# Patient Record
Sex: FEMALE | Race: WHITE | Employment: OTHER | URBAN - METROPOLITAN AREA
[De-identification: names, ages, dates, MRNs, and addresses within clinical notes are randomized per-mention and may not be internally consistent; named-entity substitution may affect disease eponyms.]

---

## 2017-01-13 PROBLEM — E66.01 MORBID OBESITY WITH BMI OF 45.0-49.9, ADULT (HCC): Status: ACTIVE | Noted: 2017-01-13

## 2017-01-13 PROCEDURE — 88305 TISSUE EXAM BY PATHOLOGIST: CPT | Performed by: OBSTETRICS & GYNECOLOGY

## 2017-02-24 ENCOUNTER — HOSPITAL ENCOUNTER (OUTPATIENT)
Dept: RADIATION ONCOLOGY | Age: 62
Discharge: HOME | End: 2017-02-24
Attending: RADIOLOGY
Payer: COMMERCIAL

## 2017-02-24 VITALS
OXYGEN SATURATION: 97 % | SYSTOLIC BLOOD PRESSURE: 135 MMHG | DIASTOLIC BLOOD PRESSURE: 80 MMHG | RESPIRATION RATE: 20 BRPM | TEMPERATURE: 100 F | HEIGHT: 64 IN | BODY MASS INDEX: 45.73 KG/M2 | WEIGHT: 267.88 LBS | HEART RATE: 94 BPM

## 2017-02-24 DIAGNOSIS — C54.8 MALIGNANT NEOPLASM OF OVERLAPPING SITES OF BODY OF UTERUS (HCC): Primary | ICD-10-CM

## 2017-02-24 PROCEDURE — 99214 OFFICE O/P EST MOD 30 MIN: CPT

## 2017-02-24 RX ORDER — PREDNISOLONE ACETATE 10 MG/ML
SUSPENSION/ DROPS OPHTHALMIC
Refills: 1 | COMMUNITY
Start: 2017-01-17 | End: 2018-10-25 | Stop reason: ALTCHOICE

## 2017-02-24 RX ORDER — CALCIUM CARBONATE 200(500)MG
3 TABLET,CHEWABLE ORAL DAILY
COMMUNITY
End: 2021-02-08

## 2017-02-24 RX ORDER — FLUTICASONE PROPIONATE 50 MCG
2 SPRAY, SUSPENSION (ML) NASAL AS NEEDED
Refills: 2 | COMMUNITY
Start: 2017-01-05

## 2017-02-24 RX ORDER — ACETAMINOPHEN 500 MG
500 TABLET ORAL EVERY 6 HOURS PRN
COMMUNITY
End: 2018-08-27

## 2017-02-24 RX ORDER — CHOLECALCIFEROL (VITAMIN D3) 1250 MCG
5000 CAPSULE ORAL DAILY
COMMUNITY

## 2017-02-24 RX ORDER — HOMATROPINE HYDROBROMIDE OPHTHALMIC 50 MG/ML
SOLUTION OPHTHALMIC
Refills: 1 | COMMUNITY
Start: 2017-01-17 | End: 2017-08-28

## 2017-02-24 NOTE — PROGRESS NOTES
Nursing Consultation Note  Patient: Meka Lorenzo  YOB: 1955  Age: 59year old  Radiation Oncologist: Dr. Irene Wagner  Referring Physician: Dr. Erinn Mcgrath Si (PCP)  Diagnosis: UTERINE CANCER  Consult Date: obstruction     Allergies:  Seasonal                     Chemotherapy: N/A    Previous Radiation: N/A     Social History   Marital Status:   Spouse Name: N/A    Years of Education: N/A  Number of Children: 4     Occupational History  RETIRED [employer] Respiratory: Negative. Cardiovascular: Negative. Gastrointestinal: Negative. Genitourinary: Positive for urgency. Musculoskeletal: Negative. Skin: Negative. Neurological: Negative. Endo/Heme/Allergies: Negative.     Psychiatric/Behavio

## 2017-02-24 NOTE — CONSULTS
[facility]    PATIENT'S NAME: Izzy Lore   RADIATION ONCOLOGIST: Johnny Alves M.D.    PATIENT ACCOUNT #: [de-identified] [facility]   MEDICAL RECORD #: [id number] YOB: 1955   CONSULTATION DATE: 02/24/2017       RICHIE by Dr. Regino Espinal postoperatively on 02/20/2017. Her staples have been removed, and he states that the incision sites are healing well. She is now referred for consideration of postoperative radiation therapy options.   She states that no chemotherapy was rec pressure 135/80, pulse 94, temperature 99.6, respiratory rate 20, weight 267, and O2 level is 97% on room air. HEENT:  Normocephalic, atraumatic. EOMI. ROSALEE. Oral cavity and oropharynx without exudates or lesions.   NECK:  Without supraclavicular or cer irradiation. After a lengthy discussion, it was elected that the patient would benefit from vaginal brachytherapy alone to minimize her side effects and maximize her cure rate.   I have recommended that she receive 30 Gy to the vaginal mucosa in 5 frac

## 2017-03-01 ENCOUNTER — HOSPITAL ENCOUNTER (OUTPATIENT)
Dept: RADIATION ONCOLOGY | Facility: HOSPITAL | Age: 62
Discharge: HOME | End: 2017-03-01
Attending: RADIOLOGY
Payer: COMMERCIAL

## 2017-03-02 ENCOUNTER — APPOINTMENT (OUTPATIENT)
Dept: RADIATION ONCOLOGY | Age: 62
End: 2017-03-02
Attending: RADIOLOGY
Payer: COMMERCIAL

## 2017-03-03 ENCOUNTER — HOSPITAL ENCOUNTER (OUTPATIENT)
Dept: RADIATION ONCOLOGY | Age: 62
Discharge: HOME | End: 2017-03-03
Attending: RADIOLOGY
Payer: COMMERCIAL

## 2017-03-03 ENCOUNTER — TELEPHONE (OUTPATIENT)
Dept: RADIATION ONCOLOGY | Age: 62
End: 2017-03-03

## 2017-03-03 ENCOUNTER — NURSE ONLY (OUTPATIENT)
Dept: HEMATOLOGY/ONCOLOGY | Age: 62
End: 2017-03-03
Attending: RADIOLOGY
Payer: COMMERCIAL

## 2017-03-03 DIAGNOSIS — Z90.710 HISTORY OF HYSTERECTOMY FOR MALIGNANCY: ICD-10-CM

## 2017-03-03 DIAGNOSIS — Z90.710 HISTORY OF HYSTERECTOMY FOR MALIGNANCY: Primary | ICD-10-CM

## 2017-03-03 DIAGNOSIS — N99.89 POSTOPERATIVE UTI (URINARY TRACT INFECTION): Primary | ICD-10-CM

## 2017-03-03 DIAGNOSIS — R30.0 DYSURIA: ICD-10-CM

## 2017-03-03 DIAGNOSIS — N39.0 POSTOPERATIVE UTI (URINARY TRACT INFECTION): Primary | ICD-10-CM

## 2017-03-03 LAB
BILIRUB UR QL STRIP.AUTO: NEGATIVE
GLUCOSE UR STRIP.AUTO-MCNC: NEGATIVE MG/DL
KETONES UR STRIP.AUTO-MCNC: NEGATIVE MG/DL
NITRITE UR QL STRIP.AUTO: NEGATIVE
PH UR STRIP.AUTO: 5 [PH] (ref 4.5–8)
PROT UR STRIP.AUTO-MCNC: NEGATIVE MG/DL
SP GR UR STRIP.AUTO: <=1.005 (ref 1–1.03)
UROBILINOGEN UR STRIP.AUTO-MCNC: 0.2 MG/DL
WBC #/AREA URNS AUTO: >50 /HPF

## 2017-03-03 PROCEDURE — 87086 URINE CULTURE/COLONY COUNT: CPT

## 2017-03-03 PROCEDURE — 81001 URINALYSIS AUTO W/SCOPE: CPT

## 2017-03-03 PROCEDURE — 77470 SPECIAL RADIATION TREATMENT: CPT | Performed by: RADIOLOGY

## 2017-03-03 RX ORDER — SULFAMETHOXAZOLE AND TRIMETHOPRIM 800; 160 MG/1; MG/1
1 TABLET ORAL 2 TIMES DAILY
Qty: 20 TABLET | Refills: 0 | Status: SHIPPED | OUTPATIENT
Start: 2017-03-03 | End: 2017-03-13

## 2017-03-03 NOTE — TELEPHONE ENCOUNTER
Pt had CT simulation for vaginal cuff HDR cylinder. C/O dysuria, occ hematuria for last few days. UTI ordered by Dr. Dada Gerard; showed +1 bacteria. Order placed for Bactrim at pt's pharmacy. Called pt with UA results, and of Bactrim order.  Also instructed

## 2017-03-15 PROCEDURE — 77300 RADIATION THERAPY DOSE PLAN: CPT | Performed by: RADIOLOGY

## 2017-03-15 PROCEDURE — 77295 3-D RADIOTHERAPY PLAN: CPT | Performed by: RADIOLOGY

## 2017-03-15 PROCEDURE — 77332 RADIATION TREATMENT AID(S): CPT | Performed by: RADIOLOGY

## 2017-03-15 PROCEDURE — 77370 RADIATION PHYSICS CONSULT: CPT | Performed by: RADIOLOGY

## 2017-03-17 ENCOUNTER — TELEPHONE (OUTPATIENT)
Dept: RADIATION ONCOLOGY | Facility: HOSPITAL | Age: 62
End: 2017-03-17

## 2017-03-17 NOTE — TELEPHONE ENCOUNTER
TC to patient to review instructions for first radiation treatment. Instructed on time 2:45 pm for next Tuesday 3/21, where to park & check in. Also instructed to bring undergarment we provided for use during treatment.  Patient voiced good understanding &

## 2017-03-21 ENCOUNTER — HOSPITAL ENCOUNTER (OUTPATIENT)
Dept: RADIATION ONCOLOGY | Facility: HOSPITAL | Age: 62
Discharge: HOME | End: 2017-03-21
Attending: RADIOLOGY
Payer: COMMERCIAL

## 2017-03-21 PROCEDURE — 77770 HDR RDNCL NTRSTL/ICAV BRCHTX: CPT | Performed by: RADIOLOGY

## 2017-03-21 PROCEDURE — 57156 INS VAG BRACHYTX DEVICE: CPT | Performed by: RADIOLOGY

## 2017-03-28 ENCOUNTER — HOSPITAL ENCOUNTER (OUTPATIENT)
Dept: RADIATION ONCOLOGY | Facility: HOSPITAL | Age: 62
Discharge: HOME | End: 2017-03-28
Attending: RADIOLOGY
Payer: COMMERCIAL

## 2017-03-28 PROCEDURE — 77770 HDR RDNCL NTRSTL/ICAV BRCHTX: CPT | Performed by: RADIOLOGY

## 2017-03-28 PROCEDURE — 57156 INS VAG BRACHYTX DEVICE: CPT | Performed by: RADIOLOGY

## 2017-03-29 ENCOUNTER — TELEPHONE (OUTPATIENT)
Dept: RADIATION ONCOLOGY | Facility: HOSPITAL | Age: 62
End: 2017-03-29

## 2017-04-01 ENCOUNTER — HOSPITAL ENCOUNTER (OUTPATIENT)
Dept: RADIATION ONCOLOGY | Facility: HOSPITAL | Age: 62
Discharge: HOME | End: 2017-04-01
Attending: RADIOLOGY
Payer: COMMERCIAL

## 2017-04-04 ENCOUNTER — APPOINTMENT (OUTPATIENT)
Dept: RADIATION ONCOLOGY | Facility: HOSPITAL | Age: 62
End: 2017-04-04
Attending: RADIOLOGY
Payer: COMMERCIAL

## 2017-04-05 ENCOUNTER — HOSPITAL ENCOUNTER (OUTPATIENT)
Dept: RADIATION ONCOLOGY | Facility: HOSPITAL | Age: 62
Discharge: HOME | End: 2017-04-05
Attending: RADIOLOGY
Payer: COMMERCIAL

## 2017-04-05 PROCEDURE — 77770 HDR RDNCL NTRSTL/ICAV BRCHTX: CPT | Performed by: RADIOLOGY

## 2017-04-05 PROCEDURE — 57156 INS VAG BRACHYTX DEVICE: CPT | Performed by: RADIOLOGY

## 2017-04-07 NOTE — PATIENT INSTRUCTIONS
POST-RADIATION INSTRUCTIONS:   - FOLLOW-UP WITH DR. Edgar Moore AND DR. CUEVAS ON MAY 10 AT 9:30 AM AT [address] [address]  - SIDE EFFECTS OF RADIATION WILL GRADUALLY SUBSIDE, IT MAY TAKE UP TO 2-3 WEEKS POST-RADIATION FOR YOU TO NOTICE C

## 2017-04-11 ENCOUNTER — APPOINTMENT (OUTPATIENT)
Dept: RADIATION ONCOLOGY | Facility: HOSPITAL | Age: 62
End: 2017-04-11
Attending: RADIOLOGY
Payer: COMMERCIAL

## 2017-04-12 ENCOUNTER — HOSPITAL ENCOUNTER (OUTPATIENT)
Dept: RADIATION ONCOLOGY | Facility: HOSPITAL | Age: 62
Discharge: HOME | End: 2017-04-12
Attending: RADIOLOGY
Payer: COMMERCIAL

## 2017-04-12 VITALS
SYSTOLIC BLOOD PRESSURE: 108 MMHG | TEMPERATURE: 99 F | RESPIRATION RATE: 20 BRPM | DIASTOLIC BLOOD PRESSURE: 84 MMHG | HEART RATE: 71 BPM | OXYGEN SATURATION: 95 %

## 2017-04-12 DIAGNOSIS — C54.8 MALIGNANT NEOPLASM OF OVERLAPPING SITES OF BODY OF UTERUS (HCC): Primary | ICD-10-CM

## 2017-04-12 PROCEDURE — 57156 INS VAG BRACHYTX DEVICE: CPT | Performed by: RADIOLOGY

## 2017-04-12 PROCEDURE — 77770 HDR RDNCL NTRSTL/ICAV BRCHTX: CPT | Performed by: RADIOLOGY

## 2017-04-18 ENCOUNTER — HOSPITAL ENCOUNTER (OUTPATIENT)
Dept: RADIATION ONCOLOGY | Facility: HOSPITAL | Age: 62
Discharge: HOME | End: 2017-04-18
Attending: RADIOLOGY
Payer: COMMERCIAL

## 2017-04-18 PROCEDURE — 57156 INS VAG BRACHYTX DEVICE: CPT | Performed by: RADIOLOGY

## 2017-04-18 PROCEDURE — 77770 HDR RDNCL NTRSTL/ICAV BRCHTX: CPT | Performed by: RADIOLOGY

## 2017-04-20 ENCOUNTER — MEDICAL CORRESPONDENCE (OUTPATIENT)
Dept: RADIATION ONCOLOGY | Age: 62
End: 2017-04-20

## 2017-04-20 ENCOUNTER — TELEPHONE (OUTPATIENT)
Dept: RADIATION ONCOLOGY | Facility: HOSPITAL | Age: 62
End: 2017-04-20

## 2017-04-20 NOTE — PROGRESS NOTES
RADIATION ONCOLOGY TREATMENT SUMMARY         [id number] [address] Location: [facility]   Date of Birth   2/26/1955 Radiation Oncologist     Jaleesa Rendon MD, [address] [facility] TREATMENT SUMMARY     REFERRING The patient was referred for postoperative radiation therapy options. The following summarizes the radiation therapy (brachytherapy alone) that was delivered.     TECHNICAL SUMMARY:   [facility]    Patient Name: Teresa Robins  Patient ID: [id number]

## 2017-05-10 ENCOUNTER — HOSPITAL ENCOUNTER (OUTPATIENT)
Dept: RADIATION ONCOLOGY | Facility: HOSPITAL | Age: 62
Discharge: HOME | End: 2017-05-10
Attending: RADIOLOGY
Payer: COMMERCIAL

## 2017-05-10 VITALS
RESPIRATION RATE: 20 BRPM | SYSTOLIC BLOOD PRESSURE: 138 MMHG | WEIGHT: 269.38 LBS | DIASTOLIC BLOOD PRESSURE: 43 MMHG | HEART RATE: 76 BPM | TEMPERATURE: 98 F | BODY MASS INDEX: 46 KG/M2 | OXYGEN SATURATION: 98 %

## 2017-05-10 DIAGNOSIS — C54.8 MALIGNANT NEOPLASM OF OVERLAPPING SITES OF BODY OF UTERUS (HCC): Primary | ICD-10-CM

## 2017-05-10 PROCEDURE — 99214 OFFICE O/P EST MOD 30 MIN: CPT

## 2017-05-10 RX ORDER — MELATONIN
1000 DAILY
COMMUNITY

## 2017-05-10 NOTE — PATIENT INSTRUCTIONS
- CALL [phone] FOR A FOLLOW-UP WITH DR. Johnnie Levine IN 6 MONTHS OR AS NEEDED    - FOLLOW-UP WITH DR. CUEVAS AS RECOMMENDED  - CALL IF YOU HAVE ANY QUESTIONS/CONCERNS REGARDING RADIATION THERAPY

## 2017-05-10 NOTE — PROGRESS NOTES
Nursing Follow-Up Note    Patient: Isela Morales  YOB: 1955  Age: 57year old  Radiation Oncologist: Dr. Jacqueline Franklin  Referring Physician: Hiral Myers  Chief Complaint: Patient presents with:  Uterine Cancer    Date: 5/10/201

## 2017-05-11 NOTE — PROGRESS NOTES
[facility]    PATIENT'S NAME: Escobarlan Naif   RADIATION ONCOLOGIST: Talita Cutler M.D.    PATIENT ACCOUNT #: [de-identified] Nurys Corbett   [facility]   MEDICAL RECORD #: [id number] YOB: 1955   FOLLOW-UP DATE: 05/10/2017       RADIATI instructed. Thank you for allowing us to participate in this very nice lady's care.     Dictated By Abby Norton M.D.  d: 05/10/2017 14:14:53  t: 05/11/2017 00:58:19  [facility] [id number]  VL/

## 2017-08-28 ENCOUNTER — LAB ENCOUNTER (OUTPATIENT)
Dept: LAB | Age: 62
End: 2017-08-28
Attending: FAMILY MEDICINE
Payer: COMMERCIAL

## 2017-08-28 ENCOUNTER — OFFICE VISIT (OUTPATIENT)
Dept: FAMILY MEDICINE CLINIC | Facility: CLINIC | Age: 62
End: 2017-08-28

## 2017-08-28 VITALS
BODY MASS INDEX: 46.78 KG/M2 | OXYGEN SATURATION: 99 % | WEIGHT: 274 LBS | HEIGHT: 64 IN | SYSTOLIC BLOOD PRESSURE: 124 MMHG | RESPIRATION RATE: 12 BRPM | TEMPERATURE: 99 F | DIASTOLIC BLOOD PRESSURE: 68 MMHG | HEART RATE: 68 BPM

## 2017-08-28 DIAGNOSIS — E78.5 HYPERLIPIDEMIA, UNSPECIFIED HYPERLIPIDEMIA TYPE: ICD-10-CM

## 2017-08-28 DIAGNOSIS — I10 ESSENTIAL HYPERTENSION: ICD-10-CM

## 2017-08-28 DIAGNOSIS — Z23 NEED FOR VACCINATION FOR PNEUMOCOCCUS: ICD-10-CM

## 2017-08-28 DIAGNOSIS — Z12.39 BREAST CANCER SCREENING: ICD-10-CM

## 2017-08-28 DIAGNOSIS — Z01.419 WELL WOMAN EXAM: Primary | ICD-10-CM

## 2017-08-28 DIAGNOSIS — F32.A DEPRESSION, UNSPECIFIED DEPRESSION TYPE: ICD-10-CM

## 2017-08-28 DIAGNOSIS — E03.9 HYPOTHYROIDISM, UNSPECIFIED TYPE: ICD-10-CM

## 2017-08-28 DIAGNOSIS — C55 MALIGNANT NEOPLASM OF UTERUS, UNSPECIFIED SITE (HCC): ICD-10-CM

## 2017-08-28 DIAGNOSIS — Z01.419 WELL WOMAN EXAM: ICD-10-CM

## 2017-08-28 LAB
ALBUMIN SERPL-MCNC: 3.2 G/DL (ref 3.5–4.8)
ALP LIVER SERPL-CCNC: 74 U/L (ref 50–130)
ALT SERPL-CCNC: 27 U/L (ref 14–54)
AST SERPL-CCNC: 21 U/L (ref 15–41)
BASOPHILS # BLD AUTO: 0.07 X10(3) UL (ref 0–0.1)
BASOPHILS NFR BLD AUTO: 1.3 %
BILIRUB SERPL-MCNC: 0.2 MG/DL (ref 0.1–2)
BUN BLD-MCNC: 13 MG/DL (ref 8–20)
CALCIUM BLD-MCNC: 8.7 MG/DL (ref 8.3–10.3)
CHLORIDE: 106 MMOL/L (ref 101–111)
CHOLEST SMN-MCNC: 139 MG/DL (ref ?–200)
CO2: 26 MMOL/L (ref 22–32)
CREAT BLD-MCNC: 0.75 MG/DL (ref 0.55–1.02)
EOSINOPHIL # BLD AUTO: 0.26 X10(3) UL (ref 0–0.3)
EOSINOPHIL NFR BLD AUTO: 4.9 %
ERYTHROCYTE [DISTWIDTH] IN BLOOD BY AUTOMATED COUNT: 15.6 % (ref 11.5–16)
FREE T4: 1.2 NG/DL (ref 0.9–1.8)
GLUCOSE BLD-MCNC: 87 MG/DL (ref 70–99)
HCT VFR BLD AUTO: 41.2 % (ref 34–50)
HDLC SERPL-MCNC: 52 MG/DL (ref 45–?)
HDLC SERPL: 2.67 {RATIO} (ref ?–4.44)
HGB BLD-MCNC: 12.6 G/DL (ref 12–16)
IMMATURE GRANULOCYTE COUNT: 0.02 X10(3) UL (ref 0–1)
IMMATURE GRANULOCYTE RATIO %: 0.4 %
LDLC SERPL CALC-MCNC: 56 MG/DL (ref ?–130)
LDLC SERPL-MCNC: 31 MG/DL (ref 5–40)
LYMPHOCYTES # BLD AUTO: 1.09 X10(3) UL (ref 0.9–4)
LYMPHOCYTES NFR BLD AUTO: 20.6 %
M PROTEIN MFR SERPL ELPH: 7.7 G/DL (ref 6.1–8.3)
MCH RBC QN AUTO: 26.6 PG (ref 27–33.2)
MCHC RBC AUTO-ENTMCNC: 30.6 G/DL (ref 31–37)
MCV RBC AUTO: 87.1 FL (ref 81–100)
MONOCYTES # BLD AUTO: 0.4 X10(3) UL (ref 0.1–0.6)
MONOCYTES NFR BLD AUTO: 7.5 %
NEUTROPHIL ABS PRELIM: 3.46 X10 (3) UL (ref 1.3–6.7)
NEUTROPHILS # BLD AUTO: 3.46 X10(3) UL (ref 1.3–6.7)
NEUTROPHILS NFR BLD AUTO: 65.3 %
NONHDLC SERPL-MCNC: 87 MG/DL (ref ?–130)
PLATELET # BLD AUTO: 101 10(3)UL (ref 150–450)
POTASSIUM SERPL-SCNC: 3.6 MMOL/L (ref 3.6–5.1)
RBC # BLD AUTO: 4.73 X10(6)UL (ref 3.8–5.1)
RED CELL DISTRIBUTION WIDTH-SD: 49.8 FL (ref 35.1–46.3)
SODIUM SERPL-SCNC: 141 MMOL/L (ref 136–144)
TRIGLYCERIDES: 157 MG/DL (ref ?–150)
TSI SER-ACNC: 1.55 MIU/ML (ref 0.35–5.5)
WBC # BLD AUTO: 5.3 X10(3) UL (ref 4–13)

## 2017-08-28 PROCEDURE — 36415 COLL VENOUS BLD VENIPUNCTURE: CPT | Performed by: FAMILY MEDICINE

## 2017-08-28 PROCEDURE — 99386 PREV VISIT NEW AGE 40-64: CPT | Performed by: FAMILY MEDICINE

## 2017-08-28 PROCEDURE — 90670 PCV13 VACCINE IM: CPT | Performed by: FAMILY MEDICINE

## 2017-08-28 PROCEDURE — 80050 GENERAL HEALTH PANEL: CPT | Performed by: FAMILY MEDICINE

## 2017-08-28 PROCEDURE — 84439 ASSAY OF FREE THYROXINE: CPT | Performed by: FAMILY MEDICINE

## 2017-08-28 PROCEDURE — 90471 IMMUNIZATION ADMIN: CPT | Performed by: FAMILY MEDICINE

## 2017-08-28 PROCEDURE — 80061 LIPID PANEL: CPT | Performed by: FAMILY MEDICINE

## 2017-08-28 RX ORDER — LEVOTHYROXINE SODIUM 0.15 MG/1
150 TABLET ORAL
Qty: 90 TABLET | Refills: 1 | Status: SHIPPED | OUTPATIENT
Start: 2017-08-28 | End: 2017-09-08

## 2017-08-28 RX ORDER — LISINOPRIL AND HYDROCHLOROTHIAZIDE 12.5; 1 MG/1; MG/1
1 TABLET ORAL DAILY
Qty: 90 TABLET | Refills: 1 | Status: SHIPPED | OUTPATIENT
Start: 2017-08-28 | End: 2017-09-08

## 2017-08-28 RX ORDER — VENLAFAXINE HYDROCHLORIDE 75 MG/1
75 CAPSULE, EXTENDED RELEASE ORAL DAILY
Qty: 90 CAPSULE | Refills: 1 | Status: SHIPPED | OUTPATIENT
Start: 2017-08-28 | End: 2017-09-08

## 2017-08-28 RX ORDER — SIMVASTATIN 20 MG
20 TABLET ORAL NIGHTLY
Qty: 90 TABLET | Refills: 1 | Status: SHIPPED | OUTPATIENT
Start: 2017-08-28 | End: 2017-09-08

## 2017-08-28 NOTE — PROGRESS NOTES
HPI:   Kell Soto is a 57year old female that presents for annual exam.    She has a history of stage II uterine cancer status post total hysterectomy in February 2017. She follows up with Dr. Lorenzo Garcia (gyn onc) regularly every 3 months.   She comple gallops. LUNGS: Clear to auscultation bilterally, no rales/rhonchi/wheezing. CHEST: No tenderness. ABDOMEN:  Soft, nondistended, nontender, bowel sounds normal in all 4 quadrants, no masses, no hepatosplenomegaly.   EXTREMITIES:  No edema, no cyanosis, n

## 2017-08-28 NOTE — PATIENT INSTRUCTIONS
Continue to work on weight loss  Diet less than 8144-9353 calories per day  Exercise 5 times per week for 30 min    Schedule Mammogram    Follow up in fall for flu shot  Follow up in 6 months for medication management

## 2017-09-08 DIAGNOSIS — I10 ESSENTIAL HYPERTENSION: ICD-10-CM

## 2017-09-08 DIAGNOSIS — F32.A DEPRESSION, UNSPECIFIED DEPRESSION TYPE: ICD-10-CM

## 2017-09-08 DIAGNOSIS — E03.9 HYPOTHYROIDISM, UNSPECIFIED TYPE: ICD-10-CM

## 2017-09-08 DIAGNOSIS — E78.5 HYPERLIPIDEMIA, UNSPECIFIED HYPERLIPIDEMIA TYPE: ICD-10-CM

## 2017-09-08 RX ORDER — VENLAFAXINE HYDROCHLORIDE 75 MG/1
75 CAPSULE, EXTENDED RELEASE ORAL DAILY
Qty: 90 CAPSULE | Refills: 1 | Status: SHIPPED | OUTPATIENT
Start: 2017-09-08 | End: 2018-02-27

## 2017-09-08 RX ORDER — LISINOPRIL AND HYDROCHLOROTHIAZIDE 12.5; 1 MG/1; MG/1
1 TABLET ORAL DAILY
Qty: 90 TABLET | Refills: 1 | Status: SHIPPED | OUTPATIENT
Start: 2017-09-08 | End: 2018-02-27

## 2017-09-08 RX ORDER — SIMVASTATIN 20 MG
20 TABLET ORAL NIGHTLY
Qty: 90 TABLET | Refills: 1 | Status: SHIPPED | OUTPATIENT
Start: 2017-09-08 | End: 2018-02-27

## 2017-09-08 RX ORDER — LEVOTHYROXINE SODIUM 0.15 MG/1
150 TABLET ORAL
Qty: 90 TABLET | Refills: 1 | Status: SHIPPED | OUTPATIENT
Start: 2017-09-08 | End: 2018-02-27

## 2017-09-08 NOTE — TELEPHONE ENCOUNTER
From: Tracy Ren  Sent: 9/8/2017 11:00 AM CDT  Subject: Medication Renewal Request    Keri Canales.  Kaden Vale would like a refill of the following medications:  Levothyroxine Sodium 150 MCG Oral Tab [Rubi Brown, ]  Lisinopril-Hydrochlorothiazide 1

## 2017-09-22 ENCOUNTER — CHARTING TRANS (OUTPATIENT)
Dept: OTHER | Age: 62
End: 2017-09-22

## 2017-11-01 ENCOUNTER — HOSPITAL ENCOUNTER (OUTPATIENT)
Dept: MAMMOGRAPHY | Age: 62
Discharge: HOME | End: 2017-11-01
Attending: FAMILY MEDICINE
Payer: COMMERCIAL

## 2017-11-01 DIAGNOSIS — Z12.39 BREAST CANCER SCREENING: ICD-10-CM

## 2017-11-01 PROCEDURE — 77067 SCR MAMMO BI INCL CAD: CPT | Performed by: FAMILY MEDICINE

## 2017-11-16 ENCOUNTER — HOSPITAL ENCOUNTER (OUTPATIENT)
Dept: RADIATION ONCOLOGY | Age: 62
Discharge: HOME | End: 2017-11-16
Attending: RADIOLOGY
Payer: COMMERCIAL

## 2017-11-16 VITALS
SYSTOLIC BLOOD PRESSURE: 123 MMHG | BODY MASS INDEX: 47 KG/M2 | HEART RATE: 77 BPM | DIASTOLIC BLOOD PRESSURE: 85 MMHG | WEIGHT: 276 LBS | TEMPERATURE: 98 F | RESPIRATION RATE: 18 BRPM | OXYGEN SATURATION: 96 %

## 2017-11-16 DIAGNOSIS — C54.8 MALIGNANT NEOPLASM OF OVERLAPPING SITES OF BODY OF UTERUS (HCC): Primary | ICD-10-CM

## 2017-11-16 PROCEDURE — 99214 OFFICE O/P EST MOD 30 MIN: CPT

## 2017-11-16 NOTE — PATIENT INSTRUCTIONS
- FOLLOW-UP WITH DR. Hanson Born ONLY AS NEEDED  - FOLLOW-UP WITH DR. CUEVAS AS SCHEDULED (FEBRUARY 2018)  - CALL IF YOU HAVE ANY QUESTIONS/CONCERNS REGARDING RADIATION THERAPY

## 2017-11-16 NOTE — PROGRESS NOTES
Nursing Follow-Up Note    Patient: John Louise  YOB: 1955  Age: 57year old  Radiation Oncologist: Dr. Derian Dotson  Referring Physician: Dr. Eleonora Chang  Chief Complaint: Patient presents with:  Uterine Cancer    Date: 11/16/2017

## 2018-02-27 ENCOUNTER — OFFICE VISIT (OUTPATIENT)
Dept: FAMILY MEDICINE CLINIC | Facility: CLINIC | Age: 63
End: 2018-02-27

## 2018-02-27 ENCOUNTER — LAB ENCOUNTER (OUTPATIENT)
Dept: LAB | Age: 63
End: 2018-02-27
Attending: FAMILY MEDICINE
Payer: COMMERCIAL

## 2018-02-27 VITALS
TEMPERATURE: 98 F | WEIGHT: 281.38 LBS | HEART RATE: 72 BPM | DIASTOLIC BLOOD PRESSURE: 78 MMHG | SYSTOLIC BLOOD PRESSURE: 118 MMHG | BODY MASS INDEX: 48.04 KG/M2 | RESPIRATION RATE: 16 BRPM | HEIGHT: 64.25 IN

## 2018-02-27 DIAGNOSIS — I10 ESSENTIAL HYPERTENSION: ICD-10-CM

## 2018-02-27 DIAGNOSIS — F32.A DEPRESSION, UNSPECIFIED DEPRESSION TYPE: ICD-10-CM

## 2018-02-27 DIAGNOSIS — E03.9 HYPOTHYROIDISM, UNSPECIFIED TYPE: ICD-10-CM

## 2018-02-27 DIAGNOSIS — D69.6 THROMBOCYTOPENIA (HCC): ICD-10-CM

## 2018-02-27 DIAGNOSIS — E78.5 HYPERLIPIDEMIA, UNSPECIFIED HYPERLIPIDEMIA TYPE: ICD-10-CM

## 2018-02-27 DIAGNOSIS — R30.0 DYSURIA: Primary | ICD-10-CM

## 2018-02-27 PROBLEM — M85.80 OSTEOPENIA: Status: ACTIVE | Noted: 2018-02-27

## 2018-02-27 PROBLEM — J30.2 SEASONAL ALLERGIES: Status: ACTIVE | Noted: 2018-02-27

## 2018-02-27 LAB
ALBUMIN SERPL-MCNC: 3.2 G/DL (ref 3.5–4.8)
ALP LIVER SERPL-CCNC: 71 U/L (ref 50–130)
ALT SERPL-CCNC: 24 U/L (ref 14–54)
AST SERPL-CCNC: 19 U/L (ref 15–41)
BASOPHILS # BLD AUTO: 0.06 X10(3) UL (ref 0–0.1)
BASOPHILS NFR BLD AUTO: 0.9 %
BILIRUB SERPL-MCNC: 0.4 MG/DL (ref 0.1–2)
BLOOD: NEGATIVE
BUN BLD-MCNC: 20 MG/DL (ref 8–20)
CALCIUM BLD-MCNC: 9.1 MG/DL (ref 8.3–10.3)
CHLORIDE: 104 MMOL/L (ref 101–111)
CHOLEST SMN-MCNC: 132 MG/DL (ref ?–200)
CITRATED PLT COUNT: 262.9 10(3)UL
CO2: 28 MMOL/L (ref 22–32)
CREAT BLD-MCNC: 0.83 MG/DL (ref 0.55–1.02)
EOSINOPHIL # BLD AUTO: 0.2 X10(3) UL (ref 0–0.3)
EOSINOPHIL NFR BLD AUTO: 2.9 %
ERYTHROCYTE [DISTWIDTH] IN BLOOD BY AUTOMATED COUNT: 14.9 % (ref 11.5–16)
GLUCOSE BLD-MCNC: 86 MG/DL (ref 70–99)
HCT VFR BLD AUTO: 40.6 % (ref 34–50)
HDLC SERPL-MCNC: 49 MG/DL (ref 45–?)
HDLC SERPL: 2.69 {RATIO} (ref ?–4.44)
HGB BLD-MCNC: 12.8 G/DL (ref 12–16)
IMMATURE GRANULOCYTE COUNT: 0.01 X10(3) UL (ref 0–1)
IMMATURE GRANULOCYTE RATIO %: 0.1 %
LDLC SERPL CALC-MCNC: 48 MG/DL (ref ?–130)
LYMPHOCYTES # BLD AUTO: 1.04 X10(3) UL (ref 0.9–4)
LYMPHOCYTES NFR BLD AUTO: 15.1 %
M PROTEIN MFR SERPL ELPH: 7.6 G/DL (ref 6.1–8.3)
MCH RBC QN AUTO: 26.7 PG (ref 27–33.2)
MCHC RBC AUTO-ENTMCNC: 31.5 G/DL (ref 31–37)
MCV RBC AUTO: 84.8 FL (ref 81–100)
MONOCYTES # BLD AUTO: 0.49 X10(3) UL (ref 0.1–1)
MONOCYTES NFR BLD AUTO: 7.1 %
NEUTROPHIL ABS PRELIM: 5.11 X10 (3) UL (ref 1.3–6.7)
NEUTROPHILS # BLD AUTO: 5.11 X10(3) UL (ref 1.3–6.7)
NEUTROPHILS NFR BLD AUTO: 73.9 %
NONHDLC SERPL-MCNC: 83 MG/DL (ref ?–130)
PLATELET # BLD AUTO: 100 10(3)UL (ref 150–450)
POTASSIUM SERPL-SCNC: 3.9 MMOL/L (ref 3.6–5.1)
RBC # BLD AUTO: 4.79 X10(6)UL (ref 3.8–5.1)
RED CELL DISTRIBUTION WIDTH-SD: 46.5 FL (ref 35.1–46.3)
SODIUM SERPL-SCNC: 139 MMOL/L (ref 136–144)
TRIGL SERPL-MCNC: 174 MG/DL (ref ?–150)
TSI SER-ACNC: 1.18 MIU/ML (ref 0.35–5.5)
VLDLC SERPL CALC-MCNC: 35 MG/DL (ref 5–40)
WBC # BLD AUTO: 6.9 X10(3) UL (ref 4–13)

## 2018-02-27 PROCEDURE — 87088 URINE BACTERIA CULTURE: CPT | Performed by: FAMILY MEDICINE

## 2018-02-27 PROCEDURE — 87086 URINE CULTURE/COLONY COUNT: CPT | Performed by: FAMILY MEDICINE

## 2018-02-27 PROCEDURE — 36415 COLL VENOUS BLD VENIPUNCTURE: CPT | Performed by: FAMILY MEDICINE

## 2018-02-27 PROCEDURE — 99214 OFFICE O/P EST MOD 30 MIN: CPT | Performed by: FAMILY MEDICINE

## 2018-02-27 PROCEDURE — 87186 SC STD MICRODIL/AGAR DIL: CPT | Performed by: FAMILY MEDICINE

## 2018-02-27 PROCEDURE — 81001 URINALYSIS AUTO W/SCOPE: CPT | Performed by: FAMILY MEDICINE

## 2018-02-27 PROCEDURE — 80050 GENERAL HEALTH PANEL: CPT | Performed by: FAMILY MEDICINE

## 2018-02-27 RX ORDER — SIMVASTATIN 20 MG
20 TABLET ORAL NIGHTLY
Qty: 90 TABLET | Refills: 1 | Status: SHIPPED
Start: 2018-02-27 | End: 2018-08-27

## 2018-02-27 RX ORDER — CEPHALEXIN 500 MG/1
500 CAPSULE ORAL 2 TIMES DAILY
Qty: 14 CAPSULE | Refills: 0 | Status: SHIPPED | OUTPATIENT
Start: 2018-02-27 | End: 2018-03-06

## 2018-02-27 RX ORDER — LISINOPRIL AND HYDROCHLOROTHIAZIDE 12.5; 1 MG/1; MG/1
1 TABLET ORAL DAILY
Qty: 90 TABLET | Refills: 1 | Status: SHIPPED
Start: 2018-02-27 | End: 2018-08-27

## 2018-02-27 RX ORDER — LISINOPRIL AND HYDROCHLOROTHIAZIDE 12.5; 1 MG/1; MG/1
1 TABLET ORAL DAILY
Qty: 90 TABLET | Refills: 1 | Status: SHIPPED | OUTPATIENT
Start: 2018-02-27 | End: 2018-02-27

## 2018-02-27 RX ORDER — SIMVASTATIN 20 MG
20 TABLET ORAL NIGHTLY
Qty: 90 TABLET | Refills: 1 | Status: SHIPPED | OUTPATIENT
Start: 2018-02-27 | End: 2018-02-27

## 2018-02-27 RX ORDER — LEVOTHYROXINE SODIUM 0.15 MG/1
150 TABLET ORAL
Qty: 90 TABLET | Refills: 1 | Status: SHIPPED
Start: 2018-02-27 | End: 2018-08-27

## 2018-02-27 RX ORDER — VENLAFAXINE HYDROCHLORIDE 75 MG/1
75 CAPSULE, EXTENDED RELEASE ORAL DAILY
Qty: 90 CAPSULE | Refills: 1 | Status: SHIPPED
Start: 2018-02-27 | End: 2018-08-27

## 2018-02-27 RX ORDER — LEVOTHYROXINE SODIUM 0.15 MG/1
150 TABLET ORAL
Qty: 90 TABLET | Refills: 1 | Status: SHIPPED | OUTPATIENT
Start: 2018-02-27 | End: 2018-02-27

## 2018-02-27 RX ORDER — VENLAFAXINE HYDROCHLORIDE 75 MG/1
75 CAPSULE, EXTENDED RELEASE ORAL DAILY
Qty: 90 CAPSULE | Refills: 1 | Status: SHIPPED | OUTPATIENT
Start: 2018-02-27 | End: 2018-02-27

## 2018-02-27 RX ORDER — HOMATROPINE HYDROBROMIDE OPHTHALMIC 50 MG/ML
SOLUTION OPHTHALMIC
Refills: 0 | COMMUNITY
Start: 2017-12-23 | End: 2018-08-27

## 2018-02-27 NOTE — PROGRESS NOTES
Received records from Dr. Ari Cortés, [facility], dated 4/19/17, 10/28/16, 4/23/17.    - Hep C results  - Zoster vaccine  - Dexa Scan  - Mammogram  - MRI    All abstracted. MD signed   Sent to scan.

## 2018-02-27 NOTE — PATIENT INSTRUCTIONS
Urinary Pain  Keflex 500 mg twice a day for one week  We will call you with urine culture results      Dysuria with Uncertain Cause (Adult)    The urethra is the tube that allows urine to pass out of the body.  In a woman, the urethra is the opening above t partner has no symptoms. · Contact your healthcare provider or go to an urgent care clinic or the public health department to be looked at and treated.   · Don't have sex until both you and your partner(s) have finished all antibiotics and your healthcare

## 2018-02-27 NOTE — TELEPHONE ENCOUNTER
From: Juliann Ramsay  Sent: 2/27/2018 12:57 PM CST  Subject: Medication Renewal Request    [facility]  [facility] would like a refill of the following medications:     Levothyroxine Sodium 150 MCG Oral Tab Umer Bland, DO]   Patient Comment: Should be

## 2018-02-27 NOTE — PROGRESS NOTES
HPI:   Sam Monteiro is a 70year old female that presents for dysuria and medication management for chronic conditions. She notes dysuria, frequency and urgency for past 3-4 days, and today hematuria.   She has been taking AZO, increasing water [place] INTO OS QID, Disp: , Rfl: 1  •  Fluticasone Propionate 50 MCG/ACT Nasal Suspension, 2 sprays by Nasal route daily.   , Disp: , Rfl: 2  •  Cholecalciferol (VITAMIN D3) 84473 units Oral Cap, Take by mouth., Disp: , Rfl:   •  aspirin 81 MG Oral Tab, Take 81 mg pelvic exam  - continue increased hydration   - URINALYSIS, ROUTINE  - URINE CULTURE, ROUTINE  - cephALEXin 500 MG Oral Cap; Take 1 capsule (500 mg total) by mouth 2 (two) times daily. Dispense: 14 capsule; Refill: 0    2.  Hypothyroidism, unspecified type

## 2018-02-27 NOTE — TELEPHONE ENCOUNTER
Requesting Levothyroxine, Lisinopril-HCTZ, Simvastatin and Venlafaxine   Last Relevant Labs: pp, except Venlafaxine - n/a       Future Appointments  Date Time Provider Ethan Slaughter   8/27/2018 9:30 AM Malcolm Brown, DO [facility] [address] [facility] [address]       All

## 2018-02-28 LAB
BILIRUB UR QL STRIP.AUTO: NEGATIVE
COLOR UR AUTO: YELLOW
GLUCOSE UR STRIP.AUTO-MCNC: NEGATIVE MG/DL
HYALINE CASTS #/AREA URNS AUTO: PRESENT /LPF
KETONES UR STRIP.AUTO-MCNC: NEGATIVE MG/DL
NITRITE UR QL STRIP.AUTO: NEGATIVE
PH UR STRIP.AUTO: 5 [PH] (ref 4.5–8)
PROT UR STRIP.AUTO-MCNC: NEGATIVE MG/DL
SP GR UR STRIP.AUTO: 1.01 (ref 1–1.03)
UROBILINOGEN UR STRIP.AUTO-MCNC: <2 MG/DL

## 2018-08-06 ENCOUNTER — PATIENT MESSAGE (OUTPATIENT)
Dept: FAMILY MEDICINE CLINIC | Facility: CLINIC | Age: 63
End: 2018-08-06

## 2018-08-06 DIAGNOSIS — I10 ESSENTIAL HYPERTENSION: ICD-10-CM

## 2018-08-06 DIAGNOSIS — E78.5 HYPERLIPIDEMIA, UNSPECIFIED HYPERLIPIDEMIA TYPE: ICD-10-CM

## 2018-08-06 DIAGNOSIS — E03.9 HYPOTHYROIDISM, UNSPECIFIED TYPE: Primary | ICD-10-CM

## 2018-08-06 NOTE — TELEPHONE ENCOUNTER
From: Sam Monteiro  To: Wayne Rincon DO  Sent: 8/6/2018 11:34 AM CDT  Subject: Other    I have an appointment scheduled for the end of the month. Do you want me to have lab work done before the appointment? If so I need an order to get it done.  Prince Saleh

## 2018-08-06 NOTE — TELEPHONE ENCOUNTER
Requesting lab orders   LOV: 2/27/18  RTC: 6 mos  Last Labs: 2/27/18- TSH, LIPID, CMP, CBC, UA  Lab orders placed, patient informed via [employer]UonGo   Future Appointments  Date Time Provider Ethan Slaughter   8/27/2018 9:00 AM Janay Brown, DO [facility]

## 2018-08-13 ENCOUNTER — APPOINTMENT (OUTPATIENT)
Dept: LAB | Age: 63
End: 2018-08-13
Attending: FAMILY MEDICINE
Payer: MEDICARE

## 2018-08-13 DIAGNOSIS — I10 ESSENTIAL HYPERTENSION: ICD-10-CM

## 2018-08-13 DIAGNOSIS — E03.9 HYPOTHYROIDISM, UNSPECIFIED TYPE: ICD-10-CM

## 2018-08-13 DIAGNOSIS — E78.5 HYPERLIPIDEMIA, UNSPECIFIED HYPERLIPIDEMIA TYPE: ICD-10-CM

## 2018-08-13 PROCEDURE — 84439 ASSAY OF FREE THYROXINE: CPT

## 2018-08-13 PROCEDURE — 84443 ASSAY THYROID STIM HORMONE: CPT

## 2018-08-13 PROCEDURE — 80061 LIPID PANEL: CPT

## 2018-08-13 PROCEDURE — 80053 COMPREHEN METABOLIC PANEL: CPT

## 2018-08-13 PROCEDURE — 36415 COLL VENOUS BLD VENIPUNCTURE: CPT

## 2018-08-27 ENCOUNTER — OFFICE VISIT (OUTPATIENT)
Dept: FAMILY MEDICINE CLINIC | Facility: CLINIC | Age: 63
End: 2018-08-27
Payer: MEDICARE

## 2018-08-27 VITALS
HEART RATE: 79 BPM | TEMPERATURE: 97 F | BODY MASS INDEX: 48.55 KG/M2 | DIASTOLIC BLOOD PRESSURE: 78 MMHG | RESPIRATION RATE: 16 BRPM | HEIGHT: 64.25 IN | SYSTOLIC BLOOD PRESSURE: 140 MMHG | WEIGHT: 284.38 LBS | OXYGEN SATURATION: 98 %

## 2018-08-27 DIAGNOSIS — E03.9 HYPOTHYROIDISM, UNSPECIFIED TYPE: ICD-10-CM

## 2018-08-27 DIAGNOSIS — Z13.6 SCREENING FOR CARDIOVASCULAR CONDITION: ICD-10-CM

## 2018-08-27 DIAGNOSIS — L98.9 SKIN LESION: ICD-10-CM

## 2018-08-27 DIAGNOSIS — L60.9 NAIL ABNORMALITY: ICD-10-CM

## 2018-08-27 DIAGNOSIS — R73.01 IMPAIRED FASTING GLUCOSE: ICD-10-CM

## 2018-08-27 DIAGNOSIS — F32.A DEPRESSION, UNSPECIFIED DEPRESSION TYPE: ICD-10-CM

## 2018-08-27 DIAGNOSIS — I10 ESSENTIAL HYPERTENSION: ICD-10-CM

## 2018-08-27 DIAGNOSIS — Z13.31 NEGATIVE DEPRESSION SCREENING: ICD-10-CM

## 2018-08-27 DIAGNOSIS — Z00.00 ENCOUNTER FOR ANNUAL HEALTH EXAMINATION: Primary | ICD-10-CM

## 2018-08-27 DIAGNOSIS — E78.5 HYPERLIPIDEMIA, UNSPECIFIED HYPERLIPIDEMIA TYPE: ICD-10-CM

## 2018-08-27 PROBLEM — H26.9 CATARACT: Status: ACTIVE | Noted: 2018-01-01

## 2018-08-27 PROCEDURE — G0438 PPPS, INITIAL VISIT: HCPCS | Performed by: FAMILY MEDICINE

## 2018-08-27 PROCEDURE — G0403 EKG FOR INITIAL PREVENT EXAM: HCPCS | Performed by: FAMILY MEDICINE

## 2018-08-27 RX ORDER — LEVOTHYROXINE SODIUM 0.15 MG/1
150 TABLET ORAL
Qty: 90 TABLET | Refills: 1 | Status: SHIPPED | OUTPATIENT
Start: 2018-08-27 | End: 2019-02-23

## 2018-08-27 RX ORDER — LEVOTHYROXINE SODIUM 0.15 MG/1
150 TABLET ORAL
Qty: 90 TABLET | Refills: 1 | Status: SHIPPED | OUTPATIENT
Start: 2018-08-27 | End: 2018-08-27

## 2018-08-27 RX ORDER — LISINOPRIL AND HYDROCHLOROTHIAZIDE 12.5; 1 MG/1; MG/1
1 TABLET ORAL DAILY
Qty: 90 TABLET | Refills: 1 | Status: SHIPPED | OUTPATIENT
Start: 2018-08-27 | End: 2019-02-23 | Stop reason: WASHOUT

## 2018-08-27 RX ORDER — VENLAFAXINE HYDROCHLORIDE 75 MG/1
75 CAPSULE, EXTENDED RELEASE ORAL DAILY
Qty: 90 CAPSULE | Refills: 1 | Status: SHIPPED | OUTPATIENT
Start: 2018-08-27 | End: 2019-02-23

## 2018-08-27 RX ORDER — LISINOPRIL AND HYDROCHLOROTHIAZIDE 12.5; 1 MG/1; MG/1
1 TABLET ORAL DAILY
Qty: 90 TABLET | Refills: 1 | Status: SHIPPED | OUTPATIENT
Start: 2018-08-27 | End: 2018-08-27

## 2018-08-27 RX ORDER — VENLAFAXINE HYDROCHLORIDE 75 MG/1
75 CAPSULE, EXTENDED RELEASE ORAL DAILY
Qty: 90 CAPSULE | Refills: 1 | Status: SHIPPED | OUTPATIENT
Start: 2018-08-27 | End: 2018-08-27

## 2018-08-27 RX ORDER — SIMVASTATIN 20 MG
20 TABLET ORAL NIGHTLY
Qty: 90 TABLET | Refills: 1 | Status: SHIPPED | OUTPATIENT
Start: 2018-08-27 | End: 2018-08-27

## 2018-08-27 RX ORDER — SENNOSIDES 8.6 MG
2 CAPSULE ORAL EVERY OTHER DAY
COMMUNITY
Start: 2016-01-01

## 2018-08-27 RX ORDER — SIMVASTATIN 20 MG
20 TABLET ORAL NIGHTLY
Qty: 90 TABLET | Refills: 1 | Status: SHIPPED | OUTPATIENT
Start: 2018-08-27 | End: 2019-02-23

## 2018-08-27 NOTE — PATIENT INSTRUCTIONS
Vaccines  Pneumonia 23 (Pneumovax)  Shingrix (shingles)   Flu shot in fall  Please have pharmacy send us a record if you receive a vaccine      Sirena Castle's SCREENING SCHEDULE   Tests on this list are recommended by your physician but may not be cov lifetime   • Anyone with a family history    Colorectal Cancer Screening  Covered up to Age 76     Colonoscopy Screen   Covered every 10 years- more often if abnormal Colonoscopy due on 02/01/2021 Update Health Maintenance if applicable    Flex Sigmoidosco 65   Orders placed or performed in visit on 08/28/17  -PNEUMOCOCCAL VACC, 13 ISIDRO IM    Please get once after your 65th birthday    Pneumococcal 23 (Pneumovax)  Covered Once after 65 No orders found for this or any previous visit.  Please get once after your insurance carrier before scheduling to verify coverage.    PREVENTATIVE SERVICES  INDICATIONS AND SCHEDULE Internal Lab or Procedure External Lab or Procedure   Diabetes Screening      HbgA1C    At Least  Annually for Diabetics No results found for: A1C No visit. No flowsheet data found. Fecal Occult Blood   Covered Annually No results found for: FOB, OCCULTSTOOL No flowsheet data found.      Barium Enema-   uncomfortable but covered  Covered but uncomfortable   Glaucoma Screening      Ophthalmology Visit for this or any previous visit.  Medium/high risk factors:   End-stage renal disease   Hemophiliacs who received Factor VIII or IX concentrates   Clients of institutions for the mentally retarded   Persons who live in the same house as a HepB virus carrier

## 2018-08-27 NOTE — PROGRESS NOTES
HPI:   May Velásquez is a 70year old female who presents for a Medicare Initial Annual Wellness visit (Once after 12 month Medicare anniversary) .     Annual Physical due on 08/28/2018        Fall/Risk Assessment abnormal    She has been screened f planning including the explanation and discussion of advance directives standard forms performed Face to Face with patient and Family/surrogate (if present), and forms available to patient in AVS         She smoked tobacco in the past but quit greater than oz  11/16/17 : 276 lb     Last Cholesterol Labs:     Lab Results  Component Value Date   CHOLEST 126 08/13/2018   HDL 46 08/13/2018   LDL 50 08/13/2018   TRIG 152 (H) 08/13/2018          Last Chemistry Labs:     Lab Results  Component Value Date   AST 22 0 history that includes  (); tonsillectomy (); hernia surgery (); Abdominoplasty (); gastric bypass,obese<100cm kenneth-en-y (); hysterectomy (2017); and bowel resection ().     Her family history includes Diabetes in her moth normal, no drainage or sinus tenderness   Throat: Lips, mucosa, and tongue normal; teeth and gums normal   Neck: Supple, symmetrical, trachea midline, no adenopathy;  thyroid: not enlarged, symmetric, no tenderness/mass/nodules; no carotid bruit or JVD   B INTERNAL    Nail abnormality  -     DERM - INTERNAL    Hypothyroidism, unspecified type  -     Levothyroxine Sodium 150 MCG Oral Tab;  Take 1 tablet (150 mcg total) by mouth before breakfast.    Essential hypertension  -     Lisinopril-Hydrochlorothiazide 1 years Colonoscopy due on 02/01/2021 Update [facility] if applicable    Flex Sigmoidoscopy Screen every 10 years No results found for this or any previous visit. No flowsheet data found.      Fecal Occult Blood Annually No results found for: FOB No f Medicare Part B No vaccine history found This may be covered with your pharmacy  prescription benefits                    Template: [employer] MEDICARE ANNUAL ASSESSMENT FEMALE [46947]

## 2018-08-28 DIAGNOSIS — E78.5 HYPERLIPIDEMIA, UNSPECIFIED HYPERLIPIDEMIA TYPE: ICD-10-CM

## 2018-08-28 DIAGNOSIS — I10 ESSENTIAL HYPERTENSION: ICD-10-CM

## 2018-08-28 DIAGNOSIS — E03.9 HYPOTHYROIDISM, UNSPECIFIED TYPE: ICD-10-CM

## 2018-08-28 DIAGNOSIS — F32.A DEPRESSION, UNSPECIFIED DEPRESSION TYPE: ICD-10-CM

## 2018-08-28 RX ORDER — VENLAFAXINE HYDROCHLORIDE 75 MG/1
CAPSULE, EXTENDED RELEASE ORAL
Qty: 90 CAPSULE | Refills: 0 | OUTPATIENT
Start: 2018-08-28

## 2018-08-28 RX ORDER — SIMVASTATIN 20 MG
TABLET ORAL
Qty: 90 TABLET | Refills: 0 | OUTPATIENT
Start: 2018-08-28

## 2018-08-28 RX ORDER — LISINOPRIL AND HYDROCHLOROTHIAZIDE 12.5; 1 MG/1; MG/1
1 TABLET ORAL DAILY
Qty: 90 TABLET | Refills: 0 | OUTPATIENT
Start: 2018-08-28 | End: 2019-02-24

## 2018-08-28 RX ORDER — LEVOTHYROXINE SODIUM 0.15 MG/1
TABLET ORAL
Qty: 90 TABLET | Refills: 0 | OUTPATIENT
Start: 2018-08-28

## 2018-10-01 ENCOUNTER — MED REC SCAN ONLY (OUTPATIENT)
Dept: FAMILY MEDICINE CLINIC | Facility: CLINIC | Age: 63
End: 2018-10-01

## 2018-10-01 ENCOUNTER — PATIENT MESSAGE (OUTPATIENT)
Dept: FAMILY MEDICINE CLINIC | Facility: CLINIC | Age: 63
End: 2018-10-01

## 2018-10-01 DIAGNOSIS — I10 ESSENTIAL HYPERTENSION: Primary | ICD-10-CM

## 2018-10-01 NOTE — TELEPHONE ENCOUNTER
I called Dr. Ciara Retana office to verify information. Testing was ordered already by Dr. Ciara Retana office (CT). She was at their office on Thursday. We do not need to order that. She will be having CT at [facility].   They asked if we can order bun/creat and

## 2018-10-01 NOTE — TELEPHONE ENCOUNTER
From: May Velásquez  To: Burnetta Leyden, DO  Sent: 10/1/2018 1:59 PM CDT  Subject: Other    When I recently show my surgeon for a follow up on the uterine cancer I informed him that I had been experiencing some spotting.  He did a biopsy and it is arvind

## 2018-10-02 ENCOUNTER — LAB ENCOUNTER (OUTPATIENT)
Dept: LAB | Age: 63
End: 2018-10-02
Attending: FAMILY MEDICINE
Payer: MEDICARE

## 2018-10-02 ENCOUNTER — PATIENT MESSAGE (OUTPATIENT)
Dept: FAMILY MEDICINE CLINIC | Facility: CLINIC | Age: 63
End: 2018-10-02

## 2018-10-02 DIAGNOSIS — E03.9 HYPOTHYROIDISM, UNSPECIFIED TYPE: ICD-10-CM

## 2018-10-02 DIAGNOSIS — E78.5 HYPERLIPIDEMIA, UNSPECIFIED HYPERLIPIDEMIA TYPE: ICD-10-CM

## 2018-10-02 DIAGNOSIS — I10 ESSENTIAL HYPERTENSION: ICD-10-CM

## 2018-10-02 PROCEDURE — 85025 COMPLETE CBC W/AUTO DIFF WBC: CPT

## 2018-10-02 PROCEDURE — 80048 BASIC METABOLIC PNL TOTAL CA: CPT

## 2018-10-02 PROCEDURE — 36415 COLL VENOUS BLD VENIPUNCTURE: CPT

## 2018-10-02 NOTE — TELEPHONE ENCOUNTER
From: Kell Soto  To:  Jagdish Bonner DO  Sent: 10/2/2018 2:12 PM CDT  Subject: Test Results Question    I have a question about BASIC METABOLIC PANEL (8) resulted on 10/2/18 at 12:54 PM.    Could you please send results to [employer]

## 2018-10-03 ENCOUNTER — CHARTING TRANS (OUTPATIENT)
Dept: OTHER | Age: 63
End: 2018-10-03

## 2018-10-05 ENCOUNTER — TELEPHONE (OUTPATIENT)
Dept: FAMILY MEDICINE CLINIC | Facility: CLINIC | Age: 63
End: 2018-10-05

## 2018-10-05 NOTE — TELEPHONE ENCOUNTER
Patient currently at [facility] for imaging. Lauri Pham requesting, through phone and fax that BMP which was recently completed be faxed to her at [phone]. Faxed to above number.

## 2018-10-25 ENCOUNTER — OFFICE VISIT (OUTPATIENT)
Dept: FAMILY MEDICINE CLINIC | Facility: CLINIC | Age: 63
End: 2018-10-25
Payer: MEDICARE

## 2018-10-25 VITALS
WEIGHT: 282 LBS | TEMPERATURE: 98 F | RESPIRATION RATE: 16 BRPM | HEART RATE: 70 BPM | BODY MASS INDEX: 48.14 KG/M2 | DIASTOLIC BLOOD PRESSURE: 82 MMHG | SYSTOLIC BLOOD PRESSURE: 122 MMHG | HEIGHT: 64.25 IN

## 2018-10-25 DIAGNOSIS — H65.03 BILATERAL ACUTE SEROUS OTITIS MEDIA, RECURRENCE NOT SPECIFIED: Primary | ICD-10-CM

## 2018-10-25 PROCEDURE — 99213 OFFICE O/P EST LOW 20 MIN: CPT | Performed by: PHYSICIAN ASSISTANT

## 2018-10-25 RX ORDER — METHYLPREDNISOLONE 4 MG/1
TABLET ORAL
Qty: 1 KIT | Refills: 0 | Status: SHIPPED | OUTPATIENT
Start: 2018-10-25 | End: 2018-12-18 | Stop reason: ALTCHOICE

## 2018-10-25 RX ORDER — IPRATROPIUM BROMIDE 21 UG/1
2 SPRAY, METERED NASAL EVERY 12 HOURS
Qty: 1 BOTTLE | Refills: 0 | Status: SHIPPED | OUTPATIENT
Start: 2018-10-25 | End: 2019-02-28

## 2018-10-25 NOTE — PATIENT INSTRUCTIONS
Thank you for choosing Ben Ivey PA-C at [place] [address]  To Do: Sam Monteiro  1. Pt to begin medications as prescribed  2. OTC Allegra in the AM, OTC Zyrtec in PM  3. Discontinue flonase and clarinex  4.  If symptoms persist or increase c Once our office has called informing you that the insurance company approved your testing, please call Central Scheduling at [phone]  Please allow our office 5 business days to contact you regarding any testing results.     Refill policies:   Allow

## 2018-11-01 ENCOUNTER — MED REC SCAN ONLY (OUTPATIENT)
Dept: FAMILY MEDICINE CLINIC | Facility: CLINIC | Age: 63
End: 2018-11-01

## 2018-11-02 VITALS — TEMPERATURE: 98.2 F

## 2018-12-18 ENCOUNTER — OFFICE VISIT (OUTPATIENT)
Dept: FAMILY MEDICINE CLINIC | Facility: CLINIC | Age: 63
End: 2018-12-18
Payer: MEDICARE

## 2018-12-18 ENCOUNTER — HOSPITAL ENCOUNTER (OUTPATIENT)
Dept: GENERAL RADIOLOGY | Age: 63
Discharge: HOME | End: 2018-12-18
Attending: FAMILY MEDICINE
Payer: MEDICARE

## 2018-12-18 VITALS
WEIGHT: 283 LBS | HEART RATE: 76 BPM | DIASTOLIC BLOOD PRESSURE: 82 MMHG | BODY MASS INDEX: 48.32 KG/M2 | SYSTOLIC BLOOD PRESSURE: 126 MMHG | HEIGHT: 64.25 IN | RESPIRATION RATE: 16 BRPM | TEMPERATURE: 98 F

## 2018-12-18 DIAGNOSIS — M25.461 PAIN AND SWELLING OF RIGHT KNEE: Primary | ICD-10-CM

## 2018-12-18 DIAGNOSIS — M25.561 PAIN AND SWELLING OF RIGHT KNEE: ICD-10-CM

## 2018-12-18 DIAGNOSIS — M25.561 PAIN AND SWELLING OF RIGHT KNEE: Primary | ICD-10-CM

## 2018-12-18 DIAGNOSIS — M25.461 PAIN AND SWELLING OF RIGHT KNEE: ICD-10-CM

## 2018-12-18 PROCEDURE — 73560 X-RAY EXAM OF KNEE 1 OR 2: CPT | Performed by: FAMILY MEDICINE

## 2018-12-18 PROCEDURE — 99213 OFFICE O/P EST LOW 20 MIN: CPT | Performed by: FAMILY MEDICINE

## 2018-12-18 RX ORDER — HYDROCODONE BITARTRATE AND ACETAMINOPHEN 5; 325 MG/1; MG/1
1 TABLET ORAL EVERY 8 HOURS PRN
Qty: 10 TABLET | Refills: 0 | Status: SHIPPED | OUTPATIENT
Start: 2018-12-18 | End: 2019-02-28

## 2018-12-18 NOTE — PROGRESS NOTES
HPI:   Meka Lorenzo is a 70year old female that presents for R knee swelling and pain for 3 days. R knee tender in anterior and suprapateller region with surrounding edema. Same thing has occurred 2-3 times in past, improved with arthrocentesis.   No Lisinopril-Hydrochlorothiazide 10-12.5 MG Oral Tab, Take 1 tablet by mouth daily. , Disp: 90 tablet, Rfl: 1  •  simvastatin 20 MG Oral Tab, Take 1 tablet (20 mg total) by mouth nightly., Disp: 90 tablet, Rfl: 1  •  Venlafaxine HCl ER 75 MG Oral Capsule SR 2 hip and ankle. EXTREMITIES:  No edema, no cyanosis, 2+ radial pulses b/l. NEURO:  Grossly normal     ASSESSMENT AND PLAN:      1. Pain and swelling of right knee  - XR KNEE (1 OR 2 VIEWS), RIGHT (CPT=73560);  Future  - ORTHOPEDIC - INTERNAL  - likely n

## 2019-02-01 ENCOUNTER — OFFICE VISIT (OUTPATIENT)
Dept: FAMILY MEDICINE CLINIC | Facility: CLINIC | Age: 64
End: 2019-02-01
Payer: MEDICARE

## 2019-02-01 ENCOUNTER — TELEPHONE (OUTPATIENT)
Dept: FAMILY MEDICINE CLINIC | Facility: CLINIC | Age: 64
End: 2019-02-01

## 2019-02-01 VITALS
BODY MASS INDEX: 47.65 KG/M2 | OXYGEN SATURATION: 98 % | SYSTOLIC BLOOD PRESSURE: 122 MMHG | RESPIRATION RATE: 16 BRPM | TEMPERATURE: 97 F | WEIGHT: 279.13 LBS | HEART RATE: 75 BPM | DIASTOLIC BLOOD PRESSURE: 80 MMHG | HEIGHT: 64.25 IN

## 2019-02-01 DIAGNOSIS — C54.1 ENDOMETRIAL CA (HCC): Primary | ICD-10-CM

## 2019-02-01 DIAGNOSIS — Z01.818 PRE-OP EXAMINATION: ICD-10-CM

## 2019-02-01 PROCEDURE — 99213 OFFICE O/P EST LOW 20 MIN: CPT | Performed by: FAMILY MEDICINE

## 2019-02-01 NOTE — TELEPHONE ENCOUNTER
H&P completed and faxed to [facility] at [address] [place] [address] Dept at [phone] and [phone].

## 2019-02-01 NOTE — PROGRESS NOTES
HPI:   Jerica Jennings is a 70year old female that presents for pre-op exam.  Having vaginal exploration and GYN biopsy under sedation to be done on 2/12/19 with Dr. Julio Cesar Limon for recurrence of endometrial cancer.  All pre-op testing already done and review Tab, Take 1 tablet by mouth daily. , Disp: 90 tablet, Rfl: 1  •  simvastatin 20 MG Oral Tab, Take 1 tablet (20 mg total) by mouth nightly., Disp: 90 tablet, Rfl: 1  •  Venlafaxine HCl ER 75 MG Oral Capsule SR 24 Hr, Take 1 capsule (75 mg total) by mouth janice Regular rate and rhythm, no murmurs, rubs or gallops. LUNGS: Clear to auscultation bilterally, no rales/rhonchi/wheezing. ABDOMEN:  Soft, nondistended, nontender, bowel sounds normal in all 4 quadrants, no masses, no hepatosplenomegaly.   EXTREMITIES:  No

## 2019-02-20 ENCOUNTER — TELEPHONE (OUTPATIENT)
Dept: FAMILY MEDICINE CLINIC | Facility: CLINIC | Age: 64
End: 2019-02-20

## 2019-02-20 NOTE — TELEPHONE ENCOUNTER
Future Appointments   Date Time Provider Ethan Slaughter   2/28/2019 10:00 AM Mary Brown, DO [facility] [address] [facility] [address]     Patient has been notified via mychart reminder her to have her labs done 1-2 days prior to appt. Labs already ordered.

## 2019-02-25 ENCOUNTER — LAB ENCOUNTER (OUTPATIENT)
Dept: LAB | Age: 64
End: 2019-02-25
Attending: FAMILY MEDICINE
Payer: MEDICARE

## 2019-02-25 DIAGNOSIS — Z00.00 ENCOUNTER FOR ANNUAL HEALTH EXAMINATION: ICD-10-CM

## 2019-02-25 DIAGNOSIS — R73.01 IMPAIRED FASTING GLUCOSE: ICD-10-CM

## 2019-02-25 LAB
ALBUMIN SERPL-MCNC: 3.1 G/DL (ref 3.4–5)
ALBUMIN/GLOB SERPL: 0.7 {RATIO} (ref 1–2)
ALP LIVER SERPL-CCNC: 64 U/L (ref 50–130)
ALT SERPL-CCNC: 29 U/L (ref 13–56)
ANION GAP SERPL CALC-SCNC: 8 MMOL/L (ref 0–18)
AST SERPL-CCNC: 30 U/L (ref 15–37)
BASOPHILS # BLD AUTO: 0.03 X10(3) UL (ref 0–0.2)
BASOPHILS NFR BLD AUTO: 0.7 %
BILIRUB SERPL-MCNC: 0.4 MG/DL (ref 0.1–2)
BUN BLD-MCNC: 12 MG/DL (ref 7–18)
BUN/CREAT SERPL: 17.9 (ref 10–20)
CALCIUM BLD-MCNC: 8.8 MG/DL (ref 8.5–10.1)
CHLORIDE SERPL-SCNC: 107 MMOL/L (ref 98–107)
CHOLEST SMN-MCNC: 122 MG/DL (ref ?–200)
CO2 SERPL-SCNC: 28 MMOL/L (ref 21–32)
CREAT BLD-MCNC: 0.67 MG/DL (ref 0.55–1.02)
DEPRECATED RDW RBC AUTO: 51.6 FL (ref 35.1–46.3)
EOSINOPHIL # BLD AUTO: 0.46 X10(3) UL (ref 0–0.7)
EOSINOPHIL NFR BLD AUTO: 10.6 %
ERYTHROCYTE [DISTWIDTH] IN BLOOD BY AUTOMATED COUNT: 15 % (ref 11–15)
EST. AVERAGE GLUCOSE BLD GHB EST-MCNC: 108 MG/DL (ref 68–126)
GLOBULIN PLAS-MCNC: 4.3 G/DL (ref 2.8–4.4)
GLUCOSE BLD-MCNC: 96 MG/DL (ref 70–99)
HBA1C MFR BLD HPLC: 5.4 % (ref ?–5.7)
HCT VFR BLD AUTO: 40.7 % (ref 35–48)
HDLC SERPL-MCNC: 38 MG/DL (ref 40–59)
HGB BLD-MCNC: 12.9 G/DL (ref 12–16)
IMM GRANULOCYTES # BLD AUTO: 0.02 X10(3) UL (ref 0–1)
IMM GRANULOCYTES NFR BLD: 0.5 %
LDLC SERPL CALC-MCNC: 46 MG/DL (ref ?–100)
LYMPHOCYTES # BLD AUTO: 0.41 X10(3) UL (ref 1–4)
LYMPHOCYTES NFR BLD AUTO: 9.5 %
M PROTEIN MFR SERPL ELPH: 7.4 G/DL (ref 6.4–8.2)
MCH RBC QN AUTO: 29.7 PG (ref 26–34)
MCHC RBC AUTO-ENTMCNC: 31.7 G/DL (ref 31–37)
MCV RBC AUTO: 93.6 FL (ref 80–100)
MONOCYTES # BLD AUTO: 0.38 X10(3) UL (ref 0.1–1)
MONOCYTES NFR BLD AUTO: 8.8 %
NEUTROPHILS # BLD AUTO: 3.02 X10 (3) UL (ref 1.5–7.7)
NEUTROPHILS # BLD AUTO: 3.02 X10(3) UL (ref 1.5–7.7)
NEUTROPHILS NFR BLD AUTO: 69.9 %
NONHDLC SERPL-MCNC: 84 MG/DL (ref ?–130)
OSMOLALITY SERPL CALC.SUM OF ELEC: 296 MOSM/KG (ref 275–295)
POTASSIUM SERPL-SCNC: 3.9 MMOL/L (ref 3.5–5.1)
RBC # BLD AUTO: 4.35 X10(6)UL (ref 3.8–5.3)
SODIUM SERPL-SCNC: 143 MMOL/L (ref 136–145)
TRIGL SERPL-MCNC: 189 MG/DL (ref 30–149)
VLDLC SERPL CALC-MCNC: 38 MG/DL (ref 0–30)
WBC # BLD AUTO: 4.3 X10(3) UL (ref 4–11)

## 2019-02-25 PROCEDURE — 83036 HEMOGLOBIN GLYCOSYLATED A1C: CPT

## 2019-02-25 PROCEDURE — 36415 COLL VENOUS BLD VENIPUNCTURE: CPT

## 2019-02-25 PROCEDURE — 80053 COMPREHEN METABOLIC PANEL: CPT

## 2019-02-25 PROCEDURE — 85025 COMPLETE CBC W/AUTO DIFF WBC: CPT

## 2019-02-25 PROCEDURE — 80061 LIPID PANEL: CPT

## 2019-02-28 ENCOUNTER — OFFICE VISIT (OUTPATIENT)
Dept: FAMILY MEDICINE CLINIC | Facility: CLINIC | Age: 64
End: 2019-02-28
Payer: MEDICARE

## 2019-02-28 VITALS
BODY MASS INDEX: 47.29 KG/M2 | SYSTOLIC BLOOD PRESSURE: 110 MMHG | RESPIRATION RATE: 18 BRPM | WEIGHT: 277 LBS | HEART RATE: 105 BPM | TEMPERATURE: 97 F | HEIGHT: 64.25 IN | OXYGEN SATURATION: 98 % | DIASTOLIC BLOOD PRESSURE: 80 MMHG

## 2019-02-28 DIAGNOSIS — I10 ESSENTIAL HYPERTENSION: ICD-10-CM

## 2019-02-28 DIAGNOSIS — Z12.31 SCREENING MAMMOGRAM, ENCOUNTER FOR: ICD-10-CM

## 2019-02-28 DIAGNOSIS — E78.5 HYPERLIPIDEMIA, UNSPECIFIED HYPERLIPIDEMIA TYPE: ICD-10-CM

## 2019-02-28 DIAGNOSIS — C54.1 ENDOMETRIAL CA (HCC): ICD-10-CM

## 2019-02-28 DIAGNOSIS — F32.A DEPRESSION, UNSPECIFIED DEPRESSION TYPE: ICD-10-CM

## 2019-02-28 DIAGNOSIS — E03.9 ACQUIRED HYPOTHYROIDISM: Primary | ICD-10-CM

## 2019-02-28 DIAGNOSIS — J06.9 VIRAL UPPER RESPIRATORY TRACT INFECTION: ICD-10-CM

## 2019-02-28 PROCEDURE — 99214 OFFICE O/P EST MOD 30 MIN: CPT | Performed by: FAMILY MEDICINE

## 2019-02-28 RX ORDER — LEVOTHYROXINE SODIUM 150 UG/1
1 CAPSULE ORAL EVERY MORNING
COMMUNITY
End: 2019-02-28

## 2019-02-28 RX ORDER — SIMVASTATIN 20 MG
20 TABLET ORAL DAILY
Qty: 90 TABLET | Refills: 1 | Status: SHIPPED | OUTPATIENT
Start: 2019-02-28 | End: 2019-09-04

## 2019-02-28 RX ORDER — VENLAFAXINE HYDROCHLORIDE 75 MG/1
75 CAPSULE, EXTENDED RELEASE ORAL DAILY
Qty: 90 CAPSULE | Refills: 1 | Status: SHIPPED | OUTPATIENT
Start: 2019-02-28 | End: 2019-08-27

## 2019-02-28 RX ORDER — SIMVASTATIN 20 MG
1 TABLET ORAL DAILY
COMMUNITY
Start: 2010-01-01 | End: 2019-02-28

## 2019-02-28 RX ORDER — LEVOTHYROXINE SODIUM 150 UG/1
1 CAPSULE ORAL EVERY MORNING
Qty: 90 CAPSULE | Refills: 1 | Status: SHIPPED | OUTPATIENT
Start: 2019-02-28 | End: 2019-09-04

## 2019-02-28 RX ORDER — LISINOPRIL AND HYDROCHLOROTHIAZIDE 12.5; 1 MG/1; MG/1
1 TABLET ORAL DAILY
Qty: 90 TABLET | Refills: 1 | Status: SHIPPED | OUTPATIENT
Start: 2019-02-28 | End: 2019-09-04

## 2019-02-28 RX ORDER — LISINOPRIL AND HYDROCHLOROTHIAZIDE 12.5; 1 MG/1; MG/1
1 TABLET ORAL DAILY
COMMUNITY
Start: 2010-01-01 | End: 2019-02-28

## 2019-02-28 RX ORDER — AMOXICILLIN AND CLAVULANATE POTASSIUM 875; 125 MG/1; MG/1
1 TABLET, FILM COATED ORAL 2 TIMES DAILY
Qty: 14 TABLET | Refills: 0 | Status: SHIPPED | OUTPATIENT
Start: 2019-02-28 | End: 2019-03-07

## 2019-02-28 NOTE — PATIENT INSTRUCTIONS
Cough: mucinex DM twice a day (guifeniasin and dextromethorphan) . Take with a full glass of water. Nasal Congestion: Flonase nasal spray daily. Can add decongestant (sudaphed) if needed and NO history of high blood pressure.   AVOID nasal sprays like · Your appetite may be poor, so a light diet is fine. Avoid dehydration by drinking 6 to 8 glasses of fluids per day (water, soft drinks, juices, tea, or soup). Extra fluids will help loosen secretions in the nose and lungs.   · Over-the-counter cold medici

## 2019-02-28 NOTE — PROGRESS NOTES
HPI:   Zhen Perry is a 75year old female that presents for medication management. Labs completed and reviewed with patient in detail.       Depression - stable on venlafaxine  Thrombocytopenia (HCC) - stable  Hyperlipidemia - on statin and ASA, stab Morbid obesity with BMI of 45.0-49.9, adult (HCC)          Current Outpatient Medications:   •  Levothyroxine Sodium 150 MCG Oral Cap, Take 1 tablet by mouth every morning., Disp: 90 capsule, Rfl: 1  •  Lisinopril-hydroCHLOROthiazide 10-12.5 MG Oral Tab, T clear, no eye discharge    Ears: External normal. Serous fluid in R ear but no erythema or bulging, left TMs normal without erythema or effusion   Nose: patent, clear nasal discharge, no sinus tenderness, moderate congestion    Throat:  No tonsillar erythe current treatment plan discussed in detail. Questions and concerns addressed. Red flags to RTC or ED reviewed. Patient (or parent) agrees to plan. Return in about 6 months (around 8/28/2019) for annual exam, labs 1-2 days before.     Yoana Davison,

## 2019-03-06 PROBLEM — M54.12 CERVICAL RADICULOPATHY: Status: ACTIVE | Noted: 2019-03-06

## 2019-04-01 ENCOUNTER — MED REC SCAN ONLY (OUTPATIENT)
Dept: FAMILY MEDICINE CLINIC | Facility: CLINIC | Age: 64
End: 2019-04-01

## 2019-04-01 ENCOUNTER — HOSPITAL (OUTPATIENT)
Dept: OTHER | Age: 64
End: 2019-04-01
Attending: RADIOLOGY

## 2019-04-01 ENCOUNTER — PRIOR ORIGINAL RECORDS (OUTPATIENT)
Dept: OTHER | Age: 64
End: 2019-04-01

## 2019-04-11 ENCOUNTER — HOSPITAL ENCOUNTER (OUTPATIENT)
Dept: MAMMOGRAPHY | Age: 64
Discharge: HOME | End: 2019-04-11
Attending: FAMILY MEDICINE
Payer: MEDICARE

## 2019-04-11 DIAGNOSIS — Z12.31 SCREENING MAMMOGRAM, ENCOUNTER FOR: ICD-10-CM

## 2019-04-11 PROCEDURE — 77063 BREAST TOMOSYNTHESIS BI: CPT | Performed by: FAMILY MEDICINE

## 2019-04-11 PROCEDURE — 77067 SCR MAMMO BI INCL CAD: CPT | Performed by: FAMILY MEDICINE

## 2019-05-01 ENCOUNTER — HOSPITAL (OUTPATIENT)
Dept: OTHER | Age: 64
End: 2019-05-01
Attending: RADIOLOGY

## 2019-08-27 ENCOUNTER — TELEPHONE (OUTPATIENT)
Dept: FAMILY MEDICINE CLINIC | Facility: CLINIC | Age: 64
End: 2019-08-27

## 2019-08-27 DIAGNOSIS — I10 ESSENTIAL HYPERTENSION: Primary | ICD-10-CM

## 2019-08-27 DIAGNOSIS — E03.9 ACQUIRED HYPOTHYROIDISM: ICD-10-CM

## 2019-08-27 DIAGNOSIS — Z51.81 ENCOUNTER FOR THERAPEUTIC DRUG MONITORING: ICD-10-CM

## 2019-08-27 DIAGNOSIS — E78.5 HYPERLIPIDEMIA, UNSPECIFIED HYPERLIPIDEMIA TYPE: ICD-10-CM

## 2019-08-27 NOTE — TELEPHONE ENCOUNTER
Future Appointments   Date Time Provider Ethan Slaughter   9/4/2019 10:00 AM Jamarcus Brown, DO [facility] [address] [facility] [address]     Patient has been notified via [employer] reminder her to have her labs done 1-2 days prior to appt.     Labs pended for review/approval.

## 2019-08-30 ENCOUNTER — LAB ENCOUNTER (OUTPATIENT)
Dept: LAB | Age: 64
End: 2019-08-30
Attending: FAMILY MEDICINE
Payer: MEDICARE

## 2019-08-30 DIAGNOSIS — E03.9 ACQUIRED HYPOTHYROIDISM: ICD-10-CM

## 2019-08-30 DIAGNOSIS — Z51.81 ENCOUNTER FOR THERAPEUTIC DRUG MONITORING: ICD-10-CM

## 2019-08-30 DIAGNOSIS — I10 ESSENTIAL HYPERTENSION: ICD-10-CM

## 2019-08-30 DIAGNOSIS — E78.5 HYPERLIPIDEMIA, UNSPECIFIED HYPERLIPIDEMIA TYPE: ICD-10-CM

## 2019-08-30 LAB
ALBUMIN SERPL-MCNC: 3.5 G/DL (ref 3.4–5)
ALBUMIN/GLOB SERPL: 0.9 {RATIO} (ref 1–2)
ALP LIVER SERPL-CCNC: 71 U/L (ref 55–142)
ALT SERPL-CCNC: 28 U/L (ref 13–56)
ANION GAP SERPL CALC-SCNC: 7 MMOL/L (ref 0–18)
AST SERPL-CCNC: 23 U/L (ref 15–37)
BASOPHILS # BLD AUTO: 0.04 X10(3) UL (ref 0–0.2)
BASOPHILS NFR BLD AUTO: 0.7 %
BILIRUB SERPL-MCNC: 0.4 MG/DL (ref 0.1–2)
BUN BLD-MCNC: 18 MG/DL (ref 7–18)
BUN/CREAT SERPL: 21.4 (ref 10–20)
CALCIUM BLD-MCNC: 9.3 MG/DL (ref 8.5–10.1)
CHLORIDE SERPL-SCNC: 105 MMOL/L (ref 98–112)
CHOLEST SMN-MCNC: 119 MG/DL (ref ?–200)
CO2 SERPL-SCNC: 28 MMOL/L (ref 21–32)
CREAT BLD-MCNC: 0.84 MG/DL (ref 0.55–1.02)
DEPRECATED RDW RBC AUTO: 50.3 FL (ref 35.1–46.3)
EOSINOPHIL # BLD AUTO: 0.21 X10(3) UL (ref 0–0.7)
EOSINOPHIL NFR BLD AUTO: 3.8 %
ERYTHROCYTE [DISTWIDTH] IN BLOOD BY AUTOMATED COUNT: 15.2 % (ref 11–15)
GLOBULIN PLAS-MCNC: 3.9 G/DL (ref 2.8–4.4)
GLUCOSE BLD-MCNC: 98 MG/DL (ref 70–99)
HCT VFR BLD AUTO: 42.5 % (ref 35–48)
HDLC SERPL-MCNC: 42 MG/DL (ref 40–59)
HGB BLD-MCNC: 13.5 G/DL (ref 12–16)
IMM GRANULOCYTES # BLD AUTO: 0.02 X10(3) UL (ref 0–1)
IMM GRANULOCYTES NFR BLD: 0.4 %
LDLC SERPL CALC-MCNC: 48 MG/DL (ref ?–100)
LYMPHOCYTES # BLD AUTO: 0.8 X10(3) UL (ref 1–4)
LYMPHOCYTES NFR BLD AUTO: 14.4 %
M PROTEIN MFR SERPL ELPH: 7.4 G/DL (ref 6.4–8.2)
MCH RBC QN AUTO: 28.4 PG (ref 26–34)
MCHC RBC AUTO-ENTMCNC: 31.8 G/DL (ref 31–37)
MCV RBC AUTO: 89.5 FL (ref 80–100)
MONOCYTES # BLD AUTO: 0.38 X10(3) UL (ref 0.1–1)
MONOCYTES NFR BLD AUTO: 6.8 %
MORPHOLOGY: NORMAL
NEUTROPHILS # BLD AUTO: 4.1 X10 (3) UL (ref 1.5–7.7)
NEUTROPHILS # BLD AUTO: 4.1 X10(3) UL (ref 1.5–7.7)
NEUTROPHILS NFR BLD AUTO: 73.9 %
NONHDLC SERPL-MCNC: 77 MG/DL (ref ?–130)
OSMOLALITY SERPL CALC.SUM OF ELEC: 292 MOSM/KG (ref 275–295)
POTASSIUM SERPL-SCNC: 3.9 MMOL/L (ref 3.5–5.1)
RBC # BLD AUTO: 4.75 X10(6)UL (ref 3.8–5.3)
SODIUM SERPL-SCNC: 140 MMOL/L (ref 136–145)
TRIGL SERPL-MCNC: 147 MG/DL (ref 30–149)
TSI SER-ACNC: 1.48 MIU/ML (ref 0.36–3.74)
VLDLC SERPL CALC-MCNC: 29 MG/DL (ref 0–30)
WBC # BLD AUTO: 5.6 X10(3) UL (ref 4–11)

## 2019-08-30 PROCEDURE — 36415 COLL VENOUS BLD VENIPUNCTURE: CPT

## 2019-08-30 PROCEDURE — 84443 ASSAY THYROID STIM HORMONE: CPT

## 2019-08-30 PROCEDURE — 80053 COMPREHEN METABOLIC PANEL: CPT

## 2019-08-30 PROCEDURE — 80061 LIPID PANEL: CPT

## 2019-08-30 PROCEDURE — 85025 COMPLETE CBC W/AUTO DIFF WBC: CPT

## 2019-09-04 ENCOUNTER — OFFICE VISIT (OUTPATIENT)
Dept: FAMILY MEDICINE CLINIC | Facility: CLINIC | Age: 64
End: 2019-09-04
Payer: MEDICARE

## 2019-09-04 VITALS
RESPIRATION RATE: 16 BRPM | BODY MASS INDEX: 46.44 KG/M2 | TEMPERATURE: 98 F | HEIGHT: 64.25 IN | HEART RATE: 86 BPM | OXYGEN SATURATION: 97 % | SYSTOLIC BLOOD PRESSURE: 118 MMHG | WEIGHT: 272 LBS | DIASTOLIC BLOOD PRESSURE: 70 MMHG

## 2019-09-04 DIAGNOSIS — Z00.00 ENCOUNTER FOR ANNUAL HEALTH EXAMINATION: Primary | ICD-10-CM

## 2019-09-04 DIAGNOSIS — H93.8X2 SENSATION OF FULLNESS IN LEFT EAR: ICD-10-CM

## 2019-09-04 DIAGNOSIS — E03.9 ACQUIRED HYPOTHYROIDISM: ICD-10-CM

## 2019-09-04 DIAGNOSIS — F33.41 RECURRENT MAJOR DEPRESSIVE DISORDER, IN PARTIAL REMISSION (HCC): ICD-10-CM

## 2019-09-04 DIAGNOSIS — Z13.31 POSITIVE DEPRESSION SCREENING: ICD-10-CM

## 2019-09-04 DIAGNOSIS — I10 ESSENTIAL HYPERTENSION: ICD-10-CM

## 2019-09-04 DIAGNOSIS — E78.5 HYPERLIPIDEMIA, UNSPECIFIED HYPERLIPIDEMIA TYPE: ICD-10-CM

## 2019-09-04 PROCEDURE — 99213 OFFICE O/P EST LOW 20 MIN: CPT | Performed by: FAMILY MEDICINE

## 2019-09-04 PROCEDURE — G0439 PPPS, SUBSEQ VISIT: HCPCS | Performed by: FAMILY MEDICINE

## 2019-09-04 RX ORDER — LEVOTHYROXINE SODIUM 150 UG/1
1 CAPSULE ORAL EVERY MORNING
Qty: 90 CAPSULE | Refills: 3 | Status: SHIPPED | OUTPATIENT
Start: 2019-09-04 | End: 2020-09-08

## 2019-09-04 RX ORDER — VENLAFAXINE HYDROCHLORIDE 75 MG/1
1 CAPSULE, EXTENDED RELEASE ORAL DAILY
COMMUNITY
End: 2019-09-04

## 2019-09-04 RX ORDER — VENLAFAXINE HYDROCHLORIDE 75 MG/1
75 CAPSULE, EXTENDED RELEASE ORAL DAILY
Qty: 90 CAPSULE | Refills: 3 | Status: SHIPPED | OUTPATIENT
Start: 2019-09-04 | End: 2020-09-08

## 2019-09-04 RX ORDER — LISINOPRIL AND HYDROCHLOROTHIAZIDE 12.5; 1 MG/1; MG/1
1 TABLET ORAL DAILY
Qty: 90 TABLET | Refills: 3 | Status: SHIPPED | OUTPATIENT
Start: 2019-09-04 | End: 2020-09-08

## 2019-09-04 RX ORDER — SIMVASTATIN 20 MG
20 TABLET ORAL DAILY
Qty: 90 TABLET | Refills: 3 | Status: SHIPPED | OUTPATIENT
Start: 2019-09-04 | End: 2020-10-09

## 2019-09-04 NOTE — PATIENT INSTRUCTIONS
Continue allergy pill and nasal spray for ear fullness. Can also add decongestant (ex: sudafed) for 1 week. Monitor blood pressure while taking, goal < 140/90. Follow up with ENT if not improving.          Sd Castle's SCREENING SCHEDULE   Tests o are 73-68 years old and have smoked more than 100 cigarettes in their lifetime   • Anyone with a family history    Colorectal Cancer Screening  Covered up to Age 76     Colonoscopy Screen   Covered every 10 years- more often if abnormal Colonoscopy due on Please get every year    Pneumococcal 13 (Prevnar)  Covered Once after 65 Orders placed or performed in visit on 08/28/17   • PNEUMOCOCCAL VACC, 13 ISIDRO IM    Please get once after your 65th birthday    Pneumococcal 23 (Pneumovax)  Covered Once after 65 No

## 2019-09-04 NOTE — PROGRESS NOTES
HPI:   Meka Lorenzo is a 75year old female who presents for a medicare subsequent visit. UTD on health maintenance. Diet varied, often unhealthy, no regular exercise. Labs completed and d/w pt in detail. She will get flu shot at [facility]. Ability/Status   Brittany Gilmore has some abnormal functions as listed below:  She has no issues with dressing and bathing based on screening of functional status. She has Hearing problems based on screening of functional status. ([address]     Hyperlipidemia     Essential hypertension     Hypothyroidism     Seasonal allergies     Endometrial ca [facility])     Iritis     Arthritis     Osteopenia     Cataract     Nail abnormality     Cervical radiculopathy    Wt Readings from Last 3 Encounter INFORMATION:   She  has a past medical history of Arthritis, Depression (2/27/2018), High cholesterol, Hypertension, Hypothyroid, Iritis, Obesity, Seasonal allergies (2/27/2018), [facility] (2/27/2018), and Uterine cancer ([facility]     She  has a p distress, appears stated age   Head:  Normocephalic, without obvious abnormality, atraumatic   Eyes:  PERRL, conjunctiva/corneas clear, EOM's intact both eyes   Ears:  Normal TM's and external ear canals, both ears   Nose: Nares normal, septum midline,muco screening  - as above, some stress and worry about cancer hx, overall stable on current meds, will ctm closely    Recurrent major depressive disorder, in partial remission (HCC)  -     Venlafaxine HCl ER 75 MG Oral Capsule SR 24 Hr;  Take 1 capsule (75 mg t 08/30/2019 98          Cardiovascular Disease Screening     LDL Annually LDL Cholesterol (mg/dL)   Date Value   08/30/2019 48        EKG - w/ Initial Preventative Physical Exam only, or if medically necessary Electrocardiogram date08/27/2018       Janelle men   Illicit injectable drug abusers     Tetanus Toxoid  Only covered with a cut with metal- TD and TDaP Not covered by Medicare Part B No vaccine history found This may be covered with your prescription benefits, but Medicare does not cover unless Medica

## 2019-09-05 ENCOUNTER — TELEPHONE (OUTPATIENT)
Dept: FAMILY MEDICINE CLINIC | Facility: CLINIC | Age: 64
End: 2019-09-05

## 2019-09-05 NOTE — TELEPHONE ENCOUNTER
Pharmacy needs clarification. Levothyroxine Sodium 150 MCG Oral Cap    In the past, pt was taking tablets and script if for capsules. Please call pharmacy back.

## 2019-09-18 ENCOUNTER — WALK IN (OUTPATIENT)
Dept: URGENT CARE | Age: 64
End: 2019-09-18

## 2019-09-18 DIAGNOSIS — Z23 NEED FOR IMMUNIZATION AGAINST INFLUENZA: Primary | ICD-10-CM

## 2019-09-18 PROCEDURE — 90472 IMMUNIZATION ADMIN EACH ADD: CPT | Performed by: NURSE PRACTITIONER

## 2019-09-18 PROCEDURE — G0008 ADMIN INFLUENZA VIRUS VAC: HCPCS | Performed by: NURSE PRACTITIONER

## 2019-09-18 PROCEDURE — 90662 IIV NO PRSV INCREASED AG IM: CPT | Performed by: NURSE PRACTITIONER

## 2019-09-18 PROCEDURE — 90715 TDAP VACCINE 7 YRS/> IM: CPT | Performed by: NURSE PRACTITIONER

## 2019-10-09 DIAGNOSIS — E78.5 HYPERLIPIDEMIA, UNSPECIFIED HYPERLIPIDEMIA TYPE: ICD-10-CM

## 2019-10-10 RX ORDER — SIMVASTATIN 20 MG
TABLET ORAL
Qty: 90 TABLET | Refills: 3 | OUTPATIENT
Start: 2019-10-10

## 2019-10-10 NOTE — TELEPHONE ENCOUNTER
Requested Medications      Name from pharmacy: SIMVASTATIN 20MG TABLETS         Will file in chart as: SIMVASTATIN 20 MG Oral Tab    Sig: TAKE 1 TABLET BY MOUTH DAILY    Disp:  90 tablet    Refills:  3 (Pharmacy requested: Not specified)    Start: 10/9/201

## 2019-11-20 ENCOUNTER — PATIENT MESSAGE (OUTPATIENT)
Dept: FAMILY MEDICINE CLINIC | Facility: CLINIC | Age: 64
End: 2019-11-20

## 2019-11-20 DIAGNOSIS — H92.03 EAR PAIN, BILATERAL: Primary | ICD-10-CM

## 2019-11-20 NOTE — TELEPHONE ENCOUNTER
LOV 9/4/19  Sensation of fullness in left ear  - Continue allergy pill and nasal spray for ear fullness. Can also add decongestant (ex: sudafed) for 1 week. Monitor blood pressure while taking, goal < 140/90. Follow up with ENT if not improving.        P

## 2019-11-20 NOTE — TELEPHONE ENCOUNTER
From: Komal Iraheta  To: Debra Gilmar   Sent: 11/20/2019 9:38 AM CST  Subject: Referral Request    Good Morning Dr. Caroline King. At my last visit we discussed the problem with my ears, specifically the left ear.  The situation has not resolved even aft

## 2020-02-09 VITALS
HEART RATE: 76 BPM | BODY MASS INDEX: 47.01 KG/M2 | SYSTOLIC BLOOD PRESSURE: 122 MMHG | HEIGHT: 64 IN | DIASTOLIC BLOOD PRESSURE: 88 MMHG | WEIGHT: 275.35 LBS

## 2020-02-18 ENCOUNTER — PATIENT MESSAGE (OUTPATIENT)
Dept: FAMILY MEDICINE CLINIC | Facility: CLINIC | Age: 65
End: 2020-02-18

## 2020-02-18 DIAGNOSIS — Z12.31 ENCOUNTER FOR SCREENING MAMMOGRAM FOR MALIGNANT NEOPLASM OF BREAST: Primary | ICD-10-CM

## 2020-02-18 NOTE — TELEPHONE ENCOUNTER
From: Alberto Pittman  To: Kaleigh Allen DO  Sent: 2/18/2020 8:37 AM CST  Subject: Other    Good morning Dr Joseph Jenkins.  I am due for my annual mammogram in April and would like to schedule it now for that time so I am requesting an order for it and inform

## 2020-02-25 ENCOUNTER — TELEPHONE (OUTPATIENT)
Dept: FAMILY MEDICINE CLINIC | Facility: CLINIC | Age: 65
End: 2020-02-25

## 2020-02-25 NOTE — TELEPHONE ENCOUNTER
Future Appointments   Date Time Provider Ethan Slaughter   3/4/2020 10:30 AM Rd Brown, DO [facility] [address] [facility] [address]   3/9/2020  3:15 PM [facility] MRI [facility]AD DI [facility]   4/13/2020  9:00 AM PF CHLOÉ RM1 PF MAMMO [place]     Patient has been notified via

## 2020-03-02 ENCOUNTER — APPOINTMENT (OUTPATIENT)
Dept: LAB | Age: 65
End: 2020-03-02
Attending: FAMILY MEDICINE
Payer: MEDICARE

## 2020-03-02 DIAGNOSIS — I10 ESSENTIAL HYPERTENSION: ICD-10-CM

## 2020-03-02 DIAGNOSIS — E78.5 HYPERLIPIDEMIA, UNSPECIFIED HYPERLIPIDEMIA TYPE: ICD-10-CM

## 2020-03-02 LAB
ALBUMIN SERPL-MCNC: 3.1 G/DL (ref 3.4–5)
ALBUMIN/GLOB SERPL: 0.7 {RATIO} (ref 1–2)
ALP LIVER SERPL-CCNC: 56 U/L (ref 55–142)
ALT SERPL-CCNC: 34 U/L (ref 13–56)
ANION GAP SERPL CALC-SCNC: 7 MMOL/L (ref 0–18)
AST SERPL-CCNC: 24 U/L (ref 15–37)
BILIRUB SERPL-MCNC: 0.4 MG/DL (ref 0.1–2)
BUN BLD-MCNC: 19 MG/DL (ref 7–18)
BUN/CREAT SERPL: 21.1 (ref 10–20)
CALCIUM BLD-MCNC: 8.7 MG/DL (ref 8.5–10.1)
CHLORIDE SERPL-SCNC: 106 MMOL/L (ref 98–112)
CHOLEST SMN-MCNC: 138 MG/DL (ref ?–200)
CO2 SERPL-SCNC: 27 MMOL/L (ref 21–32)
CREAT BLD-MCNC: 0.9 MG/DL (ref 0.55–1.02)
GLOBULIN PLAS-MCNC: 4.4 G/DL (ref 2.8–4.4)
GLUCOSE BLD-MCNC: 103 MG/DL (ref 70–99)
HDLC SERPL-MCNC: 45 MG/DL (ref 40–59)
LDLC SERPL CALC-MCNC: 62 MG/DL (ref ?–100)
M PROTEIN MFR SERPL ELPH: 7.5 G/DL (ref 6.4–8.2)
NONHDLC SERPL-MCNC: 93 MG/DL (ref ?–130)
OSMOLALITY SERPL CALC.SUM OF ELEC: 293 MOSM/KG (ref 275–295)
PATIENT FASTING Y/N/NP: YES
PATIENT FASTING Y/N/NP: YES
POTASSIUM SERPL-SCNC: 3.6 MMOL/L (ref 3.5–5.1)
SODIUM SERPL-SCNC: 140 MMOL/L (ref 136–145)
TRIGL SERPL-MCNC: 156 MG/DL (ref 30–149)
VLDLC SERPL CALC-MCNC: 31 MG/DL (ref 0–30)

## 2020-03-02 PROCEDURE — 36415 COLL VENOUS BLD VENIPUNCTURE: CPT

## 2020-03-02 PROCEDURE — 80061 LIPID PANEL: CPT

## 2020-03-02 PROCEDURE — 80053 COMPREHEN METABOLIC PANEL: CPT

## 2020-03-04 ENCOUNTER — OFFICE VISIT (OUTPATIENT)
Dept: FAMILY MEDICINE CLINIC | Facility: CLINIC | Age: 65
End: 2020-03-04
Payer: MEDICARE

## 2020-03-04 VITALS
HEART RATE: 72 BPM | BODY MASS INDEX: 47.18 KG/M2 | TEMPERATURE: 98 F | HEIGHT: 64.25 IN | DIASTOLIC BLOOD PRESSURE: 80 MMHG | WEIGHT: 276.38 LBS | SYSTOLIC BLOOD PRESSURE: 110 MMHG

## 2020-03-04 DIAGNOSIS — F33.41 RECURRENT MAJOR DEPRESSIVE DISORDER, IN PARTIAL REMISSION (HCC): ICD-10-CM

## 2020-03-04 DIAGNOSIS — Z00.00 LABORATORY EXAM ORDERED AS PART OF ROUTINE GENERAL MEDICAL EXAMINATION: ICD-10-CM

## 2020-03-04 DIAGNOSIS — I10 ESSENTIAL HYPERTENSION: ICD-10-CM

## 2020-03-04 DIAGNOSIS — Z23 NEED FOR VACCINATION: ICD-10-CM

## 2020-03-04 DIAGNOSIS — R73.01 IMPAIRED FASTING GLUCOSE: ICD-10-CM

## 2020-03-04 DIAGNOSIS — E78.5 HYPERLIPIDEMIA, UNSPECIFIED HYPERLIPIDEMIA TYPE: Primary | ICD-10-CM

## 2020-03-04 DIAGNOSIS — E03.9 ACQUIRED HYPOTHYROIDISM: ICD-10-CM

## 2020-03-04 PROCEDURE — 99213 OFFICE O/P EST LOW 20 MIN: CPT | Performed by: FAMILY MEDICINE

## 2020-03-04 PROCEDURE — 90750 HZV VACC RECOMBINANT IM: CPT | Performed by: FAMILY MEDICINE

## 2020-03-04 PROCEDURE — 90471 IMMUNIZATION ADMIN: CPT | Performed by: FAMILY MEDICINE

## 2020-03-04 NOTE — PROGRESS NOTES
HPI:   Mendel Rand is a 77year old female that presents for medication management. Labs completed and d/w patient in detail. She has mammogram upcoming, due for 2nd shingrix, otherwise utd on health maintenance.       HTN stable on lisinopril hctz w 3  •  Acetaminophen ER (TYLENOL 8 HOUR ARTHRITIS PAIN) 650 MG Oral Tab CR, Take 2 tablets by mouth every other day., Disp: , Rfl:   •  Vitamin B-12 1000 MCG Oral Tab, Take 1,000 mcg by mouth daily. , Disp: , Rfl:   •  Fluticasone Propionate 50 MCG/ACT Nasal METABOLIC PANEL (14); Future  - LIPID PANEL; Future  - stable on statin  - continue to work on healthy diet and regular exercise    2. Essential hypertension  - COMP METABOLIC PANEL (14); Future  - stable on lisinopril-hctz    3.  Recurrent major depressive

## 2020-05-05 ENCOUNTER — HOSPITAL ENCOUNTER (OUTPATIENT)
Dept: MAMMOGRAPHY | Age: 65
Discharge: HOME | End: 2020-05-05
Attending: FAMILY MEDICINE
Payer: MEDICARE

## 2020-05-05 DIAGNOSIS — Z12.31 ENCOUNTER FOR SCREENING MAMMOGRAM FOR MALIGNANT NEOPLASM OF BREAST: ICD-10-CM

## 2020-05-05 PROCEDURE — 77067 SCR MAMMO BI INCL CAD: CPT | Performed by: FAMILY MEDICINE

## 2020-05-05 PROCEDURE — 77063 BREAST TOMOSYNTHESIS BI: CPT | Performed by: FAMILY MEDICINE

## 2020-05-13 ENCOUNTER — TELEPHONE (OUTPATIENT)
Dept: FAMILY MEDICINE CLINIC | Facility: CLINIC | Age: 65
End: 2020-05-13

## 2020-05-13 ENCOUNTER — OFFICE VISIT (OUTPATIENT)
Dept: FAMILY MEDICINE CLINIC | Facility: CLINIC | Age: 65
End: 2020-05-13
Payer: MEDICARE

## 2020-05-13 VITALS
HEIGHT: 64.25 IN | RESPIRATION RATE: 16 BRPM | TEMPERATURE: 99 F | HEART RATE: 70 BPM | SYSTOLIC BLOOD PRESSURE: 116 MMHG | DIASTOLIC BLOOD PRESSURE: 80 MMHG | BODY MASS INDEX: 47.46 KG/M2 | WEIGHT: 278 LBS | OXYGEN SATURATION: 99 %

## 2020-05-13 DIAGNOSIS — Q01.9 ENCEPHALOCELE (HCC): Primary | ICD-10-CM

## 2020-05-13 DIAGNOSIS — Z01.818 PRE-OP EVALUATION: ICD-10-CM

## 2020-05-13 PROCEDURE — 99214 OFFICE O/P EST MOD 30 MIN: CPT | Performed by: FAMILY MEDICINE

## 2020-05-13 PROCEDURE — 93000 ELECTROCARDIOGRAM COMPLETE: CPT | Performed by: FAMILY MEDICINE

## 2020-05-13 NOTE — PATIENT INSTRUCTIONS
Central Scheduling [phone]      Any pre-op testing results will also be faxed to your surgery team    Thank you for allowing me to participate in your care today. I will contact you with any results from today's visit.   Lab results are typically av instructions carefully on how to take them. · If you are told to take blood thinners to help prevent blood clots after surgery, be sure to follow the instructions on how to take them. Stop smoking  If you smoke, healing may take longer.  So at least 2 wee

## 2020-05-13 NOTE — PROGRESS NOTES
HPI:   Clare Jeong is a 77year old female that presents for pre op exam.  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 Done by Dr. Rolo Flores.      Anesthesia: general Lisinopril-hydroCHLOROthiazide 10-12.5 MG Oral Tab, Take 1 tablet by mouth daily. , Disp: 90 tablet, Rfl: 3  •  simvastatin 20 MG Oral Tab, Take 1 tablet (20 mg total) by mouth daily. , Disp: 90 tablet, Rfl: 3  •  Acetaminophen ER (TYLENOL 8 HOUR ARTHRITIS P turgor. HEART:  Regular rate and rhythm, no murmurs, rubs or gallops. LUNGS: Clear to auscultation bilterally, no rales/rhonchi/wheezing. ABDOMEN:  Soft, nondistended, nontender, bowel sounds normal in all 4 quadrants, no masses, no hepatosplenomegaly.

## 2020-05-15 DIAGNOSIS — I10 ESSENTIAL HYPERTENSION: ICD-10-CM

## 2020-05-15 DIAGNOSIS — F33.41 RECURRENT MAJOR DEPRESSIVE DISORDER, IN PARTIAL REMISSION (HCC): ICD-10-CM

## 2020-05-15 DIAGNOSIS — E03.9 ACQUIRED HYPOTHYROIDISM: ICD-10-CM

## 2020-05-15 RX ORDER — LEVOTHYROXINE SODIUM 0.15 MG/1
TABLET ORAL
Qty: 90 TABLET | Refills: 0 | OUTPATIENT
Start: 2020-05-15

## 2020-05-15 RX ORDER — VENLAFAXINE HYDROCHLORIDE 75 MG/1
CAPSULE, EXTENDED RELEASE ORAL
Qty: 90 CAPSULE | Refills: 3 | OUTPATIENT
Start: 2020-05-15

## 2020-05-15 RX ORDER — LISINOPRIL AND HYDROCHLOROTHIAZIDE 12.5; 1 MG/1; MG/1
1 TABLET ORAL DAILY
Qty: 90 TABLET | Refills: 3 | OUTPATIENT
Start: 2020-05-15

## 2020-05-15 NOTE — TELEPHONE ENCOUNTER
Thyroid Supplements Protocol Passed5/15 11:03 AM   TSH test in past 12 months    TSH value between 0.350 and 5.500 IU/ml    Appointment in past 12 or next 3 months       Hypertension Medications Protocol Passed5/15 11:03 AM   CMP or BMP in past 12 months

## 2020-05-16 ENCOUNTER — LAB ENCOUNTER (OUTPATIENT)
Dept: LAB | Age: 65
End: 2020-05-16
Attending: FAMILY MEDICINE
Payer: MEDICARE

## 2020-05-16 DIAGNOSIS — Z01.818 PRE-OP EVALUATION: ICD-10-CM

## 2020-05-16 PROCEDURE — 80053 COMPREHEN METABOLIC PANEL: CPT

## 2020-05-16 PROCEDURE — 36415 COLL VENOUS BLD VENIPUNCTURE: CPT

## 2020-05-16 PROCEDURE — 85025 COMPLETE CBC W/AUTO DIFF WBC: CPT

## 2020-09-08 ENCOUNTER — OFFICE VISIT (OUTPATIENT)
Dept: FAMILY MEDICINE CLINIC | Facility: CLINIC | Age: 65
End: 2020-09-08
Payer: MEDICARE

## 2020-09-08 VITALS
BODY MASS INDEX: 45.43 KG/M2 | RESPIRATION RATE: 16 BRPM | HEIGHT: 64.25 IN | HEART RATE: 80 BPM | SYSTOLIC BLOOD PRESSURE: 118 MMHG | DIASTOLIC BLOOD PRESSURE: 70 MMHG | TEMPERATURE: 97 F | WEIGHT: 266.13 LBS

## 2020-09-08 DIAGNOSIS — E03.9 ACQUIRED HYPOTHYROIDISM: ICD-10-CM

## 2020-09-08 DIAGNOSIS — Z12.11 COLON CANCER SCREENING: ICD-10-CM

## 2020-09-08 DIAGNOSIS — Z00.00 ENCOUNTER FOR ANNUAL HEALTH EXAMINATION: Primary | ICD-10-CM

## 2020-09-08 DIAGNOSIS — I10 ESSENTIAL HYPERTENSION: ICD-10-CM

## 2020-09-08 DIAGNOSIS — Z23 NEED FOR VACCINATION: ICD-10-CM

## 2020-09-08 DIAGNOSIS — Z78.0 POSTMENOPAUSAL: ICD-10-CM

## 2020-09-08 DIAGNOSIS — F33.41 RECURRENT MAJOR DEPRESSIVE DISORDER, IN PARTIAL REMISSION (HCC): ICD-10-CM

## 2020-09-08 PROCEDURE — G0008 ADMIN INFLUENZA VIRUS VAC: HCPCS | Performed by: FAMILY MEDICINE

## 2020-09-08 PROCEDURE — G0439 PPPS, SUBSEQ VISIT: HCPCS | Performed by: FAMILY MEDICINE

## 2020-09-08 PROCEDURE — 90662 IIV NO PRSV INCREASED AG IM: CPT | Performed by: FAMILY MEDICINE

## 2020-09-08 RX ORDER — LISINOPRIL AND HYDROCHLOROTHIAZIDE 12.5; 1 MG/1; MG/1
1 TABLET ORAL DAILY
Qty: 90 TABLET | Refills: 3 | Status: SHIPPED | OUTPATIENT
Start: 2020-09-08 | End: 2020-09-21

## 2020-09-08 RX ORDER — LEVOTHYROXINE SODIUM 150 UG/1
1 CAPSULE ORAL EVERY MORNING
Qty: 90 CAPSULE | Refills: 3 | Status: SHIPPED | OUTPATIENT
Start: 2020-09-08 | End: 2021-09-09

## 2020-09-08 RX ORDER — VENLAFAXINE HYDROCHLORIDE 75 MG/1
75 CAPSULE, EXTENDED RELEASE ORAL DAILY
Qty: 90 CAPSULE | Refills: 3 | Status: SHIPPED | OUTPATIENT
Start: 2020-09-08 | End: 2021-09-09

## 2020-09-08 NOTE — PATIENT INSTRUCTIONS
Trey Kocher Douglas's SCREENING SCHEDULE   Tests on this list are recommended by your physician but may not be covered, or covered at this frequency, by your insurer. Please check with your insurance carrier before scheduling to verify coverage.    Machelle Hauser Covered every 10 years- more often if abnormal Colonoscopy due on 02/01/2021 Update Health Maintenance if applicable    Flex Sigmoidoscopy Screen  Covered every 5 years No results found for this or any previous visit. No flowsheet data found.      Fecal O birthday    Pneumococcal 23 (Pneumovax)  Covered Once after 65 No orders found for this or any previous visit. Please get once after your 65th birthday    Hepatitis B for Moderate/High Risk       No orders found for this or any previous visit.  Medium/high

## 2020-09-08 NOTE — PROGRESS NOTES
HPI:   Isela Morlaes is a 77year old female who presents for a Medicare Subsequent Annual Wellness visit (Pt already had Initial Annual Wellness). Due for DEXA, otherwise, UTD on health maintenance.    Diet varied, often unhealthy, no regular exer Tobacco comment: occ smoker 15 yrs ago         CAGE Alcohol screening   Meka Lorenzo was screened for Alcohol abuse and had a score of 0 so is at low risk.     Patient Care Team: Patient Care Team:  Simeon Cowart DO as PCP - General (Family Medicin Take 1 tablet (20 mg total) by mouth daily. Acetaminophen ER (TYLENOL 8 HOUR ARTHRITIS PAIN) 650 MG Oral Tab CR, Take 2 tablets by mouth every other day. Vitamin B-12 1000 MCG Oral Tab, Take 1,000 mcg by mouth daily.   Fluticasone Propionate 50 MCG/ACT Na incontinence   MUSCULOSKELETAL: denies back pain  NEURO: denies headaches  PSYCHE: denies depression or anxiety  HEMATOLOGIC: denies hx of anemia  ENDOCRINE: + thyroid history  ALL/ASTHMA: denies hx of allergy or asthma    EXAM:   /70   Pulse 80   Te Administered Date(s) Administered   • FLU VAC High Dose 65 YRS & Older PRSV Free (72431) 10/03/2018   • FLUZONE 6 months and older PFS 0.5 ml (71892) 09/22/2017   • Fluvirin, 3 Years & >, Im 09/25/2012, 09/11/2013, 09/11/2014   • Fluzone Vaccine Medicare exercise. Return in about 6 months (around 3/8/2021) for blood pressure check.      Yun Huynh, DO  Family Medicine       General Health     In the past six months, have you lost more than 10 pounds without trying?: 2 - No  Has your appetite been po Maintenance if applicable    Chlamydia  Annually if high risk No results found for: CHLAMYDIA No flowsheet data found.     Screening Mammogram      Mammogram Annually to 74, then as discussed Mammogram due on 05/05/2021 Update Health Maintenance if applicab

## 2020-09-21 ENCOUNTER — PATIENT MESSAGE (OUTPATIENT)
Dept: FAMILY MEDICINE CLINIC | Facility: CLINIC | Age: 65
End: 2020-09-21

## 2020-09-21 DIAGNOSIS — I10 ESSENTIAL HYPERTENSION: ICD-10-CM

## 2020-09-21 RX ORDER — LISINOPRIL AND HYDROCHLOROTHIAZIDE 12.5; 1 MG/1; MG/1
1 TABLET ORAL DAILY
Qty: 30 TABLET | Refills: 0 | Status: SHIPPED | OUTPATIENT
Start: 2020-09-21 | End: 2020-10-05 | Stop reason: RX

## 2020-09-21 NOTE — TELEPHONE ENCOUNTER
From: Brandan Coates  To:  Timoteo Dickey DO  Sent: 9/21/2020 12:05 PM CDT  Subject: Prescription Question    My mail order pharmacy has a back order on my Rx for Lisinipril/hctz 10/12.5 and it will not be in until 9/27 at the earliest. Could I get a 3

## 2020-09-22 ENCOUNTER — TELEPHONE (OUTPATIENT)
Dept: FAMILY MEDICINE CLINIC | Facility: CLINIC | Age: 65
End: 2020-09-22

## 2020-09-22 DIAGNOSIS — D69.6 THROMBOCYTOPENIA (HCC): Primary | ICD-10-CM

## 2020-09-22 NOTE — TELEPHONE ENCOUNTER
On call- received a call at 8:42AM  Pt needs a different lab order. Note says one of the orders is incorrect. Please call Sandra Penaloza [phone]. Her labs are going to be done tomorrow.

## 2020-09-22 NOTE — TELEPHONE ENCOUNTER
See TE, lab asking for platelet count on citrated blood be added to pt's lab for they can use the blue sodium citrate tube when they draw pt's labs tomorrow.  Order pended for approval/denial.

## 2020-09-22 NOTE — TELEPHONE ENCOUNTER
Fartun from lab called stating the pt needs a blue sodium citrate tube for platelet count as there were platelet aggregates noted on the last platelet count. Samantha Guido informed that there is a specific order to add to have the test run that way.   Rossy

## 2020-09-23 ENCOUNTER — LAB ENCOUNTER (OUTPATIENT)
Dept: LAB | Age: 65
End: 2020-09-23
Attending: FAMILY MEDICINE
Payer: MEDICARE

## 2020-09-23 DIAGNOSIS — Z00.00 LABORATORY EXAM ORDERED AS PART OF ROUTINE GENERAL MEDICAL EXAMINATION: ICD-10-CM

## 2020-09-23 DIAGNOSIS — R73.01 IMPAIRED FASTING GLUCOSE: ICD-10-CM

## 2020-09-23 DIAGNOSIS — I10 ESSENTIAL HYPERTENSION: ICD-10-CM

## 2020-09-23 DIAGNOSIS — E03.9 ACQUIRED HYPOTHYROIDISM: ICD-10-CM

## 2020-09-23 DIAGNOSIS — Z00.00 ENCOUNTER FOR ANNUAL HEALTH EXAMINATION: ICD-10-CM

## 2020-09-23 DIAGNOSIS — D69.6 THROMBOCYTOPENIA (HCC): ICD-10-CM

## 2020-09-23 DIAGNOSIS — E78.5 HYPERLIPIDEMIA, UNSPECIFIED HYPERLIPIDEMIA TYPE: ICD-10-CM

## 2020-09-23 LAB
ALBUMIN SERPL-MCNC: 3.1 G/DL (ref 3.4–5)
ALBUMIN/GLOB SERPL: 0.8 {RATIO} (ref 1–2)
ALP LIVER SERPL-CCNC: 72 U/L
ALT SERPL-CCNC: 37 U/L
ANION GAP SERPL CALC-SCNC: 4 MMOL/L (ref 0–18)
AST SERPL-CCNC: 28 U/L (ref 15–37)
BASOPHILS # BLD AUTO: 0.04 X10(3) UL (ref 0–0.2)
BASOPHILS NFR BLD AUTO: 0.7 %
BILIRUB SERPL-MCNC: 0.2 MG/DL (ref 0.1–2)
BUN BLD-MCNC: 19 MG/DL (ref 7–18)
BUN/CREAT SERPL: 24.4 (ref 10–20)
CALCIUM BLD-MCNC: 8.8 MG/DL (ref 8.5–10.1)
CHLORIDE SERPL-SCNC: 109 MMOL/L (ref 98–112)
CHOLEST SMN-MCNC: 131 MG/DL (ref ?–200)
CO2 SERPL-SCNC: 29 MMOL/L (ref 21–32)
CREAT BLD-MCNC: 0.78 MG/DL
DEPRECATED RDW RBC AUTO: 51.7 FL (ref 35.1–46.3)
EOSINOPHIL # BLD AUTO: 0.23 X10(3) UL (ref 0–0.7)
EOSINOPHIL NFR BLD AUTO: 4.3 %
ERYTHROCYTE [DISTWIDTH] IN BLOOD BY AUTOMATED COUNT: 15.4 % (ref 11–15)
EST. AVERAGE GLUCOSE BLD GHB EST-MCNC: 117 MG/DL (ref 68–126)
GLOBULIN PLAS-MCNC: 4 G/DL (ref 2.8–4.4)
GLUCOSE BLD-MCNC: 98 MG/DL (ref 70–99)
HBA1C MFR BLD HPLC: 5.7 % (ref ?–5.7)
HCT VFR BLD AUTO: 42.9 %
HDLC SERPL-MCNC: 44 MG/DL (ref 40–59)
HGB BLD-MCNC: 12.6 G/DL
IMM GRANULOCYTES # BLD AUTO: 0.02 X10(3) UL (ref 0–1)
IMM GRANULOCYTES NFR BLD: 0.4 %
LDLC SERPL CALC-MCNC: 49 MG/DL (ref ?–100)
LYMPHOCYTES # BLD AUTO: 1.01 X10(3) UL (ref 1–4)
LYMPHOCYTES NFR BLD AUTO: 18.9 %
M PROTEIN MFR SERPL ELPH: 7.1 G/DL (ref 6.4–8.2)
MCH RBC QN AUTO: 27.2 PG (ref 26–34)
MCHC RBC AUTO-ENTMCNC: 29.4 G/DL (ref 31–37)
MCV RBC AUTO: 92.5 FL
MONOCYTES # BLD AUTO: 0.4 X10(3) UL (ref 0.1–1)
MONOCYTES NFR BLD AUTO: 7.5 %
NEUTROPHILS # BLD AUTO: 3.64 X10 (3) UL (ref 1.5–7.7)
NEUTROPHILS # BLD AUTO: 3.64 X10(3) UL (ref 1.5–7.7)
NEUTROPHILS NFR BLD AUTO: 68.2 %
NONHDLC SERPL-MCNC: 87 MG/DL (ref ?–130)
OSMOLALITY SERPL CALC.SUM OF ELEC: 296 MOSM/KG (ref 275–295)
PATIENT FASTING Y/N/NP: YES
PATIENT FASTING Y/N/NP: YES
PLATELET # BLD AUTO: 234.3 10(3)UL (ref 150–450)
PLATELET # BLD AUTO: 97 10(3)UL (ref 150–450)
POTASSIUM SERPL-SCNC: 3.9 MMOL/L (ref 3.5–5.1)
RBC # BLD AUTO: 4.64 X10(6)UL
SODIUM SERPL-SCNC: 142 MMOL/L (ref 136–145)
T4 FREE SERPL-MCNC: 1 NG/DL (ref 0.8–1.7)
TRIGL SERPL-MCNC: 191 MG/DL (ref 30–149)
TSI SER-ACNC: 4.44 MIU/ML (ref 0.36–3.74)
VLDLC SERPL CALC-MCNC: 38 MG/DL (ref 0–30)
WBC # BLD AUTO: 5.3 X10(3) UL (ref 4–11)

## 2020-09-23 PROCEDURE — 80053 COMPREHEN METABOLIC PANEL: CPT

## 2020-09-23 PROCEDURE — 36415 COLL VENOUS BLD VENIPUNCTURE: CPT

## 2020-09-23 PROCEDURE — 83036 HEMOGLOBIN GLYCOSYLATED A1C: CPT

## 2020-09-23 PROCEDURE — 84443 ASSAY THYROID STIM HORMONE: CPT

## 2020-09-23 PROCEDURE — 84439 ASSAY OF FREE THYROXINE: CPT

## 2020-09-23 PROCEDURE — 80061 LIPID PANEL: CPT

## 2020-09-23 PROCEDURE — 85025 COMPLETE CBC W/AUTO DIFF WBC: CPT

## 2020-09-27 PROBLEM — Q01.9 ENCEPHALOCELE (HCC): Status: ACTIVE | Noted: 2020-09-27

## 2020-09-27 PROBLEM — R89.8 PSEUDOTHROMBOCYTOPENIA: Status: ACTIVE | Noted: 2020-09-27

## 2020-09-27 PROBLEM — R73.03 PREDIABETES: Status: ACTIVE | Noted: 2020-09-27

## 2020-10-01 ENCOUNTER — HOSPITAL ENCOUNTER (OUTPATIENT)
Dept: BONE DENSITY | Age: 65
Discharge: HOME | End: 2020-10-01
Attending: FAMILY MEDICINE
Payer: MEDICARE

## 2020-10-01 DIAGNOSIS — Z78.0 POSTMENOPAUSAL: ICD-10-CM

## 2020-10-01 PROCEDURE — 77080 DXA BONE DENSITY AXIAL: CPT | Performed by: FAMILY MEDICINE

## 2020-10-05 ENCOUNTER — PATIENT MESSAGE (OUTPATIENT)
Dept: FAMILY MEDICINE CLINIC | Facility: CLINIC | Age: 65
End: 2020-10-05

## 2020-10-05 RX ORDER — LISINOPRIL 10 MG/1
10 TABLET ORAL DAILY
Qty: 90 TABLET | Refills: 1 | Status: SHIPPED | OUTPATIENT
Start: 2020-10-05 | End: 2021-04-07

## 2020-10-05 RX ORDER — HYDROCHLOROTHIAZIDE 12.5 MG/1
12.5 TABLET ORAL DAILY
Qty: 90 TABLET | Refills: 1 | Status: SHIPPED | OUTPATIENT
Start: 2020-10-05 | End: 2021-04-07

## 2020-10-05 NOTE — TELEPHONE ENCOUNTER
See message below. Ok per Dr. Red Nicholas to send RX to the  pharmacy. LOV: 9/8/2020  RTC: 6 months.

## 2020-10-05 NOTE — TELEPHONE ENCOUNTER
From: Nicole Reeves  To: Tarun Santos DO  Sent: 10/5/2020 11:08 AM CDT  Subject: Prescription Question    I just spoke with [facility]. They are still back ordered for my Rx of Lisinopril- HCTZ 10-12.5.  They do not know when they will get there maldonado

## 2020-10-09 DIAGNOSIS — E78.5 HYPERLIPIDEMIA, UNSPECIFIED HYPERLIPIDEMIA TYPE: ICD-10-CM

## 2020-10-09 RX ORDER — SIMVASTATIN 20 MG
TABLET ORAL
Qty: 90 TABLET | Refills: 1 | Status: SHIPPED | OUTPATIENT
Start: 2020-10-09 | End: 2021-04-08

## 2021-02-02 DIAGNOSIS — Z23 NEED FOR VACCINATION: ICD-10-CM

## 2021-02-06 ENCOUNTER — IMMUNIZATION (OUTPATIENT)
Dept: LAB | Age: 66
End: 2021-02-06
Attending: HOSPITALIST
Payer: MEDICARE

## 2021-02-06 DIAGNOSIS — Z23 NEED FOR VACCINATION: Primary | ICD-10-CM

## 2021-02-06 PROCEDURE — 0001A SARSCOV2 VAC 30MCG/0.3ML IM: CPT

## 2021-02-27 ENCOUNTER — IMMUNIZATION (OUTPATIENT)
Dept: LAB | Age: 66
End: 2021-02-27
Attending: HOSPITALIST
Payer: MEDICARE

## 2021-02-27 DIAGNOSIS — Z23 NEED FOR VACCINATION: Primary | ICD-10-CM

## 2021-02-27 PROCEDURE — 0002A SARSCOV2 VAC 30MCG/0.3ML IM: CPT

## 2021-03-03 ENCOUNTER — LAB ENCOUNTER (OUTPATIENT)
Dept: LAB | Age: 66
End: 2021-03-03
Attending: STUDENT IN AN ORGANIZED HEALTH CARE EDUCATION/TRAINING PROGRAM
Payer: MEDICARE

## 2021-03-03 DIAGNOSIS — Z01.818 PRE-OP TESTING: ICD-10-CM

## 2021-03-04 ENCOUNTER — ANESTHESIA EVENT (OUTPATIENT)
Dept: ENDOSCOPY | Facility: HOSPITAL | Age: 66
End: 2021-03-04
Payer: MEDICARE

## 2021-03-04 LAB — SARS-COV-2 RNA RESP QL NAA+PROBE: NOT DETECTED

## 2021-03-05 ENCOUNTER — ANESTHESIA (OUTPATIENT)
Dept: ENDOSCOPY | Facility: HOSPITAL | Age: 66
End: 2021-03-05
Payer: MEDICARE

## 2021-03-05 ENCOUNTER — HOSPITAL ENCOUNTER (OUTPATIENT)
Facility: HOSPITAL | Age: 66
Setting detail: HOSPITAL OUTPATIENT SURGERY
Discharge: HOME | End: 2021-03-05
Attending: INTERNAL MEDICINE | Admitting: INTERNAL MEDICINE
Payer: MEDICARE

## 2021-03-05 VITALS
WEIGHT: 261 LBS | DIASTOLIC BLOOD PRESSURE: 56 MMHG | RESPIRATION RATE: 20 BRPM | HEIGHT: 64.5 IN | OXYGEN SATURATION: 98 % | SYSTOLIC BLOOD PRESSURE: 103 MMHG | HEART RATE: 64 BPM | BODY MASS INDEX: 44.02 KG/M2 | TEMPERATURE: 98 F

## 2021-03-05 DIAGNOSIS — Z12.11 ENCOUNTER FOR SCREENING COLONOSCOPY: ICD-10-CM

## 2021-03-05 PROCEDURE — 0DJD8ZZ INSPECTION OF LOWER INTESTINAL TRACT, VIA NATURAL OR ARTIFICIAL OPENING ENDOSCOPIC: ICD-10-PCS | Performed by: INTERNAL MEDICINE

## 2021-03-05 RX ORDER — NALOXONE HYDROCHLORIDE 0.4 MG/ML
80 INJECTION, SOLUTION INTRAMUSCULAR; INTRAVENOUS; SUBCUTANEOUS AS NEEDED
Status: DISCONTINUED | OUTPATIENT
Start: 2021-03-05 | End: 2021-03-05

## 2021-03-05 RX ORDER — SODIUM CHLORIDE, SODIUM LACTATE, POTASSIUM CHLORIDE, CALCIUM CHLORIDE 600; 310; 30; 20 MG/100ML; MG/100ML; MG/100ML; MG/100ML
INJECTION, SOLUTION INTRAVENOUS CONTINUOUS
Status: DISCONTINUED | OUTPATIENT
Start: 2021-03-05 | End: 2021-03-05

## 2021-03-05 RX ORDER — HYDROMORPHONE HYDROCHLORIDE 1 MG/ML
0.4 INJECTION, SOLUTION INTRAMUSCULAR; INTRAVENOUS; SUBCUTANEOUS EVERY 5 MIN PRN
Status: DISCONTINUED | OUTPATIENT
Start: 2021-03-05 | End: 2021-03-05

## 2021-03-05 RX ORDER — LIDOCAINE HYDROCHLORIDE 10 MG/ML
INJECTION, SOLUTION EPIDURAL; INFILTRATION; INTRACAUDAL; PERINEURAL AS NEEDED
Status: DISCONTINUED | OUTPATIENT
Start: 2021-03-05 | End: 2021-03-05 | Stop reason: SURG

## 2021-03-05 RX ADMIN — SODIUM CHLORIDE, SODIUM LACTATE, POTASSIUM CHLORIDE, CALCIUM CHLORIDE: 600; 310; 30; 20 INJECTION, SOLUTION INTRAVENOUS at 13:08:00

## 2021-03-05 RX ADMIN — SODIUM CHLORIDE, SODIUM LACTATE, POTASSIUM CHLORIDE, CALCIUM CHLORIDE: 600; 310; 30; 20 INJECTION, SOLUTION INTRAVENOUS at 12:40:00

## 2021-03-05 RX ADMIN — LIDOCAINE HYDROCHLORIDE 25 MG: 10 INJECTION, SOLUTION EPIDURAL; INFILTRATION; INTRACAUDAL; PERINEURAL at 12:41:00

## 2021-03-05 NOTE — ANESTHESIA PREPROCEDURE EVALUATION
PRE-OP EVALUATION    Patient Name: Luke Wagner    Pre-op Diagnosis: Encounter for screening colonoscopy [Z12.11]    Procedure(s):  COLONOSCOPY    Surgeon(s) and Role:     Tin Osborne MD - Primary    Pre-op vitals reviewed.   Temp: 97.5 °F (3 units Oral Cap, Take 5,000 Units by mouth daily. , Disp: , Rfl: , 3/4/2021 at Unknown time    •  aspirin 81 MG Oral Tab, Take 81 mg by mouth daily. , Disp: , Rfl: , 3/4/2021 at Unknown time    •  Desloratadine (CLARINEX OR), Take by mouth., Disp: , Rfl: month      Drug use: No     Available pre-op labs reviewed. Airway      Mallampati: III  Mouth opening: >3 FB  TM distance: > 6 cm  Neck ROM: full Cardiovascular    Cardiovascular exam normal.         Dental    No notable dental history.

## 2021-03-05 NOTE — OPERATIVE REPORT
Operative Report-Colonoscopy    PREOPERATIVE DIAGNOSIS/INDICATION: Screening colonoscopy     POSTOPERTATIVE DIAGNOSIS: Internal hemorrhoids     PROCEDURE PERFORMED: COLONOSCOPY    INFORMED CONSENT:  Once a b

## 2021-03-05 NOTE — H&P
[address] Patient Status:  Hospital Outpatient Surgery    1955 MRN [id number]   [facility] ENDOSCOPY Attending Prabhu Lee MD   Hosp Day # 0 PCP DO Tiffany Orosco 2002   • GASTRIC BYPASS,OBESITY,SB RECONSTRUC  12/2002   • HERNIA SURGERY  2011   • HYSTERECTOMY  02/2017    Robotic hysterectomy/BSO/staging/vaginal brachytherapy; seen by Dr. Destini Ratliff   • OTHER SURGICAL HISTORY  06/2020    Cerebral spinal fluid leak/ repai MCG/ACT Nasal Suspension, 2 sprays by Nasal route as needed. , Disp: , Rfl: 2  •  Cholecalciferol (VITAMIN D3) 17130 units Oral Cap, Take 5,000 Units by mouth daily. , Disp: , Rfl:   •  aspirin 81 MG Oral Tab, Take 81 mg by mouth daily. , Disp: , Rfl:   • periods, heavy menstrual periods. Patient is not pregnant.   Psychosocial: Denies history of mental illness, denies usually feeling lonely or depressed, denies history of depression, anxiety, history of physical or sexual abuse, stress, history of eating d understanding of these issues and agrees to proceed with the scheduled procedure.    Pablito Hearn  3/5/2021  8:06 AM

## 2021-03-05 NOTE — ANESTHESIA POSTPROCEDURE EVALUATION
[address] Patient Status:  Hospital Outpatient Surgery   Age/Gender 74year old female MRN [id number]   Location [facility]. Attending Amelia Patel MD   Hosp Day # 0 PCP Elia Awad, DO       Anesthesia Po

## 2021-03-08 ENCOUNTER — PATIENT MESSAGE (OUTPATIENT)
Dept: FAMILY MEDICINE CLINIC | Facility: CLINIC | Age: 66
End: 2021-03-08

## 2021-03-08 DIAGNOSIS — Z12.31 ENCOUNTER FOR SCREENING MAMMOGRAM FOR BREAST CANCER: Primary | ICD-10-CM

## 2021-04-07 DIAGNOSIS — E78.5 HYPERLIPIDEMIA, UNSPECIFIED HYPERLIPIDEMIA TYPE: ICD-10-CM

## 2021-04-07 NOTE — TELEPHONE ENCOUNTER
hydrochlorothiazide 12.5 MG Oral Tab          Sig: Take 1 tablet (12.5 mg total) by mouth daily.     Disp:  90 tablet    Refills:  1    Start: 4/7/2021 - 4/2/2022    Class: Normal    Non-formulary    Last ordered: 6 months ago by DO Aubrey Thakur

## 2021-04-08 RX ORDER — LISINOPRIL 10 MG/1
10 TABLET ORAL DAILY
Qty: 90 TABLET | Refills: 0 | Status: SHIPPED | OUTPATIENT
Start: 2021-04-08 | End: 2021-06-28

## 2021-04-08 RX ORDER — HYDROCHLOROTHIAZIDE 12.5 MG/1
12.5 TABLET ORAL DAILY
Qty: 90 TABLET | Refills: 0 | Status: SHIPPED | OUTPATIENT
Start: 2021-04-08 | End: 2021-06-28

## 2021-04-08 RX ORDER — SIMVASTATIN 20 MG
20 TABLET ORAL DAILY
Qty: 90 TABLET | Refills: 0 | Status: SHIPPED | OUTPATIENT
Start: 2021-04-08 | End: 2021-07-14

## 2021-05-08 ENCOUNTER — HOSPITAL ENCOUNTER (OUTPATIENT)
Dept: MAMMOGRAPHY | Age: 66
Discharge: HOME | End: 2021-05-08
Attending: FAMILY MEDICINE
Payer: MEDICARE

## 2021-05-08 DIAGNOSIS — Z12.31 ENCOUNTER FOR SCREENING MAMMOGRAM FOR BREAST CANCER: ICD-10-CM

## 2021-05-08 PROCEDURE — 77067 SCR MAMMO BI INCL CAD: CPT | Performed by: FAMILY MEDICINE

## 2021-05-08 PROCEDURE — 77063 BREAST TOMOSYNTHESIS BI: CPT | Performed by: FAMILY MEDICINE

## 2021-06-29 RX ORDER — LISINOPRIL 10 MG/1
10 TABLET ORAL DAILY
Qty: 90 TABLET | Refills: 0 | Status: SHIPPED | OUTPATIENT
Start: 2021-06-29 | End: 2021-09-09

## 2021-06-29 RX ORDER — HYDROCHLOROTHIAZIDE 12.5 MG/1
12.5 TABLET ORAL DAILY
Qty: 90 TABLET | Refills: 0 | Status: SHIPPED | OUTPATIENT
Start: 2021-06-29 | End: 2021-09-09

## 2021-06-29 NOTE — TELEPHONE ENCOUNTER
Requested Prescriptions     lisinopril 10 MG Oral Tab         Sig: Take 1 tablet (10 mg total) by mouth daily.     Disp:  90 tablet    Refills:  0    Start: 6/28/2021    Class: Normal    Non-formulary    Last ordered: 2 months ago by Maria Elena Valentino DO

## 2021-07-14 DIAGNOSIS — Z00.00 LABORATORY EXAM ORDERED AS PART OF ROUTINE GENERAL MEDICAL EXAMINATION: Primary | ICD-10-CM

## 2021-07-14 DIAGNOSIS — E78.5 HYPERLIPIDEMIA, UNSPECIFIED HYPERLIPIDEMIA TYPE: ICD-10-CM

## 2021-07-14 DIAGNOSIS — R73.03 PREDIABETES: ICD-10-CM

## 2021-07-14 NOTE — TELEPHONE ENCOUNTER
simvastatin 20 MG Oral Tab         Sig: Take 1 tablet (20 mg total) by mouth daily.     Disp:  90 tablet    Refills:  0    Start: 7/14/2021    Class: Normal    Non-formulary For: Hyperlipidemia, unspecified hyperlipidemia type    Last ordered: 3 months ago

## 2021-07-15 RX ORDER — SIMVASTATIN 20 MG
20 TABLET ORAL DAILY
Qty: 90 TABLET | Refills: 0 | Status: SHIPPED | OUTPATIENT
Start: 2021-07-15 | End: 2021-09-29

## 2021-09-01 ENCOUNTER — TELEPHONE (OUTPATIENT)
Dept: FAMILY MEDICINE CLINIC | Facility: CLINIC | Age: 66
End: 2021-09-01

## 2021-09-01 NOTE — TELEPHONE ENCOUNTER
Future Appointments   Date Time Provider Ethan Slaughter   9/7/2021  7:30 AM [facility]   9/9/2021  9:00 AM Rubi Brown,  [facility] [address] [facility] [address]     Lab appt has been scheduled prior to appt.

## 2021-09-07 ENCOUNTER — LABORATORY ENCOUNTER (OUTPATIENT)
Dept: LAB | Age: 66
End: 2021-09-07
Attending: FAMILY MEDICINE
Payer: MEDICARE

## 2021-09-07 DIAGNOSIS — E78.5 HYPERLIPIDEMIA, UNSPECIFIED HYPERLIPIDEMIA TYPE: ICD-10-CM

## 2021-09-07 DIAGNOSIS — R73.03 PREDIABETES: ICD-10-CM

## 2021-09-07 DIAGNOSIS — Z00.00 LABORATORY EXAM ORDERED AS PART OF ROUTINE GENERAL MEDICAL EXAMINATION: ICD-10-CM

## 2021-09-07 LAB
ALBUMIN SERPL-MCNC: 3.1 G/DL (ref 3.4–5)
ALBUMIN/GLOB SERPL: 0.7 {RATIO} (ref 1–2)
ALP LIVER SERPL-CCNC: 69 U/L
ALT SERPL-CCNC: 30 U/L
ANION GAP SERPL CALC-SCNC: 12 MMOL/L (ref 0–18)
AST SERPL-CCNC: 22 U/L (ref 15–37)
BASOPHILS # BLD AUTO: 0.04 X10(3) UL (ref 0–0.2)
BASOPHILS NFR BLD AUTO: 0.9 %
BILIRUB SERPL-MCNC: 0.3 MG/DL (ref 0.1–2)
BUN BLD-MCNC: 17 MG/DL (ref 7–18)
CALCIUM BLD-MCNC: 9.4 MG/DL (ref 8.5–10.1)
CHLORIDE SERPL-SCNC: 107 MMOL/L (ref 98–112)
CHOLEST SMN-MCNC: 125 MG/DL (ref ?–200)
CO2 SERPL-SCNC: 24 MMOL/L (ref 21–32)
CREAT BLD-MCNC: 0.62 MG/DL
EOSINOPHIL # BLD AUTO: 0.18 X10(3) UL (ref 0–0.7)
EOSINOPHIL NFR BLD AUTO: 3.8 %
ERYTHROCYTE [DISTWIDTH] IN BLOOD BY AUTOMATED COUNT: 14.8 %
EST. AVERAGE GLUCOSE BLD GHB EST-MCNC: 120 MG/DL (ref 68–126)
GLOBULIN PLAS-MCNC: 4.4 G/DL (ref 2.8–4.4)
GLUCOSE BLD-MCNC: 72 MG/DL (ref 70–99)
HBA1C MFR BLD HPLC: 5.8 % (ref ?–5.7)
HCT VFR BLD AUTO: 42.5 %
HDLC SERPL-MCNC: 43 MG/DL (ref 40–59)
HGB BLD-MCNC: 13.2 G/DL
IMM GRANULOCYTES # BLD AUTO: 0.01 X10(3) UL (ref 0–1)
IMM GRANULOCYTES NFR BLD: 0.2 %
LDLC SERPL CALC-MCNC: 57 MG/DL (ref ?–100)
LYMPHOCYTES # BLD AUTO: 1.1 X10(3) UL (ref 1–4)
LYMPHOCYTES NFR BLD AUTO: 23.5 %
M PROTEIN MFR SERPL ELPH: 7.5 G/DL (ref 6.4–8.2)
MCH RBC QN AUTO: 28.6 PG (ref 26–34)
MCHC RBC AUTO-ENTMCNC: 31.1 G/DL (ref 31–37)
MCV RBC AUTO: 92 FL
MONOCYTES # BLD AUTO: 0.48 X10(3) UL (ref 0.1–1)
MONOCYTES NFR BLD AUTO: 10.2 %
NEUTROPHILS # BLD AUTO: 2.88 X10 (3) UL (ref 1.5–7.7)
NEUTROPHILS # BLD AUTO: 2.88 X10(3) UL (ref 1.5–7.7)
NEUTROPHILS NFR BLD AUTO: 61.4 %
NONHDLC SERPL-MCNC: 82 MG/DL (ref ?–130)
OSMOLALITY SERPL CALC.SUM OF ELEC: 296 MOSM/KG (ref 275–295)
PATIENT FASTING Y/N/NP: YES
PATIENT FASTING Y/N/NP: YES
POTASSIUM SERPL-SCNC: 3.6 MMOL/L (ref 3.5–5.1)
RBC # BLD AUTO: 4.62 X10(6)UL
SODIUM SERPL-SCNC: 143 MMOL/L (ref 136–145)
TRIGL SERPL-MCNC: 146 MG/DL (ref 30–149)
TSI SER-ACNC: 2.63 MIU/ML (ref 0.36–3.74)
VLDLC SERPL CALC-MCNC: 21 MG/DL (ref 0–30)
WBC # BLD AUTO: 4.7 X10(3) UL (ref 4–11)

## 2021-09-07 PROCEDURE — 83036 HEMOGLOBIN GLYCOSYLATED A1C: CPT

## 2021-09-07 PROCEDURE — 80061 LIPID PANEL: CPT

## 2021-09-07 PROCEDURE — 85025 COMPLETE CBC W/AUTO DIFF WBC: CPT

## 2021-09-07 PROCEDURE — 80053 COMPREHEN METABOLIC PANEL: CPT

## 2021-09-07 PROCEDURE — 36415 COLL VENOUS BLD VENIPUNCTURE: CPT

## 2021-09-07 PROCEDURE — 84443 ASSAY THYROID STIM HORMONE: CPT

## 2021-09-09 ENCOUNTER — OFFICE VISIT (OUTPATIENT)
Dept: FAMILY MEDICINE CLINIC | Facility: CLINIC | Age: 66
End: 2021-09-09
Payer: MEDICARE

## 2021-09-09 VITALS
DIASTOLIC BLOOD PRESSURE: 70 MMHG | WEIGHT: 263.25 LBS | HEART RATE: 77 BPM | HEIGHT: 64.5 IN | TEMPERATURE: 97 F | BODY MASS INDEX: 44.4 KG/M2 | SYSTOLIC BLOOD PRESSURE: 110 MMHG | OXYGEN SATURATION: 99 % | RESPIRATION RATE: 16 BRPM

## 2021-09-09 DIAGNOSIS — E78.5 HYPERLIPIDEMIA, UNSPECIFIED HYPERLIPIDEMIA TYPE: ICD-10-CM

## 2021-09-09 DIAGNOSIS — Z00.00 ENCOUNTER FOR ANNUAL HEALTH EXAMINATION: Primary | ICD-10-CM

## 2021-09-09 DIAGNOSIS — I10 ESSENTIAL HYPERTENSION: ICD-10-CM

## 2021-09-09 DIAGNOSIS — E03.9 ACQUIRED HYPOTHYROIDISM: ICD-10-CM

## 2021-09-09 DIAGNOSIS — R73.03 PREDIABETES: ICD-10-CM

## 2021-09-09 DIAGNOSIS — F33.41 RECURRENT MAJOR DEPRESSIVE DISORDER, IN PARTIAL REMISSION (HCC): ICD-10-CM

## 2021-09-09 PROCEDURE — G0439 PPPS, SUBSEQ VISIT: HCPCS | Performed by: FAMILY MEDICINE

## 2021-09-09 RX ORDER — VENLAFAXINE HYDROCHLORIDE 75 MG/1
75 CAPSULE, EXTENDED RELEASE ORAL DAILY
Qty: 90 CAPSULE | Refills: 3 | Status: SHIPPED | OUTPATIENT
Start: 2021-09-09

## 2021-09-09 RX ORDER — LISINOPRIL 10 MG/1
10 TABLET ORAL DAILY
Qty: 90 TABLET | Refills: 1 | Status: SHIPPED | OUTPATIENT
Start: 2021-09-09 | End: 2022-01-27

## 2021-09-09 RX ORDER — HYDROCHLOROTHIAZIDE 12.5 MG/1
12.5 TABLET ORAL DAILY
Qty: 90 TABLET | Refills: 1 | Status: SHIPPED | OUTPATIENT
Start: 2021-09-09 | End: 2022-01-27

## 2021-09-09 RX ORDER — LEVOTHYROXINE SODIUM 150 UG/1
1 CAPSULE ORAL EVERY MORNING
Qty: 90 CAPSULE | Refills: 3 | Status: SHIPPED | OUTPATIENT
Start: 2021-09-09

## 2021-09-09 NOTE — PROGRESS NOTES
HPI:   Evon Mejia is a 74year old female who presents for a Medicare Subsequent Annual Wellness visit (Pt already had Initial Annual Wellness). Doing well. Lives with , helps watch her young grand kids.   Has small pool at her house, mor Years: 0.00        Pack years: 0        Types: Cigarettes        Quit date: 2000        Years since quittin.7      Smokeless tobacco: Never Used      Tobacco comment: Smoked very rarely a pack would last more than a month.          CAGE screening s Tab, Take 1 tablet (12.5 mg total) by mouth daily. venlafaxine 75 MG Oral Capsule SR 24 Hr, Take 1 capsule (75 mg total) by mouth daily. Levothyroxine Sodium 150 MCG Oral Cap, Take 1 tablet by mouth every morning.   simvastatin 20 MG Oral Tab, Take 1 tabl Heart Disease in her father and mother; Other in her mother and sister; brain tumor in her brother; ms in her brother. SOCIAL HISTORY:   She  reports that she quit smoking about 21 years ago. Her smoking use included cigarettes.  She smoked 0.00 packs per tongue normal; teeth and gums normal   Neck: Supple, symmetrical, trachea midline, no adenopathy;  thyroid: not enlarged, symmetric, no tenderness/mass/nodules; no carotid bruit or JVD   Back:   Symmetric, no curvature, ROM normal, no CVA tenderness   Lung Sodium 150 MCG Oral Cap; Take 1 tablet by mouth every morning. Essential hypertension  -     lisinopril 10 MG Oral Tab; Take 1 tablet (10 mg total) by mouth daily. -     hydrochlorothiazide 12.5 MG Oral Tab;  Take 1 tablet (12.5 mg total) by mouth daily 146 09/07/2021         Electrocardiogram (EKG)   Covered if needed at Welcome to Medicare, and non-screening if indicated for medical reasons 08/26/2020      Ultrasound Screening for Abdominal Aortic Aneurysm (AAA) Covered once in a lifetime for one of the may be covered with your pharmacy prescription benefits -    Tetanus, Diptheria and Pertusis TD and TDaP Not covered by Medicare Part B -  No recommendations at this time    Zoster Not covered by Medicare Part B; may be covered with your pharmacy  prescrip

## 2021-09-09 NOTE — PATIENT INSTRUCTIONS
Florinda [place]'s SCREENING SCHEDULE   Tests on this list are recommended by your physician but may not be covered, or covered at this frequency, by your insurer. Please check with your insurance carrier before scheduling to verify coverage.    PREVEN 10/01/2020      No recommendations at this time   Pap and Pelvic    Pap   Covered every 2 years for women at normal risk;  Annually if at high risk -  No recommendations at this time    Chlamydia Annually if high risk -  No recommendations at this time   Sc http://www. [place]ph.state. [place]/public/books/advin.htm  A link to the [employer] It [employer]. This site has a lot of good information including definitions of the different types of Advance Directives.  It also has the State forms available on it's webs

## 2021-09-13 DIAGNOSIS — E03.9 ACQUIRED HYPOTHYROIDISM: ICD-10-CM

## 2021-09-13 RX ORDER — LEVOTHYROXINE SODIUM 150 UG/1
1 CAPSULE ORAL EVERY MORNING
Qty: 90 CAPSULE | Refills: 3 | OUTPATIENT
Start: 2021-09-13

## 2021-09-13 NOTE — TELEPHONE ENCOUNTER
Levothyroxine Sodium 150 MCG Oral Cap          Sig: Take 1 tablet by mouth every morning.     Disp:  90 capsule    Refills:  3    Start: 9/13/2021    Class: Normal    Non-formulary For: Acquired hypothyroidism    Last ordered: 4 days ago by Oksana Reeves,

## 2021-09-16 ENCOUNTER — TELEPHONE (OUTPATIENT)
Dept: FAMILY MEDICINE CLINIC | Facility: CLINIC | Age: 66
End: 2021-09-16

## 2021-09-16 NOTE — TELEPHONE ENCOUNTER
Spoke with Izabela Marcano at [facility] [address], tablets is covered by insurance, verbal given to change Rx to tablets.

## 2021-09-16 NOTE — TELEPHONE ENCOUNTER
They received a RX for Levo 150 mcg caps, her history shows tablets, do you want to change it or is that what was meant to be ordered? Please advise.

## 2021-09-29 DIAGNOSIS — E78.5 HYPERLIPIDEMIA, UNSPECIFIED HYPERLIPIDEMIA TYPE: ICD-10-CM

## 2021-09-29 RX ORDER — SIMVASTATIN 20 MG
20 TABLET ORAL DAILY
Qty: 90 TABLET | Refills: 0 | Status: SHIPPED | OUTPATIENT
Start: 2021-09-29 | End: 2022-01-11

## 2021-09-29 NOTE — TELEPHONE ENCOUNTER
Fax for prescription renewal for Simvastatin 20mg tablets.  Qty: 90
simvastatin 20 MG Oral Tab         Sig: Take 1 tablet (20 mg total) by mouth daily.     Disp:  90 tablet    Refills:  0    Start: 9/29/2021    Class: Normal    Non-formulary For: Hyperlipidemia, unspecified hyperlipidemia type    Last ordered: 2 months ago
radiology results

## 2021-10-21 NOTE — PROGRESS NOTES
[facility] Radiation Treatment Management Note 1-5    Patient:  Chente Castillo  Age:  57year old  Visit Diagnosis:    1.  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)      Primary Rad/Onc:  Dr. Anthony Carlos Not applicable as gestational age is greater than or equal to 34 weeks.

## 2022-01-11 DIAGNOSIS — E78.5 HYPERLIPIDEMIA, UNSPECIFIED HYPERLIPIDEMIA TYPE: ICD-10-CM

## 2022-01-11 RX ORDER — SIMVASTATIN 20 MG
20 TABLET ORAL DAILY
Qty: 90 TABLET | Refills: 0 | Status: CANCELLED | OUTPATIENT
Start: 2022-01-11

## 2022-01-11 RX ORDER — SIMVASTATIN 20 MG
20 TABLET ORAL DAILY
Qty: 90 TABLET | Refills: 1 | Status: SHIPPED | OUTPATIENT
Start: 2022-01-11

## 2022-01-11 NOTE — TELEPHONE ENCOUNTER
Cholesterol Medication Protocol Passed 01/11/2022 07:08 AM   Protocol Details  ALT < 80    ALT resulted within past year    Lipid panel within past 12 months    Appointment within past 12 or next 3 months     Refill protocol passed because the patient met

## 2022-01-27 DIAGNOSIS — I10 ESSENTIAL HYPERTENSION: ICD-10-CM

## 2022-01-27 RX ORDER — LISINOPRIL 10 MG/1
10 TABLET ORAL DAILY
Qty: 90 TABLET | Refills: 0 | Status: SHIPPED | OUTPATIENT
Start: 2022-01-27

## 2022-01-27 RX ORDER — HYDROCHLOROTHIAZIDE 12.5 MG/1
12.5 TABLET ORAL DAILY
Qty: 90 TABLET | Refills: 0 | Status: SHIPPED | OUTPATIENT
Start: 2022-01-27

## 2022-01-27 NOTE — TELEPHONE ENCOUNTER
Requested Prescriptions     lisinopril 10 MG Oral Tab         Sig: Take 1 tablet (10 mg total) by mouth daily.     Disp:  90 tablet    Refills:  1    Start: 1/27/2022    Class: Normal    Non-formulary For: Essential hypertension    Last ordered: 4 months ag

## 2022-02-18 ENCOUNTER — LAB ENCOUNTER (OUTPATIENT)
Dept: LAB | Age: 67
End: 2022-02-18
Attending: FAMILY MEDICINE
Payer: MEDICARE

## 2022-02-18 DIAGNOSIS — E78.5 HYPERLIPIDEMIA, UNSPECIFIED HYPERLIPIDEMIA TYPE: ICD-10-CM

## 2022-02-18 DIAGNOSIS — I10 ESSENTIAL HYPERTENSION: ICD-10-CM

## 2022-02-18 DIAGNOSIS — R73.03 PREDIABETES: ICD-10-CM

## 2022-02-18 LAB
ALBUMIN SERPL-MCNC: 3.4 G/DL (ref 3.4–5)
ALBUMIN/GLOB SERPL: 1 {RATIO} (ref 1–2)
ALP LIVER SERPL-CCNC: 63 U/L
ALT SERPL-CCNC: 28 U/L
ANION GAP SERPL CALC-SCNC: 3 MMOL/L (ref 0–18)
AST SERPL-CCNC: 21 U/L (ref 15–37)
BILIRUB SERPL-MCNC: 0.3 MG/DL (ref 0.1–2)
BUN BLD-MCNC: 21 MG/DL (ref 7–18)
CALCIUM BLD-MCNC: 9.1 MG/DL (ref 8.5–10.1)
CHLORIDE SERPL-SCNC: 107 MMOL/L (ref 98–112)
CHOLEST SERPL-MCNC: 132 MG/DL (ref ?–200)
CO2 SERPL-SCNC: 29 MMOL/L (ref 21–32)
CREAT BLD-MCNC: 0.65 MG/DL
EST. AVERAGE GLUCOSE BLD GHB EST-MCNC: 114 MG/DL (ref 68–126)
FASTING PATIENT LIPID ANSWER: YES
FASTING STATUS PATIENT QL REPORTED: YES
GLOBULIN PLAS-MCNC: 3.5 G/DL (ref 2.8–4.4)
GLUCOSE BLD-MCNC: 102 MG/DL (ref 70–99)
HBA1C MFR BLD: 5.6 % (ref ?–5.7)
HDLC SERPL-MCNC: 45 MG/DL (ref 40–59)
LDLC SERPL CALC-MCNC: 55 MG/DL (ref ?–100)
NONHDLC SERPL-MCNC: 87 MG/DL (ref ?–130)
OSMOLALITY SERPL CALC.SUM OF ELEC: 291 MOSM/KG (ref 275–295)
POTASSIUM SERPL-SCNC: 3.9 MMOL/L (ref 3.5–5.1)
PROT SERPL-MCNC: 6.9 G/DL (ref 6.4–8.2)
SODIUM SERPL-SCNC: 139 MMOL/L (ref 136–145)
TRIGL SERPL-MCNC: 198 MG/DL (ref 30–149)
VLDLC SERPL CALC-MCNC: 29 MG/DL (ref 0–30)

## 2022-02-18 PROCEDURE — 80053 COMPREHEN METABOLIC PANEL: CPT

## 2022-02-18 PROCEDURE — 80061 LIPID PANEL: CPT

## 2022-02-18 PROCEDURE — 83036 HEMOGLOBIN GLYCOSYLATED A1C: CPT

## 2022-02-18 PROCEDURE — 36415 COLL VENOUS BLD VENIPUNCTURE: CPT

## 2022-03-14 ENCOUNTER — HOSPITAL ENCOUNTER (INPATIENT)
Facility: HOSPITAL | Age: 67
LOS: 3 days | Discharge: HOME | DRG: 558 | End: 2022-03-17
Attending: EMERGENCY MEDICINE | Admitting: HOSPITALIST
Payer: MEDICARE

## 2022-03-14 DIAGNOSIS — M65.9 FLEXOR TENOSYNOVITIS OF FINGER: Primary | ICD-10-CM

## 2022-03-14 PROBLEM — M65.949 FLEXOR TENOSYNOVITIS OF FINGER: Status: ACTIVE | Noted: 2022-03-14

## 2022-03-14 LAB
ALBUMIN SERPL-MCNC: 3.2 G/DL (ref 3.4–5)
ALBUMIN/GLOB SERPL: 0.7 {RATIO} (ref 1–2)
ALP LIVER SERPL-CCNC: 72 U/L
ALT SERPL-CCNC: 27 U/L
ANION GAP SERPL CALC-SCNC: 7 MMOL/L (ref 0–18)
AST SERPL-CCNC: 28 U/L (ref 15–37)
BASOPHILS # BLD AUTO: 0.04 X10(3) UL (ref 0–0.2)
BASOPHILS NFR BLD AUTO: 0.4 %
BILIRUB SERPL-MCNC: 0.4 MG/DL (ref 0.1–2)
BUN BLD-MCNC: 13 MG/DL (ref 7–18)
CALCIUM BLD-MCNC: 9.1 MG/DL (ref 8.5–10.1)
CHLORIDE SERPL-SCNC: 104 MMOL/L (ref 98–112)
CO2 SERPL-SCNC: 26 MMOL/L (ref 21–32)
CREAT BLD-MCNC: 0.67 MG/DL
EOSINOPHIL # BLD AUTO: 0.12 X10(3) UL (ref 0–0.7)
EOSINOPHIL NFR BLD AUTO: 1.2 %
ERYTHROCYTE [DISTWIDTH] IN BLOOD BY AUTOMATED COUNT: 14.5 %
GLOBULIN PLAS-MCNC: 4.3 G/DL (ref 2.8–4.4)
GLUCOSE BLD-MCNC: 125 MG/DL (ref 70–99)
HCT VFR BLD AUTO: 39.6 %
HGB BLD-MCNC: 12.8 G/DL
IMM GRANULOCYTES # BLD AUTO: 0.04 X10(3) UL (ref 0–1)
IMM GRANULOCYTES NFR BLD: 0.4 %
LYMPHOCYTES # BLD AUTO: 0.83 X10(3) UL (ref 1–4)
LYMPHOCYTES NFR BLD AUTO: 8.4 %
MCHC RBC AUTO-ENTMCNC: 32.3 G/DL (ref 31–37)
MCV RBC AUTO: 89 FL
MONOCYTES # BLD AUTO: 0.74 X10(3) UL (ref 0.1–1)
MONOCYTES NFR BLD AUTO: 7.5 %
NEUTROPHILS # BLD AUTO: 8.14 X10 (3) UL (ref 1.5–7.7)
NEUTROPHILS # BLD AUTO: 8.14 X10(3) UL (ref 1.5–7.7)
NEUTROPHILS NFR BLD AUTO: 82.1 %
OSMOLALITY SERPL CALC.SUM OF ELEC: 286 MOSM/KG (ref 275–295)
POTASSIUM SERPL-SCNC: 4.1 MMOL/L (ref 3.5–5.1)
PROT SERPL-MCNC: 7.5 G/DL (ref 6.4–8.2)
RBC # BLD AUTO: 4.45 X10(6)UL
SARS-COV-2 RNA RESP QL NAA+PROBE: NOT DETECTED
SODIUM SERPL-SCNC: 137 MMOL/L (ref 136–145)
WBC # BLD AUTO: 9.9 X10(3) UL (ref 4–11)

## 2022-03-14 PROCEDURE — 99222 1ST HOSP IP/OBS MODERATE 55: CPT | Performed by: ORTHOPAEDIC SURGERY

## 2022-03-14 PROCEDURE — 99223 1ST HOSP IP/OBS HIGH 75: CPT | Performed by: INTERNAL MEDICINE

## 2022-03-14 RX ORDER — KETOROLAC TROMETHAMINE 15 MG/ML
15 INJECTION, SOLUTION INTRAMUSCULAR; INTRAVENOUS EVERY 6 HOURS
Status: COMPLETED | OUTPATIENT
Start: 2022-03-14 | End: 2022-03-15

## 2022-03-14 RX ORDER — ACETAMINOPHEN AND CODEINE PHOSPHATE 300; 30 MG/1; MG/1
2 TABLET ORAL EVERY 4 HOURS PRN
Status: DISCONTINUED | OUTPATIENT
Start: 2022-03-14 | End: 2022-03-17

## 2022-03-14 RX ORDER — HYDROCHLOROTHIAZIDE 25 MG/1
12.5 TABLET ORAL DAILY
Status: DISCONTINUED | OUTPATIENT
Start: 2022-03-15 | End: 2022-03-17

## 2022-03-14 RX ORDER — SODIUM CHLORIDE 9 MG/ML
INJECTION, SOLUTION INTRAVENOUS CONTINUOUS
Status: ACTIVE | OUTPATIENT
Start: 2022-03-14 | End: 2022-03-14

## 2022-03-14 RX ORDER — SODIUM CHLORIDE 9 MG/ML
INJECTION, SOLUTION INTRAVENOUS CONTINUOUS
Status: ACTIVE | OUTPATIENT
Start: 2022-03-14 | End: 2022-03-15

## 2022-03-14 RX ORDER — ATORVASTATIN CALCIUM 10 MG/1
10 TABLET, FILM COATED ORAL NIGHTLY
Refills: 1 | Status: DISCONTINUED | OUTPATIENT
Start: 2022-03-14 | End: 2022-03-17

## 2022-03-14 RX ORDER — MORPHINE SULFATE 4 MG/ML
4 INJECTION, SOLUTION INTRAMUSCULAR; INTRAVENOUS EVERY 30 MIN PRN
Status: ACTIVE | OUTPATIENT
Start: 2022-03-14 | End: 2022-03-14

## 2022-03-14 RX ORDER — MORPHINE SULFATE 4 MG/ML
4 INJECTION, SOLUTION INTRAMUSCULAR; INTRAVENOUS ONCE
Status: COMPLETED | OUTPATIENT
Start: 2022-03-14 | End: 2022-03-14

## 2022-03-14 RX ORDER — LETROZOLE 2.5 MG/1
2.5 TABLET, FILM COATED ORAL DAILY
Status: DISCONTINUED | OUTPATIENT
Start: 2022-03-15 | End: 2022-03-17

## 2022-03-14 RX ORDER — LISINOPRIL 10 MG/1
10 TABLET ORAL DAILY
Status: DISCONTINUED | OUTPATIENT
Start: 2022-03-15 | End: 2022-03-17

## 2022-03-14 RX ORDER — ACETAMINOPHEN 325 MG/1
650 TABLET ORAL EVERY 6 HOURS PRN
Status: DISCONTINUED | OUTPATIENT
Start: 2022-03-14 | End: 2022-03-17

## 2022-03-14 RX ORDER — ASPIRIN 81 MG/1
81 TABLET ORAL DAILY
Status: DISCONTINUED | OUTPATIENT
Start: 2022-03-15 | End: 2022-03-17

## 2022-03-14 RX ORDER — ENOXAPARIN SODIUM 100 MG/ML
0.5 INJECTION SUBCUTANEOUS NIGHTLY
Status: DISCONTINUED | OUTPATIENT
Start: 2022-03-14 | End: 2022-03-17

## 2022-03-14 RX ORDER — VENLAFAXINE HYDROCHLORIDE 75 MG/1
75 CAPSULE, EXTENDED RELEASE ORAL DAILY
Status: DISCONTINUED | OUTPATIENT
Start: 2022-03-15 | End: 2022-03-17

## 2022-03-14 RX ORDER — POLYETHYLENE GLYCOL 3350 17 G/17G
17 POWDER, FOR SOLUTION ORAL DAILY PRN
Status: DISCONTINUED | OUTPATIENT
Start: 2022-03-14 | End: 2022-03-17

## 2022-03-14 RX ORDER — MELATONIN
1000 DAILY
Status: DISCONTINUED | OUTPATIENT
Start: 2022-03-15 | End: 2022-03-17

## 2022-03-14 RX ORDER — LEVOTHYROXINE SODIUM 0.15 MG/1
150 TABLET ORAL EVERY MORNING
Status: DISCONTINUED | OUTPATIENT
Start: 2022-03-15 | End: 2022-03-17

## 2022-03-14 RX ORDER — MELATONIN
5000 DAILY
Status: DISCONTINUED | OUTPATIENT
Start: 2022-03-15 | End: 2022-03-17

## 2022-03-14 RX ORDER — CEPHALEXIN 500 MG/1
500 CAPSULE ORAL 4 TIMES DAILY
COMMUNITY
End: 2022-03-17

## 2022-03-14 RX ORDER — ACETAMINOPHEN 325 MG/1
650 TABLET ORAL EVERY 4 HOURS PRN
Status: DISCONTINUED | OUTPATIENT
Start: 2022-03-14 | End: 2022-03-17

## 2022-03-14 RX ORDER — MELATONIN
3 NIGHTLY PRN
Status: DISCONTINUED | OUTPATIENT
Start: 2022-03-14 | End: 2022-03-17

## 2022-03-14 RX ORDER — ACETAMINOPHEN AND CODEINE PHOSPHATE 300; 30 MG/1; MG/1
1 TABLET ORAL EVERY 4 HOURS PRN
Status: DISCONTINUED | OUTPATIENT
Start: 2022-03-14 | End: 2022-03-17

## 2022-03-14 RX ORDER — VANCOMYCIN HYDROCHLORIDE
15 EVERY 12 HOURS
Status: DISCONTINUED | OUTPATIENT
Start: 2022-03-14 | End: 2022-03-16

## 2022-03-14 RX ORDER — ONDANSETRON 2 MG/ML
4 INJECTION INTRAMUSCULAR; INTRAVENOUS EVERY 6 HOURS PRN
Status: DISCONTINUED | OUTPATIENT
Start: 2022-03-14 | End: 2022-03-17

## 2022-03-14 RX ORDER — SENNOSIDES 8.6 MG
17.2 TABLET ORAL NIGHTLY PRN
Status: DISCONTINUED | OUTPATIENT
Start: 2022-03-14 | End: 2022-03-17

## 2022-03-14 RX ORDER — KETOROLAC TROMETHAMINE 15 MG/ML
15 INJECTION, SOLUTION INTRAMUSCULAR; INTRAVENOUS EVERY 6 HOURS PRN
Status: ACTIVE | OUTPATIENT
Start: 2022-03-14 | End: 2022-03-16

## 2022-03-14 NOTE — CONSULTS
[address] [place] [address]    Initial Pharmacokinetic Consult for Vancomycin AUC Dosing    Les Solitario is a 69year old patient who is being treated for cellulitis. Pt BMI > 40 kg/m2. Vancomycin 2g ivpb given in [facility], so initiate SS AUC dosing as first dose levels could not be drawn post infusion. Pharmacy has been asked to dose vancomycin by Dr. Mayra Laura. Weights:  Ideal body weight: 54.7 kg (120 lb 9.5 oz)  Adjusted ideal body weight: 80.9 kg (178 lb 5.7 oz)  Actual weight:  120.2 kg (265 lb)    Labs:  Lab Results   Component Value Date    CREATSERUM 0.67 03/14/2022      CrCl:  Estimated Creatinine Clearance: 68.4 mL/min (based on SCr of 0.67 mg/dL). Based on the above:    1. This patient has received a loading dose of Vancomycin  2000 mg IVPB (25mg/kg, capped at 2000 mg) x 1 dose in [facility] ER @0800. This will be followed by 1250 mg Q 12 hours based upon adjusted body weight of 80.9 kg and renal function. 2. Vancomycin peak and trough will be obtained at steady state in order to calculate AUC24. Goal AUC24 is 400-600 mg-h/L.    3. Pharmacy will order SCr as clinically indicated while on vancomycin to assess renal function. 4. Pharmacy will follow and monitor renal function, toxicity and efficacy. We appreciate the opportunity to assist in the care of this patient.     Lakia Grover, PharmD  3/14/2022  1:57 PM  [address] [place] [address] Extension: [phone]

## 2022-03-14 NOTE — ED QUICK NOTES
Orders for admission, patient is aware of plan and ready to go upstairs. Any questions, please call ED RN angeline at extension [phone].      Patient Covid vaccination status: Fully vaccinated and immunocompromised     COVID Test Ordered in ED: Rapid SARS-CoV-2 by PCR    COVID Suspicion at Admission: Low clinical suspicion for COVID    Running Infusions:  saline lock    Mental Status/LOC at time of transport: a and o x3    Other pertinent information:   CIWA score: N/A   NIH score:  N/A

## 2022-03-14 NOTE — ED QUICK NOTES
Pt  given and voiced understanding of instructions to take pt to be a direct admit. Iv saline locked and wrapped. Pt pain 2.

## 2022-03-14 NOTE — PLAN OF CARE
PF-ED admit 3/14 infection of middle finger of Rt hand, Pt is AAOX4, VSS, room air, IVF, w/ IV ABX, Rt middle finger is red and very swollen, erythema marked with skin marker, orders received from Ortho for warm, soapy water soaks, monitor for any output from finger tip, Pt doing well, all needs met, all safety measures in place, call light within reach, will CTM.

## 2022-03-14 NOTE — ED INITIAL ASSESSMENT (HPI)
Right 3rd finger with redness, pain and swelling that started on Friday.  Was seen at urgent care yesterday and [email or web address] was attempted, placed on ATB but redness and swelling is worse and spreading down to hand

## 2022-03-15 ENCOUNTER — APPOINTMENT (OUTPATIENT)
Dept: MRI IMAGING | Facility: HOSPITAL | Age: 67
DRG: 558 | End: 2022-03-15
Attending: ORTHOPAEDIC SURGERY
Payer: MEDICARE

## 2022-03-15 LAB
CREAT BLD-MCNC: 0.64 MG/DL
ERYTHROCYTE [DISTWIDTH] IN BLOOD BY AUTOMATED COUNT: 14.6 %
HCT VFR BLD AUTO: 39.7 %
HGB BLD-MCNC: 12 G/DL
MCH RBC QN AUTO: 28 PG (ref 26–34)
MCHC RBC AUTO-ENTMCNC: 30.2 G/DL (ref 31–37)
MCV RBC AUTO: 92.5 FL
PLATELET # BLD AUTO: 225.5 10(3)UL (ref 150–450)
RBC # BLD AUTO: 4.29 X10(6)UL
WBC # BLD AUTO: 6.8 X10(3) UL (ref 4–11)

## 2022-03-15 PROCEDURE — 99232 SBSQ HOSP IP/OBS MODERATE 35: CPT | Performed by: ORTHOPAEDIC SURGERY

## 2022-03-15 PROCEDURE — 99232 SBSQ HOSP IP/OBS MODERATE 35: CPT | Performed by: INTERNAL MEDICINE

## 2022-03-15 PROCEDURE — 73220 MRI UPPR EXTREMITY W/O&W/DYE: CPT | Performed by: ORTHOPAEDIC SURGERY

## 2022-03-15 NOTE — PROGRESS NOTES
Pharmacy Note:  Renal Adjustment for ampicillin/sulbactam (UNASYN)         Allegra Adair is a 69year old female who has been prescribed ampicillin/sulbactam 3g q6hr. Est CrCl: >60 mL/min    The dose has been adjusted to ampicillin/sulbactam 3g q8hr per hospital renal dose adjustment protocol. Pharmacy will follow and adjust dose as warranted for renal function changes.     Thank you,      Axel Watson, PharmD, BCPS  3/15/2022  12:44 PM

## 2022-03-15 NOTE — PROGRESS NOTES
[facility] Progress Note    Subjective: Patient reports pain is improved. No numbness/tingling in finger. Redness and swelling a little better on palm side of finger, about the same on back of finger tip. Has been doing warm soaks. Objective: awake, alert, appears comfortable  Able to actively extend the finger fully without pain  Erythema and swelling regressed volarly, not involving proximal phalanx, with +skin wrinkling over volar aspect of proximal phalanx  Erythema and swelling present about middle and distal phalanx volarly, and distal phalanx dorsally, with tenderness to palpation most significant over volar middle phalanx and dorsal distal phalanx. No expressible fluid  Nontender over volar proximal phalanx  Finger warm/appears well perfused, sensation intact to light touch radial and ulnar aspects      Assessment/Plan: 69year old female with acute atraumatic right third digit redness/swelling/pain. Some improvement in erythema/swelling overnight and patient with minimal pain. Still with persistent swelling/redness/tenderness about middle and distal phalanx. Discussed ordering mri to evaluate for drainable fluid collection. Keep npo for now while awaiting results in case of indication for OR.     Brittanie Santos MD, [address] Orthopedic Surgery  Phone [phone]  Fax [phone]

## 2022-03-15 NOTE — PLAN OF CARE
Patient alert and oriented , up ad arnie in room . Right middle finger remains very red and swollen ,warm to touch ,no drainage noted ,unable to flex the finger . Pain is minimal and controlled with scheduled toradol . Vital signs stable ,  on iv antibiotics and iv fluids . Warm soapy water soak done as ordered . NPO after midnight for possible I&D if no improvement . Plan of care updated , reminded to call for any needs . Will continue to monitor .

## 2022-03-15 NOTE — PLAN OF CARE
Pt a/ox4. Rates pain to finger 2-3/10. Declined pain medications when offered. Right middle finger red and swollen, tender to palpation. Pt unable to bend finger due to swelling. Denies numbness and tingling. Pulses intact. Continuing warm water soaks per orders Q2hrs, pt reports this aids with pain. MRI completed this am, no surgery indicated at this time. Vancomycin and rocephin this am, switched to unasyn and vanco this afternoon per ID. Up as tolerated in room and halls. Plan pending improvement in finger.

## 2022-03-16 LAB
CREAT BLD-MCNC: 0.62 MG/DL
VANCOMYCIN PEAK SERPL-MCNC: 24.9 UG/ML (ref 30–50)
VANCOMYCIN TROUGH SERPL-MCNC: 14.1 UG/ML (ref 10–20)

## 2022-03-16 PROCEDURE — 99232 SBSQ HOSP IP/OBS MODERATE 35: CPT | Performed by: ORTHOPAEDIC SURGERY

## 2022-03-16 PROCEDURE — 99232 SBSQ HOSP IP/OBS MODERATE 35: CPT | Performed by: HOSPITALIST

## 2022-03-16 RX ORDER — VANCOMYCIN HYDROCHLORIDE
1500 EVERY 12 HOURS
Status: DISCONTINUED | OUTPATIENT
Start: 2022-03-16 | End: 2022-03-17

## 2022-03-16 NOTE — PROGRESS NOTES
[facility] Progress Note    Subjective: Pain, redness swelling better on the volar finger. Has had some superficial desquamation on the dorsal finger near nail bed. Pain overall better. Warm soaks are helping. Objective: awake, alert, appears comfortable  Able to actively extend the finger fully without pain  Erythema, swelling, tenderness all significantly improved across volar finger  Dorsal finger near eponychial fold with small area of desquamation, no expressible fluid  Finger warm/appears well perfused, sensation intact to light touch radial and ulnar aspects      Imaging: MRI with edema in soft tissue, no focal fluid collection, no fluid in flexor tendon sheath      Assessment/Plan: 69year old female with acute atraumatic right third digit redness/swelling/pain. Significant improvement in erythema/swelling/pain volar > dorsal with antibiotics. MRI without drainable fluid collection. Continue antibiotics, warm soaks, milking about eponychium. Currently no surgical indication.     Dano Avalos MD, [address] Orthopedic Surgery  Phone [phone]  Fax [phone]

## 2022-03-16 NOTE — PROGRESS NOTES
Pt reporting that she may have had some discharge from her finger while soaking earlier this shift. Discussed possibility of obtaining a culture if future drainage is seen with MDs. Dr Matthieu Og would like a culture done if any drainage is obtained from the finger. Attempted to elicit drainage, no discharge from finger at this time.

## 2022-03-16 NOTE — PLAN OF CARE
A/O VSS on room air. Moderate pain to right 3rd finger relieved with tylenol #3. Right middle finger red, swollen and painful to touch. Unable to bend finger d/t swelling. Denies any numbness or tingling. Warm soapy soaks Q2 hours continued. Plan of care reviewed. Call light within reach.

## 2022-03-16 NOTE — PLAN OF CARE
Pt a/ox4. Pain increased 3/15/22, back down to manageable this am. Pt thinking she may be starting to have some drainage from her finger, will attempt to get a culture sample this morning. T#3 for pain control, taken as needed. Up ad arnie in room and patrick. IV unasyn and vancomycin per ID recommendations. No surgery needed as of 3/15/22, based on MRI results. Plan to home when ready.

## 2022-03-17 VITALS
OXYGEN SATURATION: 92 % | DIASTOLIC BLOOD PRESSURE: 102 MMHG | RESPIRATION RATE: 18 BRPM | TEMPERATURE: 99 F | SYSTOLIC BLOOD PRESSURE: 147 MMHG | BODY MASS INDEX: 45.24 KG/M2 | HEIGHT: 64 IN | WEIGHT: 265 LBS | HEART RATE: 62 BPM

## 2022-03-17 LAB — CREAT BLD-MCNC: 0.65 MG/DL

## 2022-03-17 PROCEDURE — 99239 HOSP IP/OBS DSCHRG MGMT >30: CPT | Performed by: HOSPITALIST

## 2022-03-17 PROCEDURE — 99232 SBSQ HOSP IP/OBS MODERATE 35: CPT | Performed by: ORTHOPAEDIC SURGERY

## 2022-03-17 RX ORDER — AMOXICILLIN AND CLAVULANATE POTASSIUM 875; 125 MG/1; MG/1
1 TABLET, FILM COATED ORAL 2 TIMES DAILY
Qty: 28 TABLET | Refills: 0 | Status: SHIPPED | OUTPATIENT
Start: 2022-03-17 | End: 2022-03-31

## 2022-03-17 NOTE — PLAN OF CARE
A&O x 4, denies pain to Rt 3rd digit, redness improved since yesterday, seen by  - home w/ po abx, IV dc'd, awaiting wheelchair.

## 2022-03-17 NOTE — PROGRESS NOTES
[facility] Progress Note    Subjective: Symptoms much better. Pain only near the tip of the finger now. Objective: awake, alert, appears comfortable  Able to actively extend the finger fully without pain, flexion limited by swelling  Erythema nearly completely resolved from volar finger, nontender P1/minimally tender P2/mild-mod tenderness P2-3; swelling significantly improved, +wrinkling throughout digit  Dorsal finger near eponychium swelling and tenderness improved  Finger warm/appears well perfused, sensation intact to light touch radial and ulnar aspects      Assessment/Plan: 69year old female with acute atraumatic right third digit redness/swelling/pain. Significant improvement in erythema/swelling/pain volar > dorsal with antibiotics. MRI without drainable fluid collection. Continue antibiotics, warm soaks, milking about eponychium. Currently no surgical indication. Follow up ID recs for discharge antibiotic.     Tim Rangel MD, [address] Orthopedic Surgery  Phone [phone]  Fax [phone]

## 2022-03-17 NOTE — PLAN OF CARE
A&O x 4. VSS. On RA. No c/o pain this evening. Right third finger swollen and red. Warm water soaks Q 2 hrs per MD. Up at arnie to bathroom, voiding without issue. Declines SCD's, on Lovenox, ambulating often. Reviewed POC and pain management with pt. Plan TBD, IV abx's, try to obtain culture specimen if any drainage. Will continue to monitor.

## 2022-03-18 ENCOUNTER — PATIENT OUTREACH (OUTPATIENT)
Dept: CASE MANAGEMENT | Age: 67
End: 2022-03-18

## 2022-03-18 PROCEDURE — 1111F DSCHRG MED/CURRENT MED MERGE: CPT

## 2022-03-24 ENCOUNTER — OFFICE VISIT (OUTPATIENT)
Dept: FAMILY MEDICINE CLINIC | Facility: CLINIC | Age: 67
End: 2022-03-24
Payer: MEDICARE

## 2022-03-24 VITALS
WEIGHT: 266.38 LBS | OXYGEN SATURATION: 99 % | HEIGHT: 64.5 IN | HEART RATE: 72 BPM | BODY MASS INDEX: 44.92 KG/M2 | SYSTOLIC BLOOD PRESSURE: 130 MMHG | DIASTOLIC BLOOD PRESSURE: 70 MMHG | RESPIRATION RATE: 16 BRPM | TEMPERATURE: 98 F

## 2022-03-24 DIAGNOSIS — E78.5 HYPERLIPIDEMIA, UNSPECIFIED HYPERLIPIDEMIA TYPE: ICD-10-CM

## 2022-03-24 DIAGNOSIS — Z00.00 LABORATORY EXAM ORDERED AS PART OF ROUTINE GENERAL MEDICAL EXAMINATION: ICD-10-CM

## 2022-03-24 DIAGNOSIS — Z12.31 BREAST CANCER SCREENING BY MAMMOGRAM: ICD-10-CM

## 2022-03-24 DIAGNOSIS — R73.03 PREDIABETES: ICD-10-CM

## 2022-03-24 DIAGNOSIS — L03.011 CELLULITIS OF FINGER OF RIGHT HAND: Primary | ICD-10-CM

## 2022-03-24 DIAGNOSIS — I10 ESSENTIAL HYPERTENSION: ICD-10-CM

## 2022-03-24 PROCEDURE — 99214 OFFICE O/P EST MOD 30 MIN: CPT | Performed by: FAMILY MEDICINE

## 2022-03-24 RX ORDER — SIMVASTATIN 20 MG
20 TABLET ORAL DAILY
Qty: 90 TABLET | Refills: 3 | Status: SHIPPED | OUTPATIENT
Start: 2022-03-24

## 2022-03-24 RX ORDER — LISINOPRIL 10 MG/1
10 TABLET ORAL DAILY
Qty: 90 TABLET | Refills: 3 | Status: SHIPPED | OUTPATIENT
Start: 2022-03-24

## 2022-03-24 RX ORDER — HYDROCHLOROTHIAZIDE 12.5 MG/1
12.5 TABLET ORAL DAILY
Qty: 90 TABLET | Refills: 3 | Status: SHIPPED | OUTPATIENT
Start: 2022-03-24

## 2022-05-09 ENCOUNTER — HOSPITAL ENCOUNTER (OUTPATIENT)
Dept: MAMMOGRAPHY | Age: 67
Discharge: HOME | End: 2022-05-09
Attending: FAMILY MEDICINE
Payer: MEDICARE

## 2022-05-09 DIAGNOSIS — Z12.31 BREAST CANCER SCREENING BY MAMMOGRAM: ICD-10-CM

## 2022-05-09 PROCEDURE — 77063 BREAST TOMOSYNTHESIS BI: CPT | Performed by: FAMILY MEDICINE

## 2022-05-09 PROCEDURE — 77067 SCR MAMMO BI INCL CAD: CPT | Performed by: FAMILY MEDICINE

## 2022-08-02 DIAGNOSIS — E03.9 ACQUIRED HYPOTHYROIDISM: ICD-10-CM

## 2022-08-02 DIAGNOSIS — F33.41 RECURRENT MAJOR DEPRESSIVE DISORDER, IN PARTIAL REMISSION (HCC): ICD-10-CM

## 2022-08-02 RX ORDER — VENLAFAXINE HYDROCHLORIDE 75 MG/1
75 CAPSULE, EXTENDED RELEASE ORAL DAILY
Qty: 90 CAPSULE | Refills: 0 | Status: SHIPPED | OUTPATIENT
Start: 2022-08-02

## 2022-08-02 RX ORDER — LEVOTHYROXINE SODIUM 150 UG/1
1 CAPSULE ORAL EVERY MORNING
Qty: 90 CAPSULE | Refills: 0 | Status: SHIPPED | OUTPATIENT
Start: 2022-08-02

## 2022-08-05 ENCOUNTER — OFFICE VISIT (OUTPATIENT)
Dept: FAMILY MEDICINE CLINIC | Facility: CLINIC | Age: 67
End: 2022-08-05
Payer: MEDICARE

## 2022-08-05 VITALS
OXYGEN SATURATION: 95 % | WEIGHT: 267 LBS | TEMPERATURE: 98 F | BODY MASS INDEX: 45.03 KG/M2 | SYSTOLIC BLOOD PRESSURE: 122 MMHG | HEART RATE: 65 BPM | HEIGHT: 64.5 IN | RESPIRATION RATE: 14 BRPM | DIASTOLIC BLOOD PRESSURE: 82 MMHG

## 2022-08-05 DIAGNOSIS — L03.011 CELLULITIS OF FINGER OF RIGHT HAND: Primary | ICD-10-CM

## 2022-08-05 PROCEDURE — 1111F DSCHRG MED/CURRENT MED MERGE: CPT | Performed by: FAMILY MEDICINE

## 2022-08-05 PROCEDURE — 99214 OFFICE O/P EST MOD 30 MIN: CPT | Performed by: FAMILY MEDICINE

## 2022-08-05 RX ORDER — AMOXICILLIN AND CLAVULANATE POTASSIUM 875; 125 MG/1; MG/1
1 TABLET, FILM COATED ORAL 2 TIMES DAILY
Qty: 20 TABLET | Refills: 0 | Status: SHIPPED | OUTPATIENT
Start: 2022-08-05 | End: 2022-08-15

## 2022-08-19 ENCOUNTER — TELEPHONE (OUTPATIENT)
Dept: ORTHOPEDICS CLINIC | Facility: CLINIC | Age: 67
End: 2022-08-19

## 2022-08-19 DIAGNOSIS — M79.644 PAIN IN FINGER OF RIGHT HAND: Primary | ICD-10-CM

## 2022-08-19 NOTE — TELEPHONE ENCOUNTER
Future Appointments   Date Time Provider Ethan Slaughter   8/30/2022  9:20 AM Maximiliano Vuong MD [facility]     This patient is coming for RT Hand Ring Finger Cellulitis. There was recent imaging done in epic. Please advise if additional views are needed for this appt. Thanks.     Patient can be reached at [phone]

## 2022-08-19 NOTE — TELEPHONE ENCOUNTER
Pt being seen for right middle finger pain. Xrays ordered for the finger, and should be completed before the appt. Please schedule and notify patient to arrive early for the imaging.  Thank you!!

## 2022-08-22 ENCOUNTER — HOSPITAL ENCOUNTER (OUTPATIENT)
Dept: GENERAL RADIOLOGY | Age: 67
Discharge: HOME | End: 2022-08-22
Attending: ORTHOPAEDIC SURGERY
Payer: MEDICARE

## 2022-08-22 DIAGNOSIS — M79.644 PAIN IN FINGER OF RIGHT HAND: ICD-10-CM

## 2022-08-22 PROCEDURE — 73140 X-RAY EXAM OF FINGER(S): CPT | Performed by: ORTHOPAEDIC SURGERY

## 2022-08-30 ENCOUNTER — OFFICE VISIT (OUTPATIENT)
Dept: ORTHOPEDICS CLINIC | Facility: CLINIC | Age: 67
End: 2022-08-30
Payer: MEDICARE

## 2022-08-30 VITALS — WEIGHT: 267 LBS | BODY MASS INDEX: 45.03 KG/M2 | HEIGHT: 64.5 IN

## 2022-08-30 DIAGNOSIS — L98.0 PYOGENIC GRANULOMA: Primary | ICD-10-CM

## 2022-09-02 ENCOUNTER — TELEPHONE (OUTPATIENT)
Dept: ORTHOPEDICS CLINIC | Facility: CLINIC | Age: 67
End: 2022-09-02

## 2022-09-02 DIAGNOSIS — L98.0 PYOGENIC GRANULOMA: Primary | ICD-10-CM

## 2022-09-02 NOTE — TELEPHONE ENCOUNTER
Surgeon: Dr. Kervin Berry    Date of Surgery: 9/7       Post Op Appt: 9/20 @ 9AM     Prior Authorization:   Inpatient or Outpatient: Outpatient  Facility: [facility]  Work Comp: NO  CPT: 36195  DX: L98.0     Surgical Assistant: Parisa Garcia     Preadmission Testing: PER ANESTHESIA GUIDELINES     Pre-Op Clearance Requested: NONE    DME:  NONE NEEDED    Rehab Services Ordered: NONE     Disability Paperwork: NONE    On [place] Calendar: YES    Notes: EXCISION OF RIGHT MIDDLE FINGER MASS.

## 2022-09-02 NOTE — PROGRESS NOTES
[facility]   Name: Alli Mcdowell  MRN: [id number]   : 1955  Surgical Consent:  Excision of right middle finger mass  Diagnosis:   Pyogenic granuloma of right middle finger [L98.0]  Laterality:   Right Middle  Procedure Codes:  Excision of  Skin lesion (10620)  Disposition:  Outpatient  Operative Time:  45 mins  Antibiotics:  Ancef  Anesthesia Type:  Local Only (1% Lidocaine, 0.5% Marcaine - No Epi - 1:1 mix)  Clearance:   NONE  Equipment:  [place] [employer] or Local 1% lidocaine, 0.5% marcaine - No Epi - 1:1 mix  Positioning:  Supine with arm table on transport cart  Assistant request:   Assistant: Juan Oliver PA-C  Anesthesiologist Request:   None    Follow Up    1 week post op    Other:     Do not shave or clip arm hair

## 2022-09-02 NOTE — TELEPHONE ENCOUNTER
No prior auth or pre-cert required for OP CPT CODE 28122 - medicare- for tracking purposes only  Medicare is active and no auth is required  Procedure not on auth list     [email or web address] pdf

## 2022-09-07 ENCOUNTER — HOSPITAL ENCOUNTER (OUTPATIENT)
Facility: HOSPITAL | Age: 67
Setting detail: HOSPITAL OUTPATIENT SURGERY
Discharge: HOME | End: 2022-09-07
Attending: ORTHOPAEDIC SURGERY | Admitting: ORTHOPAEDIC SURGERY
Payer: MEDICARE

## 2022-09-07 ENCOUNTER — TELEPHONE (OUTPATIENT)
Dept: ORTHOPEDICS CLINIC | Facility: CLINIC | Age: 67
End: 2022-09-07

## 2022-09-07 VITALS
WEIGHT: 245.13 LBS | RESPIRATION RATE: 16 BRPM | OXYGEN SATURATION: 98 % | TEMPERATURE: 98 F | HEART RATE: 70 BPM | SYSTOLIC BLOOD PRESSURE: 96 MMHG | BODY MASS INDEX: 41.34 KG/M2 | DIASTOLIC BLOOD PRESSURE: 60 MMHG | HEIGHT: 64.5 IN

## 2022-09-07 DIAGNOSIS — L98.0 PYOGENIC GRANULOMA: ICD-10-CM

## 2022-09-07 PROCEDURE — 0JBJ0ZZ EXCISION OF RIGHT HAND SUBCUTANEOUS TISSUE AND FASCIA, OPEN APPROACH: ICD-10-PCS | Performed by: ORTHOPAEDIC SURGERY

## 2022-09-07 PROCEDURE — 0HDQXZZ EXTRACTION OF FINGER NAIL, EXTERNAL APPROACH: ICD-10-PCS | Performed by: ORTHOPAEDIC SURGERY

## 2022-09-07 PROCEDURE — 88305 TISSUE EXAM BY PATHOLOGIST: CPT | Performed by: ORTHOPAEDIC SURGERY

## 2022-09-07 RX ORDER — CEFAZOLIN SODIUM IN 0.9 % NACL 3 G/100 ML
3 INTRAVENOUS SOLUTION, PIGGYBACK (ML) INTRAVENOUS ONCE
Status: COMPLETED | OUTPATIENT
Start: 2022-09-07 | End: 2022-09-07

## 2022-09-07 RX ORDER — LIDOCAINE HYDROCHLORIDE 10 MG/ML
INJECTION, SOLUTION INFILTRATION; PERINEURAL AS NEEDED
Status: DISCONTINUED | OUTPATIENT
Start: 2022-09-07 | End: 2022-09-07 | Stop reason: HOSPADM

## 2022-09-07 RX ORDER — BUPIVACAINE HYDROCHLORIDE 5 MG/ML
INJECTION, SOLUTION EPIDURAL; INTRACAUDAL AS NEEDED
Status: DISCONTINUED | OUTPATIENT
Start: 2022-09-07 | End: 2022-09-07 | Stop reason: HOSPADM

## 2022-09-07 NOTE — TELEPHONE ENCOUNTER
LVM for patient to call back and confirm post op appt.   Future Appointments   Date Time Provider Ethan Kasandra   9/9/2022  7:45 AM REF SO PFLD REF [id number] Ref PFLD 17   9/12/2022 10:00 AM Alicia Caban MD [id number]   9/15/2022  9:00 AM Mary Brown, DO [facility] [address]

## 2022-09-07 NOTE — INTERVAL H&P NOTE
Pre-op Diagnosis: Pyogenic granuloma [L98.0]    The above referenced H&P was reviewed by SAMSON Morillo on 9/7/2022, the patient was examined and no significant changes have occurred in the patient's condition since the H&P was performed. I discussed with the patient and/or legal representative the potential benefits, risks and side effects of this procedure; the likelihood of the patient achieving goals; and potential problems that might occur during recuperation. I discussed reasonable alternatives to the procedure, including risks, benefits and side effects related to the alternatives and risks related to not receiving this procedure. We will proceed with procedure as planned.

## 2022-09-07 NOTE — BRIEF OP NOTE
Pre-Operative Diagnosis: Pyogenic granuloma [L98.0]     Post-Operative Diagnosis: Pyogenic granuloma [L98.0]      Procedure Performed:   EXCISION OF RIGHT MIDDLE FINGER MASS. Surgeon(s) and Role:     * Yon Olivarez MD - Primary    Assistant(s):  PA: Ananda Miller     Surgical Findings: pyogenic granuloma?      Specimen: mass     Estimated Blood Loss: Blood Output: 1 mL (9/7/2022  2:53 PM)      Dictation Number:  none    Tricia Byrne MD  9/7/2022  3:01 PM

## 2022-09-09 ENCOUNTER — LAB ENCOUNTER (OUTPATIENT)
Dept: LAB | Age: 67
End: 2022-09-09
Attending: FAMILY MEDICINE
Payer: MEDICARE

## 2022-09-09 DIAGNOSIS — Z00.00 LABORATORY EXAM ORDERED AS PART OF ROUTINE GENERAL MEDICAL EXAMINATION: ICD-10-CM

## 2022-09-09 DIAGNOSIS — R73.03 PREDIABETES: ICD-10-CM

## 2022-09-09 DIAGNOSIS — E78.5 HYPERLIPIDEMIA, UNSPECIFIED HYPERLIPIDEMIA TYPE: ICD-10-CM

## 2022-09-09 LAB
ALBUMIN SERPL-MCNC: 3.1 G/DL (ref 3.4–5)
ALBUMIN/GLOB SERPL: 0.8 {RATIO} (ref 1–2)
ALP LIVER SERPL-CCNC: 62 U/L
ALT SERPL-CCNC: 19 U/L
ANION GAP SERPL CALC-SCNC: 6 MMOL/L (ref 0–18)
AST SERPL-CCNC: 18 U/L (ref 15–37)
BASOPHILS # BLD AUTO: 0.05 X10(3) UL (ref 0–0.2)
BASOPHILS NFR BLD AUTO: 0.9 %
BILIRUB SERPL-MCNC: 0.4 MG/DL (ref 0.1–2)
BUN BLD-MCNC: 23 MG/DL (ref 7–18)
CALCIUM BLD-MCNC: 8.9 MG/DL (ref 8.5–10.1)
CHLORIDE SERPL-SCNC: 107 MMOL/L (ref 98–112)
CHOLEST SERPL-MCNC: 127 MG/DL (ref ?–200)
CO2 SERPL-SCNC: 27 MMOL/L (ref 21–32)
CREAT BLD-MCNC: 0.74 MG/DL
EOSINOPHIL # BLD AUTO: 0.29 X10(3) UL (ref 0–0.7)
EOSINOPHIL NFR BLD AUTO: 5.1 %
ERYTHROCYTE [DISTWIDTH] IN BLOOD BY AUTOMATED COUNT: 15.1 %
EST. AVERAGE GLUCOSE BLD GHB EST-MCNC: 120 MG/DL (ref 68–126)
FASTING PATIENT LIPID ANSWER: YES
FASTING STATUS PATIENT QL REPORTED: YES
GFR SERPLBLD BASED ON 1.73 SQ M-ARVRAT: 88 ML/MIN/1.73M2 (ref 60–?)
GLOBULIN PLAS-MCNC: 4.1 G/DL (ref 2.8–4.4)
GLUCOSE BLD-MCNC: 111 MG/DL (ref 70–99)
HBA1C MFR BLD: 5.8 % (ref ?–5.7)
HCT VFR BLD AUTO: 41.8 %
HDLC SERPL-MCNC: 46 MG/DL (ref 40–59)
HGB BLD-MCNC: 12.9 G/DL
IMM GRANULOCYTES # BLD AUTO: 0.03 X10(3) UL (ref 0–1)
IMM GRANULOCYTES NFR BLD: 0.5 %
LDLC SERPL CALC-MCNC: 54 MG/DL (ref ?–100)
LYMPHOCYTES # BLD AUTO: 0.99 X10(3) UL (ref 1–4)
LYMPHOCYTES NFR BLD AUTO: 17.3 %
MCH RBC QN AUTO: 28.5 PG (ref 26–34)
MCHC RBC AUTO-ENTMCNC: 30.9 G/DL (ref 31–37)
MCV RBC AUTO: 92.5 FL
MONOCYTES # BLD AUTO: 0.49 X10(3) UL (ref 0.1–1)
MONOCYTES NFR BLD AUTO: 8.6 %
NEUTROPHILS # BLD AUTO: 3.87 X10 (3) UL (ref 1.5–7.7)
NEUTROPHILS # BLD AUTO: 3.87 X10(3) UL (ref 1.5–7.7)
NEUTROPHILS NFR BLD AUTO: 67.6 %
NONHDLC SERPL-MCNC: 81 MG/DL (ref ?–130)
OSMOLALITY SERPL CALC.SUM OF ELEC: 294 MOSM/KG (ref 275–295)
POTASSIUM SERPL-SCNC: 3.8 MMOL/L (ref 3.5–5.1)
PROT SERPL-MCNC: 7.2 G/DL (ref 6.4–8.2)
RBC # BLD AUTO: 4.52 X10(6)UL
SODIUM SERPL-SCNC: 140 MMOL/L (ref 136–145)
TRIGL SERPL-MCNC: 161 MG/DL (ref 30–149)
TSI SER-ACNC: 1.22 MIU/ML (ref 0.36–3.74)
VLDLC SERPL CALC-MCNC: 23 MG/DL (ref 0–30)
WBC # BLD AUTO: 5.7 X10(3) UL (ref 4–11)

## 2022-09-09 PROCEDURE — 36415 COLL VENOUS BLD VENIPUNCTURE: CPT

## 2022-09-09 PROCEDURE — 80053 COMPREHEN METABOLIC PANEL: CPT

## 2022-09-09 PROCEDURE — 84443 ASSAY THYROID STIM HORMONE: CPT

## 2022-09-09 PROCEDURE — 80061 LIPID PANEL: CPT

## 2022-09-09 PROCEDURE — 83036 HEMOGLOBIN GLYCOSYLATED A1C: CPT

## 2022-09-09 PROCEDURE — 85025 COMPLETE CBC W/AUTO DIFF WBC: CPT

## 2022-09-12 ENCOUNTER — OFFICE VISIT (OUTPATIENT)
Dept: ORTHOPEDICS CLINIC | Facility: CLINIC | Age: 67
End: 2022-09-12
Payer: MEDICARE

## 2022-09-12 VITALS — BODY MASS INDEX: 45.2 KG/M2 | HEIGHT: 64.5 IN | WEIGHT: 268 LBS

## 2022-09-12 DIAGNOSIS — L98.0 PYOGENIC GRANULOMA: Primary | ICD-10-CM

## 2022-09-12 PROCEDURE — 1125F AMNT PAIN NOTED PAIN PRSNT: CPT | Performed by: ORTHOPAEDIC SURGERY

## 2022-09-12 PROCEDURE — 99213 OFFICE O/P EST LOW 20 MIN: CPT | Performed by: ORTHOPAEDIC SURGERY

## 2022-09-12 NOTE — OPERATIVE REPORT
Operative Note    Patient Name: Osman Martínez    Preoperative Diagnosis:     1. Right middle finger mass    Postoperative Diagnosis:     1. Right middle finger mass    Surgeon(s) and Role:     * Sahra Dial MD - Primary     Assistant: PA: SAMSON Tariq     A PA was needed for the successful completion of this case. She was essential for the proper positioning of patient, manipulation of instruments, proper exposure, manipulation of soft tissue, and wound closure. Procedures:     1. Removal right middle finger nail plate (CPT 98240)  2. Excision of right middle finger mass (CPT 49563)    Antibiotics: Ance f2g    Surgical Findings: superficial skin lesion, friable    Anesthesia: Local    Complications: None    Specimen: skin lesion    Condition: Stable    Estimated Blood Loss: 1mL    Indications:  69year old female who was noted to have a right middle finger lesion at the nail plate and lateral nail fold area. Despite local wound care this failed to resolve. Pyogenic granuloma suspected. Decision to proceed with surgical excision was made after failing nonsurgical management. The risks associated with the procedure, expected  outcome, the expected time to recovery, and the possible need for rehab required were discussed with the patient. The patient consented to the operation having understood all the above. Procedure:  Patient was met in the preoperative holding area where consent was verified, laterality was marked with the surgeon's initials, and the H&P was updated. Patient was brought to the operating room on a transport cart. Patient was transferred from the transport cart onto the operating room table and placed in a supine position. An upper arm tourniquet was placed. The arm was prepped and draped in the usual sterile fashion. A surgical timeout was performed. Antibiotics were fully infused. Local was infiltrated into the subcutaneous tissue until adequate anesthesia was noted. The digit was exsanguinated using a finger tourniquet. In order to fully visualize the lesion, the nail plate was removed with a Nancy Minium. A 15 blade was used to ellipse the lesion completely. This was sent to pathology for analysis. The wound was irrigated with sterile saline prior to closure. Closure:  5-0 chromic suture was used to reapproximate the skin edges. Bacitracin and Adaptic was applied to the incision site. Adaptic was placed under the nail fold. Dressing/Splint:  4 x 4 gauze and 1 inch Coban was used to hold the dressing in place. Post Operative:  Patient was taken to PACU for further recovery and discharge home. Collins Reese MD  Hand, Wrist, & Elbow Surgery  [facility]  [facility], [address]  [email or web address]  t: [id number]  f: [phone]

## 2022-09-15 ENCOUNTER — TELEPHONE (OUTPATIENT)
Dept: FAMILY MEDICINE CLINIC | Facility: CLINIC | Age: 67
End: 2022-09-15

## 2022-09-15 ENCOUNTER — OFFICE VISIT (OUTPATIENT)
Dept: FAMILY MEDICINE CLINIC | Facility: CLINIC | Age: 67
End: 2022-09-15
Payer: MEDICARE

## 2022-09-15 VITALS
RESPIRATION RATE: 16 BRPM | TEMPERATURE: 97 F | BODY MASS INDEX: 45.07 KG/M2 | HEIGHT: 64.5 IN | SYSTOLIC BLOOD PRESSURE: 118 MMHG | WEIGHT: 267.25 LBS | DIASTOLIC BLOOD PRESSURE: 60 MMHG | OXYGEN SATURATION: 98 % | HEART RATE: 74 BPM

## 2022-09-15 DIAGNOSIS — E78.5 HYPERLIPIDEMIA, UNSPECIFIED HYPERLIPIDEMIA TYPE: Primary | ICD-10-CM

## 2022-09-15 DIAGNOSIS — R73.03 PREDIABETES: ICD-10-CM

## 2022-09-15 DIAGNOSIS — Z00.00 ENCOUNTER FOR ANNUAL HEALTH EXAMINATION: Primary | ICD-10-CM

## 2022-09-15 DIAGNOSIS — E03.9 ACQUIRED HYPOTHYROIDISM: ICD-10-CM

## 2022-09-15 DIAGNOSIS — F33.41 RECURRENT MAJOR DEPRESSIVE DISORDER, IN PARTIAL REMISSION (HCC): ICD-10-CM

## 2022-09-15 DIAGNOSIS — Z78.0 POSTMENOPAUSAL: ICD-10-CM

## 2022-09-15 RX ORDER — VENLAFAXINE HYDROCHLORIDE 75 MG/1
75 CAPSULE, EXTENDED RELEASE ORAL DAILY
Qty: 90 CAPSULE | Refills: 3 | Status: SHIPPED | OUTPATIENT
Start: 2022-09-15

## 2022-09-15 RX ORDER — LEVOTHYROXINE SODIUM 150 UG/1
1 CAPSULE ORAL EVERY MORNING
Qty: 90 CAPSULE | Refills: 3 | Status: SHIPPED | OUTPATIENT
Start: 2022-09-15

## 2022-09-15 NOTE — TELEPHONE ENCOUNTER
Future Appointments   Date Time Provider Ethan Slaughter   10/6/2022  8:20 AM Ambrose Martin MD [facility]   3/13/2023  9:00 AM Kiki Gant, DO [facility] [address]     Patient states she does labs every 6 mo, please advise.

## 2022-10-04 ENCOUNTER — HOSPITAL ENCOUNTER (OUTPATIENT)
Dept: BONE DENSITY | Age: 67
Discharge: HOME | End: 2022-10-04
Attending: FAMILY MEDICINE
Payer: MEDICARE

## 2022-10-04 DIAGNOSIS — Z78.0 POSTMENOPAUSAL: ICD-10-CM

## 2022-10-04 PROCEDURE — 77080 DXA BONE DENSITY AXIAL: CPT | Performed by: FAMILY MEDICINE

## 2022-10-06 ENCOUNTER — OFFICE VISIT (OUTPATIENT)
Dept: ORTHOPEDICS CLINIC | Facility: CLINIC | Age: 67
End: 2022-10-06
Payer: MEDICARE

## 2022-10-06 VITALS — HEIGHT: 63 IN | WEIGHT: 264 LBS | BODY MASS INDEX: 46.78 KG/M2

## 2022-10-06 DIAGNOSIS — M79.644 PAIN IN FINGER OF RIGHT HAND: Primary | ICD-10-CM

## 2022-10-06 PROCEDURE — 99024 POSTOP FOLLOW-UP VISIT: CPT | Performed by: PHYSICIAN ASSISTANT

## 2022-10-06 PROCEDURE — 1126F AMNT PAIN NOTED NONE PRSNT: CPT | Performed by: PHYSICIAN ASSISTANT

## 2022-10-11 ENCOUNTER — PATIENT MESSAGE (OUTPATIENT)
Dept: FAMILY MEDICINE CLINIC | Facility: CLINIC | Age: 67
End: 2022-10-11

## 2022-10-11 ENCOUNTER — OFFICE VISIT (OUTPATIENT)
Dept: FAMILY MEDICINE CLINIC | Facility: CLINIC | Age: 67
End: 2022-10-11
Payer: MEDICARE

## 2022-10-11 VITALS
HEART RATE: 120 BPM | HEIGHT: 63.5 IN | WEIGHT: 260 LBS | SYSTOLIC BLOOD PRESSURE: 140 MMHG | BODY MASS INDEX: 45.5 KG/M2 | DIASTOLIC BLOOD PRESSURE: 80 MMHG | TEMPERATURE: 98 F

## 2022-10-11 DIAGNOSIS — H66.002 NON-RECURRENT ACUTE SUPPURATIVE OTITIS MEDIA OF LEFT EAR WITHOUT SPONTANEOUS RUPTURE OF TYMPANIC MEMBRANE: Primary | ICD-10-CM

## 2022-10-11 DIAGNOSIS — J06.9 VIRAL URI WITH COUGH: ICD-10-CM

## 2022-10-11 PROCEDURE — 87637 SARSCOV2&INF A&B&RSV AMP PRB: CPT | Performed by: NURSE PRACTITIONER

## 2022-10-11 PROCEDURE — 99213 OFFICE O/P EST LOW 20 MIN: CPT | Performed by: NURSE PRACTITIONER

## 2022-10-11 RX ORDER — AMOXICILLIN AND CLAVULANATE POTASSIUM 875; 125 MG/1; MG/1
1 TABLET, FILM COATED ORAL 2 TIMES DAILY
Qty: 20 TABLET | Refills: 0 | Status: SHIPPED | OUTPATIENT
Start: 2022-10-11 | End: 2022-10-21

## 2022-10-11 RX ORDER — BENZONATATE 200 MG/1
200 CAPSULE ORAL 3 TIMES DAILY PRN
Qty: 20 CAPSULE | Refills: 0 | Status: SHIPPED | OUTPATIENT
Start: 2022-10-11 | End: 2022-10-18

## 2022-10-11 NOTE — TELEPHONE ENCOUNTER
From: Constantine Bernal  To: Jasmyn Reese DO  Sent: 10/11/2022 8:07 AM CDT  Subject: Possible ear infection     Good morning Dr Laquita Canada. The past couple of days I have been suffering with sore throat, bad cough and semi ear pain. I have been taking NyQuil and the daytime equivalent. But this morning I woke up with fluid in my left ear. This is the ear I had problems with before and had surgery. They had put a tube in it before surgery but removed it 6 months afterwards. Is this something I should be concerned about or just let the cold run its course?

## 2022-10-12 LAB
FLUAV + FLUBV RNA SPEC NAA+PROBE: NOT DETECTED
FLUAV + FLUBV RNA SPEC NAA+PROBE: NOT DETECTED
RSV RNA SPEC NAA+PROBE: NOT DETECTED
SARS-COV-2 RNA RESP QL NAA+PROBE: NOT DETECTED

## 2022-10-14 ENCOUNTER — PATIENT MESSAGE (OUTPATIENT)
Dept: FAMILY MEDICINE CLINIC | Facility: CLINIC | Age: 67
End: 2022-10-14

## 2022-10-14 NOTE — TELEPHONE ENCOUNTER
Can you fit this patient in Monday for ear infection f/u?   No available appointments with any providers currently

## 2022-10-14 NOTE — TELEPHONE ENCOUNTER
From: Giovanni Escobedo  To: Noelle Nava DO  Sent: 10/11/2022 8:07 AM CDT  Subject: Possible ear infection     Good morning Dr Andre Petersen. The past couple of days I have been suffering with sore throat, bad cough and semi ear pain. I have been taking NyQuil and the daytime equivalent. But this morning I woke up with fluid in my left ear. This is the ear I had problems with before and had surgery. They had put a tube in it before surgery but removed it 6 months afterwards. Is this something I should be concerned about or just let the cold run its course?

## 2022-10-14 NOTE — TELEPHONE ENCOUNTER
From: Imani Shen  To: Dotty Cason DO  Sent: 10/11/2022 8:07 AM CDT  Subject: Possible ear infection     Good morning Dr Moo Baird. The past couple of days I have been suffering with sore throat, bad cough and semi ear pain. I have been taking NyQuil and the daytime equivalent. But this morning I woke up with fluid in my left ear. This is the ear I had problems with before and had surgery. They had put a tube in it before surgery but removed it 6 months afterwards. Is this something I should be concerned about or just let the cold run its course?

## 2022-10-17 ENCOUNTER — OFFICE VISIT (OUTPATIENT)
Dept: FAMILY MEDICINE CLINIC | Facility: CLINIC | Age: 67
End: 2022-10-17
Payer: MEDICARE

## 2022-10-17 VITALS
TEMPERATURE: 97 F | SYSTOLIC BLOOD PRESSURE: 138 MMHG | HEART RATE: 80 BPM | OXYGEN SATURATION: 98 % | HEIGHT: 63.5 IN | RESPIRATION RATE: 16 BRPM | WEIGHT: 263 LBS | BODY MASS INDEX: 46.02 KG/M2 | DIASTOLIC BLOOD PRESSURE: 80 MMHG

## 2022-10-17 DIAGNOSIS — R05.1 ACUTE COUGH: Primary | ICD-10-CM

## 2022-10-17 DIAGNOSIS — J40 BRONCHITIS: ICD-10-CM

## 2022-10-17 DIAGNOSIS — H65.193 ACUTE MEE (MIDDLE EAR EFFUSION), BILATERAL: ICD-10-CM

## 2022-10-17 DIAGNOSIS — I10 ESSENTIAL HYPERTENSION: ICD-10-CM

## 2022-10-17 DIAGNOSIS — H92.02 LEFT EAR PAIN: ICD-10-CM

## 2022-10-17 PROCEDURE — 99214 OFFICE O/P EST MOD 30 MIN: CPT | Performed by: FAMILY MEDICINE

## 2022-10-17 PROCEDURE — 1125F AMNT PAIN NOTED PAIN PRSNT: CPT | Performed by: FAMILY MEDICINE

## 2022-10-17 RX ORDER — LETROZOLE 2.5 MG/1
TABLET, FILM COATED ORAL
COMMUNITY
Start: 2022-10-09

## 2022-10-17 RX ORDER — ALBUTEROL SULFATE 90 UG/1
2 AEROSOL, METERED RESPIRATORY (INHALATION) EVERY 6 HOURS PRN
Qty: 8 G | Refills: 0 | Status: SHIPPED | OUTPATIENT
Start: 2022-10-17 | End: 2022-10-27

## 2022-10-17 RX ORDER — METHYLPREDNISOLONE 4 MG/1
TABLET ORAL
Qty: 21 EACH | Refills: 0 | Status: SHIPPED | OUTPATIENT
Start: 2022-10-17

## 2022-10-17 RX ORDER — LEVOTHYROXINE SODIUM 0.15 MG/1
TABLET ORAL
COMMUNITY
Start: 2022-10-09

## 2022-10-17 RX ORDER — FLUTICASONE PROPIONATE 50 MCG
2 SPRAY, SUSPENSION (ML) NASAL DAILY
Qty: 11.1 ML | Refills: 0 | Status: SHIPPED | OUTPATIENT
Start: 2022-10-17 | End: 2023-10-12

## 2023-03-10 ENCOUNTER — LAB ENCOUNTER (OUTPATIENT)
Dept: LAB | Age: 68
End: 2023-03-10
Attending: FAMILY MEDICINE
Payer: MEDICARE

## 2023-03-10 DIAGNOSIS — E78.5 HYPERLIPIDEMIA, UNSPECIFIED HYPERLIPIDEMIA TYPE: ICD-10-CM

## 2023-03-10 DIAGNOSIS — R73.03 PREDIABETES: ICD-10-CM

## 2023-03-10 LAB
ALBUMIN SERPL-MCNC: 3.2 G/DL (ref 3.4–5)
ALBUMIN/GLOB SERPL: 0.8 {RATIO} (ref 1–2)
ALP LIVER SERPL-CCNC: 67 U/L
ALT SERPL-CCNC: 26 U/L
ANION GAP SERPL CALC-SCNC: 5 MMOL/L (ref 0–18)
AST SERPL-CCNC: 17 U/L (ref 15–37)
BILIRUB SERPL-MCNC: 0.3 MG/DL (ref 0.1–2)
BUN BLD-MCNC: 20 MG/DL (ref 7–18)
CALCIUM BLD-MCNC: 9.3 MG/DL (ref 8.5–10.1)
CHLORIDE SERPL-SCNC: 106 MMOL/L (ref 98–112)
CHOLEST SERPL-MCNC: 138 MG/DL (ref ?–200)
CO2 SERPL-SCNC: 29 MMOL/L (ref 21–32)
CREAT BLD-MCNC: 0.72 MG/DL
CREAT UR-SCNC: 113 MG/DL
EST. AVERAGE GLUCOSE BLD GHB EST-MCNC: 123 MG/DL (ref 68–126)
FASTING PATIENT LIPID ANSWER: YES
FASTING STATUS PATIENT QL REPORTED: YES
GFR SERPLBLD BASED ON 1.73 SQ M-ARVRAT: 90 ML/MIN/1.73M2 (ref 60–?)
GLOBULIN PLAS-MCNC: 4.2 G/DL (ref 2.8–4.4)
GLUCOSE BLD-MCNC: 110 MG/DL (ref 70–99)
HBA1C MFR BLD: 5.9 % (ref ?–5.7)
HDLC SERPL-MCNC: 46 MG/DL (ref 40–59)
LDLC SERPL CALC-MCNC: 65 MG/DL (ref ?–100)
MICROALBUMIN UR-MCNC: 1.66 MG/DL
MICROALBUMIN/CREAT 24H UR-RTO: 14.7 UG/MG (ref ?–30)
NONHDLC SERPL-MCNC: 92 MG/DL (ref ?–130)
OSMOLALITY SERPL CALC.SUM OF ELEC: 293 MOSM/KG (ref 275–295)
POTASSIUM SERPL-SCNC: 4 MMOL/L (ref 3.5–5.1)
PROT SERPL-MCNC: 7.4 G/DL (ref 6.4–8.2)
SODIUM SERPL-SCNC: 140 MMOL/L (ref 136–145)
TRIGL SERPL-MCNC: 161 MG/DL (ref 30–149)
VLDLC SERPL CALC-MCNC: 24 MG/DL (ref 0–30)

## 2023-03-10 PROCEDURE — 82043 UR ALBUMIN QUANTITATIVE: CPT

## 2023-03-10 PROCEDURE — 80053 COMPREHEN METABOLIC PANEL: CPT

## 2023-03-10 PROCEDURE — 80061 LIPID PANEL: CPT

## 2023-03-10 PROCEDURE — 83036 HEMOGLOBIN GLYCOSYLATED A1C: CPT

## 2023-03-10 PROCEDURE — 36415 COLL VENOUS BLD VENIPUNCTURE: CPT

## 2023-03-10 PROCEDURE — 82570 ASSAY OF URINE CREATININE: CPT

## 2023-03-13 ENCOUNTER — OFFICE VISIT (OUTPATIENT)
Dept: FAMILY MEDICINE CLINIC | Facility: CLINIC | Age: 68
End: 2023-03-13
Payer: MEDICARE

## 2023-03-13 VITALS
HEIGHT: 63.5 IN | TEMPERATURE: 97 F | WEIGHT: 269 LBS | RESPIRATION RATE: 14 BRPM | SYSTOLIC BLOOD PRESSURE: 106 MMHG | BODY MASS INDEX: 47.07 KG/M2 | DIASTOLIC BLOOD PRESSURE: 88 MMHG | HEART RATE: 80 BPM | OXYGEN SATURATION: 97 %

## 2023-03-13 DIAGNOSIS — R73.03 PREDIABETES: ICD-10-CM

## 2023-03-13 DIAGNOSIS — I10 ESSENTIAL HYPERTENSION: ICD-10-CM

## 2023-03-13 DIAGNOSIS — E78.5 HYPERLIPIDEMIA, UNSPECIFIED HYPERLIPIDEMIA TYPE: ICD-10-CM

## 2023-03-13 DIAGNOSIS — M53.86 SCIATICA OF RIGHT SIDE ASSOCIATED WITH DISORDER OF LUMBAR SPINE: ICD-10-CM

## 2023-03-13 DIAGNOSIS — Z12.31 SCREENING MAMMOGRAM FOR BREAST CANCER: Primary | ICD-10-CM

## 2023-03-13 PROCEDURE — 99213 OFFICE O/P EST LOW 20 MIN: CPT | Performed by: FAMILY MEDICINE

## 2023-03-13 RX ORDER — LISINOPRIL 10 MG/1
10 TABLET ORAL DAILY
Qty: 90 TABLET | Refills: 3 | Status: SHIPPED | OUTPATIENT
Start: 2023-03-13

## 2023-03-13 RX ORDER — HYDROCHLOROTHIAZIDE 12.5 MG/1
12.5 TABLET ORAL DAILY
Qty: 90 TABLET | Refills: 3 | Status: SHIPPED | OUTPATIENT
Start: 2023-03-13

## 2023-03-13 RX ORDER — SIMVASTATIN 20 MG
20 TABLET ORAL DAILY
Qty: 90 TABLET | Refills: 3 | Status: SHIPPED | OUTPATIENT
Start: 2023-03-13

## 2023-03-20 ENCOUNTER — TELEPHONE (OUTPATIENT)
Dept: PHYSICAL THERAPY | Facility: HOSPITAL | Age: 68
End: 2023-03-20

## 2023-03-21 ENCOUNTER — OFFICE VISIT (OUTPATIENT)
Dept: PHYSICAL THERAPY | Age: 68
End: 2023-03-21
Attending: FAMILY MEDICINE
Payer: MEDICARE

## 2023-03-21 DIAGNOSIS — R26.2 DIFFICULTY WALKING: ICD-10-CM

## 2023-03-21 DIAGNOSIS — M25.561 CHRONIC PAIN OF RIGHT KNEE: ICD-10-CM

## 2023-03-21 DIAGNOSIS — G89.29 CHRONIC RIGHT-SIDED LOW BACK PAIN, UNSPECIFIED WHETHER SCIATICA PRESENT: Primary | ICD-10-CM

## 2023-03-21 DIAGNOSIS — G89.29 CHRONIC PAIN OF RIGHT KNEE: ICD-10-CM

## 2023-03-21 DIAGNOSIS — M54.50 CHRONIC RIGHT-SIDED LOW BACK PAIN, UNSPECIFIED WHETHER SCIATICA PRESENT: Primary | ICD-10-CM

## 2023-03-21 PROCEDURE — 97162 PT EVAL MOD COMPLEX 30 MIN: CPT

## 2023-03-21 PROCEDURE — 97110 THERAPEUTIC EXERCISES: CPT

## 2023-03-22 NOTE — PROGRESS NOTES
PT DAILY NOTE  Diagnosis:   Chronic right-sided low back pain unspecified sciatica (M54.50, G89.29), chronic right knee pain (M25.561), difficulty walking (R26.2) Referring Provider: Krystal Garzon  Date of Evaluation:    3/21/2023    Precautions:  Cancer Next MD visit:   none scheduled  Date of Surgery: n/a  Symptom onset: chronic, worst past 3 months     Insurance Primary/Secondary: MEDICARE     Visit 2 of 8   Date POC Expires: 6-19-23       Subjective: Pt states she has been sitting a lot this morning, after which she usually gets soreness/stiffness. It is mostly in the Rt knee (points to posterolateral aspect) and Rt buttock. Pain: 4/10   @eval: Malcom Crowley is a 77year old female who presents to therapy today with complaints of LBP, mostly on Rt side, to lateral hip and down leg since last year, but worse past 3 months. \"My knee is messed up too. \" She is taking Arthritis strength Tylenol at night, and Alleve for pain management. Her Rt knee swelled up in November-December, after twisting a certain way, felt a pop. Her pain increases c laying on Rt side. After transferring sit to stand, she has to take her time to feel the Rt knee is stable enough to start walking. Has been avoiding stairs 2o painful and difficult. She states for years she has adapted how she does stairs, she doesn't feel confident enough to step down onto RLE. Back pain is worse c sitting, especially hard kitchen chair. Sitting tolerance up to a couple hours depending on surface. Uses a grocery cart to lean on when shopping, up to an hour. Walking tolerance without A.D. is probably limited to 15 min. Rt knee pain 2-4/10. Pt describes back pain level at best 2/10, at worst 8/10. Current functional limitations include decr walking tolerance, difficulty/avoiding stairs, decreased sitting tolerance, painful laying on Rt side. Rickey Zamora describes prior level of function better mobility c less pain.  Pt goals include would like to go on walks/do more things c grandkids, garden. Past medical history was reviewed with Natalia Saenz. Significant findings include Allergic rhinitis, Arthritis, Blood disorder, Cancer ([facility] [address] Depression (2/27/2018), Exposure to medical diagnostic radiation, High cholesterol, Hypertension, Hypothyroidism, Iritis, Leaking of urine (2010), Nausea (2002), Obesity, Pain in joints,  Thrombocytopenia ([facility] [address] (2/27/2018), Uterine cancer ([facility] [address] osteopenia. Pt denies diplopia, dysarthria, dysphasia, dizziness, drop attacks, bowel/bladder changes, saddle anesthesia, and CRYS LE N/T. Objective:   Pt continues to ambulate c slightly Rt lateral trunk flexion, added sideglides to Lf today, which she tolerated s c/o  TTP throughout Rt glutes/piriformis, added STM today    Treatment:  (\"NP\" indicates Not Performed this date)  Manual Therapy: Added STM medial Rt knee joint  Added AP/PA femorotibial joint gr 2  Added FR to Rt ITB  Added STM c ball to Rt glutes/pirirformis in S/L    Neuromuscular Re-education:    Therapeutic Exercise: Added Nustep for CKC strengthening and knee/hip ROM 5min  Rt quad set c TR 5\"x10  Rt clamshell 5\"x10  S/L Rt hip abd 2x10  Added Supine BKTC c SB 3\"x2min  Seated Rt HS stretch 20\"x3  Added Rt LAQ 5\"x10  Added Sit to stand 1x10  Added sidestepping 1min  Added sideglides Rt side to wall 3\"x10    HEP:   Initial: Rt quad set, clamshell, S/L hip abd, seated HS stretch  3-23: add LAQ    Assessment/Plan: Pt reports HEP going well thus far. Her pain c/o are Rt knee and posterior hip, and she has tightness and TTP throughout Rt glutes and piriformis. Addressed tightness c manual tx today as well as supported by hip and quad strengthening. While she is not c/o back pain today, she does ambulate c Rt lateral trunk lean, so added sideglides today to open Rt facets which could help alleviate areas of tightness in Rt hip/thigh. No pain c/o. Monitor this and consider adding to future HEP. For today only add LAQ to HEP.  Cont to address deficits to work toward PLOF and set goals. PLAN OF CARE:    Goals: (to be met in 8 visits)   1. Consistently decr pain < or = 2/10 intermittent for incr QOL and activity tolerance  2. Overall incr in function as indicated by decr Oswestry at least 10 pts  3. incr RLE MMT at least 1/2 grade painfree throughout for incr mm support to Rt knee and lower chain to decr stress on back and improve quality of gait  4. Pt able to lay on Rt side s pain to indicate decr irritability  5. Pt able to transfer sit to stand c little to no pain and transition to ambulation s apprehension or difficulty for PLOF  6. Pt able to squat painfree for incr ease of transfers, stairs  7. Pt able to ascend/descend stairs c good form and control c little to no pain for PLOF  8. Pt tolerates sitting c proper low back support for at least 1 hour s LBP for PLOF  9. Pt able to go grocery shopping s reliance on leaning on cart c little to no pain for PLOF  10. indep HEP to promote cont progress toward functional goals    Frequency / Duration: Patient will be seen for 2 x/week or a total of 8 visits over a 90 day period. All Treatments performed at distinct and separate times during the therapy session.   Treatment Minutes  Units charged   Manual Therapy 15 minutes 1   Therapeutic Activity 0 minutes    Neuromuscular Re-education 0 minutes    Therapeutic Exercise 30 minutes 2   Total Direct Treatment Time 45 minutes

## 2023-03-23 ENCOUNTER — OFFICE VISIT (OUTPATIENT)
Dept: PHYSICAL THERAPY | Age: 68
End: 2023-03-23
Attending: FAMILY MEDICINE
Payer: MEDICARE

## 2023-03-23 DIAGNOSIS — R26.2 DIFFICULTY WALKING: ICD-10-CM

## 2023-03-23 DIAGNOSIS — M25.561 CHRONIC PAIN OF RIGHT KNEE: ICD-10-CM

## 2023-03-23 DIAGNOSIS — G89.29 CHRONIC PAIN OF RIGHT KNEE: ICD-10-CM

## 2023-03-23 DIAGNOSIS — M54.50 CHRONIC RIGHT-SIDED LOW BACK PAIN, UNSPECIFIED WHETHER SCIATICA PRESENT: Primary | ICD-10-CM

## 2023-03-23 DIAGNOSIS — G89.29 CHRONIC RIGHT-SIDED LOW BACK PAIN, UNSPECIFIED WHETHER SCIATICA PRESENT: Primary | ICD-10-CM

## 2023-03-23 PROCEDURE — 97110 THERAPEUTIC EXERCISES: CPT

## 2023-03-23 PROCEDURE — 97140 MANUAL THERAPY 1/> REGIONS: CPT

## 2023-03-28 NOTE — PROGRESS NOTES
PT DAILY NOTE  Diagnosis:   Chronic right-sided low back pain unspecified sciatica (M54.50, G89.29), chronic right knee pain (M25.561), difficulty walking (R26.2) Referring Provider: Castillo Rapp  Date of Evaluation:    3/21/2023    Precautions:  Cancer Next MD visit:   none scheduled  Date of Surgery: n/a  Symptom onset: chronic, worst past 3 months     Insurance Primary/Secondary: MEDICARE     Visit 3 of 8   Date POC Expires: 6-19-23       Subjective: Pt states she could tell she worked out after last visit, but 'a good pain'. No back pain today, just Rt knee stiffness. She is generally tired from cleaning out and painting a room the last few days. Pain: 3/10   @eval: Allegra Adair is a 77year old female who presents to therapy today with complaints of LBP, mostly on Rt side, to lateral hip and down leg since last year, but worse past 3 months. \"My knee is messed up too. \" She is taking Arthritis strength Tylenol at night, and Alleve for pain management. Her Rt knee swelled up in November-December, after twisting a certain way, felt a pop. Her pain increases c laying on Rt side. After transferring sit to stand, she has to take her time to feel the Rt knee is stable enough to start walking. Has been avoiding stairs 2o painful and difficult. She states for years she has adapted how she does stairs, she doesn't feel confident enough to step down onto RLE. Back pain is worse c sitting, especially hard kitchen chair. Sitting tolerance up to a couple hours depending on surface. Uses a grocery cart to lean on when shopping, up to an hour. Walking tolerance without A.D. is probably limited to 15 min. Rt knee pain 2-4/10. Pt describes back pain level at best 2/10, at worst 8/10. Current functional limitations include decr walking tolerance, difficulty/avoiding stairs, decreased sitting tolerance, painful laying on Rt side. Damien Cavazos describes prior level of function better mobility c less pain.  Pt goals include would like to go on walks/do more things c grandkids, garden. Past medical history was reviewed with Porfirio Lord. Significant findings include Allergic rhinitis, Arthritis, Blood disorder, Cancer ([facility] [address] Depression (2/27/2018), Exposure to medical diagnostic radiation, High cholesterol, Hypertension, Hypothyroidism, Iritis, Leaking of urine (2010), Nausea (2002), Obesity, Pain in joints,  Thrombocytopenia ([facility] [address] (2/27/2018), Uterine cancer ([facility] [address] osteopenia. Pt denies diplopia, dysarthria, dysphasia, dizziness, drop attacks, bowel/bladder changes, saddle anesthesia, and CRYS LE N/T. Objective:   Pt reports Rt hip discomfort today c BKTC c ball, added inferolateral Rt hip distraction c belt  She continues to feel 'tightness' c sit to stand, attributes to Rt groin/inner thigh - added standing hip adductor stretch  Her Rt knee pain is at lateral joint line    Treatment:  (\"NP\" indicates Not Performed this date)  Manual Therapy:  STM medial Rt knee joint-NP  AP/PA femorotibial joint gr 2-NP  FR to Rt ITB  STM c ball to Rt glutes/pirirformis in S/L  Added Rt hip inferolateral distraction c belt 4min    Neuromuscular Re-education:    Therapeutic Exercise:  Nustep for CKC strengthening and knee/hip ROM 5min  Seated Rt HS stretch 20\"x3  Rt quad set c TR 5\"x10  Rt clamshell 5\"x10  S/L Rt hip abd 2x10  Supine BKTC c SB 3\"x2min  Added hooklying Rt figure 4 stretch 15\"x3  Rt LAQ 5\"x10  Sit to stand 1x10  Added standing Rt adductor stretch/lunge at table 10'x4  sidestepping 1min  sideglides Rt side to wall 3\"x10  Added standing hip abd 2x10 ea Rt, Lf   Added Rt hipflexor stretch on 6+4\" step 15\"x3    HEP:   Initial: Rt quad set, clamshell, S/L hip abd, seated HS stretch  3-23: add LAQ  3-29: add hooklying figure 4 stretch    Assessment/Plan: Pt reports lateral Rt knee pain today, no back pain.  During session she reports Rt groin/anterior/lateral hip pain c hip flexion, thus added manual inferolateral hip distraction c belt, hooklying figure 4 stretch as well as standing hip adductor stretch to try to address this tightness. She tolerated these as well as additional hip strengthening exercise s c/o. Cont to address deficits. PLAN OF CARE:    Goals: (to be met in 8 visits)   1. Consistently decr pain < or = 2/10 intermittent for incr QOL and activity tolerance  2. Overall incr in function as indicated by decr Oswestry at least 10 pts  3. incr RLE MMT at least 1/2 grade painfree throughout for incr mm support to Rt knee and lower chain to decr stress on back and improve quality of gait  4. Pt able to lay on Rt side s pain to indicate decr irritability  5. Pt able to transfer sit to stand c little to no pain and transition to ambulation s apprehension or difficulty for PLOF  6. Pt able to squat painfree for incr ease of transfers, stairs  7. Pt able to ascend/descend stairs c good form and control c little to no pain for PLOF  8. Pt tolerates sitting c proper low back support for at least 1 hour s LBP for PLOF  9. Pt able to go grocery shopping s reliance on leaning on cart c little to no pain for PLOF  10. indep HEP to promote cont progress toward functional goals    Frequency / Duration: Patient will be seen for 2 x/week or a total of 8 visits over a 90 day period. All Treatments performed at distinct and separate times during the therapy session.   Treatment Minutes  Units charged   Manual Therapy 12 minutes 1   Therapeutic Activity 0 minutes    Neuromuscular Re-education 0 minutes    Therapeutic Exercise 33 minutes 2   Total Direct Treatment Time 45 minutes

## 2023-03-29 ENCOUNTER — OFFICE VISIT (OUTPATIENT)
Dept: PHYSICAL THERAPY | Age: 68
End: 2023-03-29
Attending: FAMILY MEDICINE
Payer: MEDICARE

## 2023-03-29 DIAGNOSIS — M25.561 CHRONIC PAIN OF RIGHT KNEE: ICD-10-CM

## 2023-03-29 DIAGNOSIS — M54.50 CHRONIC RIGHT-SIDED LOW BACK PAIN, UNSPECIFIED WHETHER SCIATICA PRESENT: Primary | ICD-10-CM

## 2023-03-29 DIAGNOSIS — G89.29 CHRONIC RIGHT-SIDED LOW BACK PAIN, UNSPECIFIED WHETHER SCIATICA PRESENT: Primary | ICD-10-CM

## 2023-03-29 DIAGNOSIS — G89.29 CHRONIC PAIN OF RIGHT KNEE: ICD-10-CM

## 2023-03-29 DIAGNOSIS — R26.2 DIFFICULTY WALKING: ICD-10-CM

## 2023-03-29 PROCEDURE — 97140 MANUAL THERAPY 1/> REGIONS: CPT

## 2023-03-29 PROCEDURE — 97110 THERAPEUTIC EXERCISES: CPT

## 2023-03-30 NOTE — PROGRESS NOTES
PT DAILY NOTE  Diagnosis:   Chronic right-sided low back pain unspecified sciatica (M54.50, G89.29), chronic right knee pain (M25.561), difficulty walking (R26.2) Referring Provider: Niurka Brown  Date of Evaluation:    3/21/2023    Precautions:  Cancer Next MD visit:   none scheduled  Date of Surgery: n/a  Symptom onset: chronic, worst past 3 months     Insurance Primary/Secondary: MEDICARE     Visit 4 of 8   Date POC Expires: 6-19-23       Subjective: Pt states she does feel improvement in her hip pain since last visit. She still feels some stiffness in the Rt knee and back, but minimal today. Her pain is less going up/down stairs and she is able to do them more quickly. Pain: 2/10   @eval: Concetta Medina is a 77year old female who presents to therapy today with complaints of LBP, mostly on Rt side, to lateral hip and down leg since last year, but worse past 3 months. \"My knee is messed up too. \" She is taking Arthritis strength Tylenol at night, and Alleve for pain management. Her Rt knee swelled up in November-December, after twisting a certain way, felt a pop. Her pain increases c laying on Rt side. After transferring sit to stand, she has to take her time to feel the Rt knee is stable enough to start walking. Has been avoiding stairs 2o painful and difficult. She states for years she has adapted how she does stairs, she doesn't feel confident enough to step down onto RLE. Back pain is worse c sitting, especially hard kitchen chair. Sitting tolerance up to a couple hours depending on surface. Uses a grocery cart to lean on when shopping, up to an hour. Walking tolerance without A.D. is probably limited to 15 min. Rt knee pain 2-4/10. Pt describes back pain level at best 2/10, at worst 8/10. Current functional limitations include decr walking tolerance, difficulty/avoiding stairs, decreased sitting tolerance, painful laying on Rt side.    Froy Ruiz describes prior level of function better mobility c less pain. Pt goals include would like to go on walks/do more things c grandkids, garden. Past medical history was reviewed with Vani Osorio. Significant findings include Allergic rhinitis, Arthritis, Blood disorder, Cancer ([facility] [address] Depression (2/27/2018), Exposure to medical diagnostic radiation, High cholesterol, Hypertension, Hypothyroidism, Iritis, Leaking of urine (2010), Nausea (2002), Obesity, Pain in joints,  Thrombocytopenia ([facility] [address] (2/27/2018), Uterine cancer ([facility] [address] osteopenia. Pt denies diplopia, dysarthria, dysphasia, dizziness, drop attacks, bowel/bladder changes, saddle anesthesia, and CRYS LE N/T. Objective:     Treatment:  (\"NP\" indicates Not Performed this date)  Manual Therapy:  STM medial Rt knee joint-NP  AP/PA femorotibial joint gr 2-NP  FR to Rt ITB  STM c ball to Rt glutes/pirirformis in S/L  Rt hip inferolateral distraction c belt 4min    Neuromuscular Re-education:    Therapeutic Exercise:  Nustep for CKC strengthening and knee/hip ROM 5min  standing Rt adductor stretch/lunge at table 10'x3  Seated Rt HS stretch 20\"x3  Rt quad set c TR 5\"x15 (increased)  Rt clamshell 5\"x10  S/L Rt hip abd 2x10  Supine BKTC c SB 3\"x2min  hooklying Rt figure 4 stretch 15\"x3  Rt LAQ 5\"x10  Sit to stand 2x10 (increased)  sidestepping 2min (increased)  sideglides Rt side to wall 3\"x10  standing hip abd 2x10 ea Rt, Lf   Rt hipflexor stretch on 6+4\" step 15\"x3  Added fwd stepup 4\" 1x10 ea Rt, Lf     HEP:   Initial: Rt quad set, clamshell, S/L hip abd, seated HS stretch  3-23: add LAQ  3-29: add hooklying figure 4 stretch    Assessment/Plan: Pt reports decr pain and incr ease c stairs at home since starting therapy She tolerated several progressed ex's today to further challenge her functional strength, c fatigue but no incr pain reported. Cont to progress as tolerated. PLAN OF CARE:    Goals: (to be met in 8 visits)   1. Consistently decr pain < or = 2/10 intermittent for incr QOL and activity tolerance  2.  Overall incr in function as indicated by decr Oswestry at least 10 pts  3. incr RLE MMT at least 1/2 grade painfree throughout for incr mm support to Rt knee and lower chain to decr stress on back and improve quality of gait  4. Pt able to lay on Rt side s pain to indicate decr irritability  5. Pt able to transfer sit to stand c little to no pain and transition to ambulation s apprehension or difficulty for PLOF  6. Pt able to squat painfree for incr ease of transfers, stairs  7. Pt able to ascend/descend stairs c good form and control c little to no pain for PLOF  8. Pt tolerates sitting c proper low back support for at least 1 hour s LBP for PLOF  9. Pt able to go grocery shopping s reliance on leaning on cart c little to no pain for PLOF  10. indep HEP to promote cont progress toward functional goals    Frequency / Duration: Patient will be seen for 2 x/week or a total of 8 visits over a 90 day period. All Treatments performed at distinct and separate times during the therapy session.   Treatment Minutes  Units charged   Manual Therapy 10 minutes 1   Therapeutic Activity 0 minutes    Neuromuscular Re-education 0 minutes    Therapeutic Exercise 35 minutes 2   Total Direct Treatment Time 45 minutes

## 2023-03-31 ENCOUNTER — OFFICE VISIT (OUTPATIENT)
Dept: PHYSICAL THERAPY | Age: 68
End: 2023-03-31
Attending: FAMILY MEDICINE
Payer: MEDICARE

## 2023-03-31 DIAGNOSIS — R26.2 DIFFICULTY WALKING: ICD-10-CM

## 2023-03-31 DIAGNOSIS — M54.50 CHRONIC RIGHT-SIDED LOW BACK PAIN, UNSPECIFIED WHETHER SCIATICA PRESENT: Primary | ICD-10-CM

## 2023-03-31 DIAGNOSIS — G89.29 CHRONIC PAIN OF RIGHT KNEE: ICD-10-CM

## 2023-03-31 DIAGNOSIS — M25.561 CHRONIC PAIN OF RIGHT KNEE: ICD-10-CM

## 2023-03-31 DIAGNOSIS — G89.29 CHRONIC RIGHT-SIDED LOW BACK PAIN, UNSPECIFIED WHETHER SCIATICA PRESENT: Primary | ICD-10-CM

## 2023-03-31 PROCEDURE — 97110 THERAPEUTIC EXERCISES: CPT

## 2023-03-31 PROCEDURE — 97140 MANUAL THERAPY 1/> REGIONS: CPT

## 2023-03-31 NOTE — PROGRESS NOTES
PT DAILY NOTE  Diagnosis:   Chronic right-sided low back pain unspecified sciatica (M54.50, G89.29), chronic right knee pain (M25.561), difficulty walking (R26.2) Referring Provider: Suzi Cisse  Date of Evaluation:    3/21/2023    Precautions:  Cancer Next MD visit:   none scheduled  Date of Surgery: n/a  Symptom onset: chronic, worst past 3 months     Insurance Primary/Secondary: MEDICARE     Visit 5 of 8   Date POC Expires: 6-19-23       Subjective: Pt states she has no back or knee pain today, occasional stiffness, especially after prolonged sitting. She is able to lay on her side in bed easier, now, but she does feel more stiff if she stays in that position for a long period. Pain: 0/10   @eval: Joann Rosales is a 77year old female who presents to therapy today with complaints of LBP, mostly on Rt side, to lateral hip and down leg since last year, but worse past 3 months. \"My knee is messed up too. \" She is taking Arthritis strength Tylenol at night, and Alleve for pain management. Her Rt knee swelled up in November-December, after twisting a certain way, felt a pop. Her pain increases c laying on Rt side. After transferring sit to stand, she has to take her time to feel the Rt knee is stable enough to start walking. Has been avoiding stairs 2o painful and difficult. She states for years she has adapted how she does stairs, she doesn't feel confident enough to step down onto RLE. Back pain is worse c sitting, especially hard kitchen chair. Sitting tolerance up to a couple hours depending on surface. Uses a grocery cart to lean on when shopping, up to an hour. Walking tolerance without A.D. is probably limited to 15 min. Rt knee pain 2-4/10. Pt describes back pain level at best 2/10, at worst 8/10. Current functional limitations include decr walking tolerance, difficulty/avoiding stairs, decreased sitting tolerance, painful laying on Rt side.    Josse Mendoza describes prior level of function better mobility c less pain. Pt goals include would like to go on walks/do more things c grandkids, garden. Past medical history was reviewed with Geovanna Lowe. Significant findings include Allergic rhinitis, Arthritis, Blood disorder, Cancer ([facility] [address] Depression (2/27/2018), Exposure to medical diagnostic radiation, High cholesterol, Hypertension, Hypothyroidism, Iritis, Leaking of urine (2010), Nausea (2002), Obesity, Pain in joints,  Thrombocytopenia ([facility] [address] (2/27/2018), Uterine cancer ([facility] [address] osteopenia. Pt denies diplopia, dysarthria, dysphasia, dizziness, drop attacks, bowel/bladder changes, saddle anesthesia, and CRYS LE N/T. Objective:   Pt tolerated lateral stepups s c/o pain    Treatment:  (\"NP\" indicates Not Performed this date)  Manual Therapy:  FR to Rt ITB  STM c ball to Rt glutes/pirirformis in S/L  Rt hip inferolateral distraction c belt 4min    Neuromuscular Re-education:    Therapeutic Exercise:  Nustep for CKC strengthening and knee/hip ROM 5min  standing Rt adductor stretch/lunge at table 10'x3  Seated Rt HS stretch 20\"x3  Rt quad set c TR 5\"x15  hooklying Rt figure 4 stretch 15\"x3  Rt clamshell 5\"x15 (increased)  S/L Rt hip abd 2x10  Supine BKTC c SB 3\"x2min  Rt LAQ 1#  5\"x15 (increased)  Sit to stand 2x10  sidestepping 2min (increased)  sideglides Rt side to wall 3\"x10  standing hip abd 2x10 ea Rt, Lf   Added standing hip ext 2x10 ea Rt, Lf   Rt hipflexor stretch on 6+4\" step 15\"x3  fwd stepup 4\" 1x10 ea Rt, Lf   Added lat stepup 4\" 1x10 ea Rt, Lf     HEP:   Initial: Rt quad set, clamshell, S/L hip abd, seated HS stretch  3-23: add LAQ  3-29: add hooklying figure 4 stretch    Assessment/Plan: Pt reports painfree past few days for first time. She is finding it easier to lay on her Rt side though she does report 'stiffness' p extended duration. She tolerated new/progressed ex's s any pain c/o. Cont to progress RLE strength as tolerated. PLAN OF CARE:    Goals: (to be met in 8 visits)   1.  Consistently decr pain < or = 2/10 intermittent for incr QOL and activity tolerance  2. Overall incr in function as indicated by decr Oswestry at least 10 pts  3. incr RLE MMT at least 1/2 grade painfree throughout for incr mm support to Rt knee and lower chain to decr stress on back and improve quality of gait  4. Pt able to lay on Rt side s pain to indicate decr irritability  5. Pt able to transfer sit to stand c little to no pain and transition to ambulation s apprehension or difficulty for PLOF  6. Pt able to squat painfree for incr ease of transfers, stairs  7. Pt able to ascend/descend stairs c good form and control c little to no pain for PLOF  8. Pt tolerates sitting c proper low back support for at least 1 hour s LBP for PLOF  9. Pt able to go grocery shopping s reliance on leaning on cart c little to no pain for PLOF  10. indep HEP to promote cont progress toward functional goals    Frequency / Duration: Patient will be seen for 2 x/week or a total of 8 visits over a 90 day period. All Treatments performed at distinct and separate times during the therapy session.   Treatment Minutes  Units charged   Manual Therapy 10 minutes 1   Therapeutic Activity 0 minutes    Neuromuscular Re-education 0 minutes    Therapeutic Exercise 35 minutes 2   Total Direct Treatment Time 45 minutes

## 2023-04-03 ENCOUNTER — OFFICE VISIT (OUTPATIENT)
Dept: PHYSICAL THERAPY | Age: 68
End: 2023-04-03
Attending: FAMILY MEDICINE
Payer: MEDICARE

## 2023-04-03 DIAGNOSIS — G89.29 CHRONIC RIGHT-SIDED LOW BACK PAIN, UNSPECIFIED WHETHER SCIATICA PRESENT: Primary | ICD-10-CM

## 2023-04-03 DIAGNOSIS — G89.29 CHRONIC PAIN OF RIGHT KNEE: ICD-10-CM

## 2023-04-03 DIAGNOSIS — R26.2 DIFFICULTY WALKING: ICD-10-CM

## 2023-04-03 DIAGNOSIS — M25.561 CHRONIC PAIN OF RIGHT KNEE: ICD-10-CM

## 2023-04-03 DIAGNOSIS — M54.50 CHRONIC RIGHT-SIDED LOW BACK PAIN, UNSPECIFIED WHETHER SCIATICA PRESENT: Primary | ICD-10-CM

## 2023-04-03 PROCEDURE — 97110 THERAPEUTIC EXERCISES: CPT

## 2023-04-03 PROCEDURE — 97140 MANUAL THERAPY 1/> REGIONS: CPT

## 2023-04-05 NOTE — PROGRESS NOTES
PT DAILY NOTE  Diagnosis:   Chronic right-sided low back pain unspecified sciatica (M54.50, G89.29), chronic right knee pain (M25.561), difficulty walking (R26.2) Referring Provider: Jackie Flanagan  Date of Evaluation:    3/21/2023    Precautions:  Cancer Next MD visit:   none scheduled  Date of Surgery: n/a  Symptom onset: chronic, worst past 3 months     Insurance Primary/Secondary: MEDICARE     Visit 6 of 8   Date POC Expires: 6-19-23       Subjective: Pt states she can tell the weather has gotten cold again, \"everything aches. \"   Pain: 3-4/10   @eval: Derian Rockwell is a 77year old female who presents to therapy today with complaints of LBP, mostly on Rt side, to lateral hip and down leg since last year, but worse past 3 months. \"My knee is messed up too. \" She is taking Arthritis strength Tylenol at night, and Alleve for pain management. Her Rt knee swelled up in November-December, after twisting a certain way, felt a pop. Her pain increases c laying on Rt side. After transferring sit to stand, she has to take her time to feel the Rt knee is stable enough to start walking. Has been avoiding stairs 2o painful and difficult. She states for years she has adapted how she does stairs, she doesn't feel confident enough to step down onto RLE. Back pain is worse c sitting, especially hard kitchen chair. Sitting tolerance up to a couple hours depending on surface. Uses a grocery cart to lean on when shopping, up to an hour. Walking tolerance without A.D. is probably limited to 15 min. Rt knee pain 2-4/10. Pt describes back pain level at best 2/10, at worst 8/10. Current functional limitations include decr walking tolerance, difficulty/avoiding stairs, decreased sitting tolerance, painful laying on Rt side. Nanette Branch describes prior level of function better mobility c less pain. Pt goals include would like to go on walks/do more things c grandkids, garden. Past medical history was reviewed with Nanette Branch. Significant findings include Allergic rhinitis, Arthritis, Blood disorder, Cancer ([facility] [address] Depression (2/27/2018), Exposure to medical diagnostic radiation, High cholesterol, Hypertension, Hypothyroidism, Iritis, Leaking of urine (2010), Nausea (2002), Obesity, Pain in joints,  Thrombocytopenia ([facility] [address] (2/27/2018), Uterine cancer ([facility] [address] osteopenia. Pt denies diplopia, dysarthria, dysphasia, dizziness, drop attacks, bowel/bladder changes, saddle anesthesia, and CRYS LE N/T. Objective:   Held hip distraction today as pt's symptoms are 1o knee  Held stepups today 2o incr soreness in Rt knee    Treatment:  (\"NP\" indicates Not Performed this date)  Manual Therapy:  FR to Rt ITB  STM c ball to Rt glutes/pirirformis in S/L  Rt hip inferolateral distraction c belt 4min-NP    Neuromuscular Re-education:    Therapeutic Exercise:  Nustep for CKC strengthening and knee/hip ROM 5min  standing Rt adductor stretch/lunge at table 10'x3  Seated Rt HS stretch 20\"x3  Rt SAQ 5\"x15 (increased)  hooklying Rt figure 4 stretch 15\"x3  Rt clamshell 5\"x15  S/L Rt hip abd 2x10  Supine BKTC c SB 3\"x2min  Rt LAQ 1#  5\"x15  Sit to stand 2x10  sideglides Rt side to wall 3\"x10 (incr next session, consider adding standing core stab ex's)  sidestepping 2min  Rt hipflexor stretch on 6+4\" step 15\"x3  standing hip abd 2x10 ea Rt, Lf   standing hip ext 2x10 ea Rt, Lf   fwd stepup 4\" 1x10 ea Rt, Lf -NP  lat stepup 4\" 1x10 ea Rt, Lf -NP    HEP:   Initial: Rt quad set, clamshell, S/L hip abd, seated HS stretch  3-23: add LAQ  3-29: add hooklying figure 4 stretch    Assessment/Plan: Pt reports some incr pain today, localized to Rt knee, she attributes to the weather. She felt no worse between visits, so no adverse response to last session is suspected. She tolerated all exercise activities today c fatigue and no incr in pain, but we did hold stepups today 2o her achy knee. Expect pt will be able to tolerate this next session.  Continue to address deficits. PLAN OF CARE:    Goals: (to be met in 8 visits)   1. Consistently decr pain < or = 2/10 intermittent for incr QOL and activity tolerance  2. Overall incr in function as indicated by decr Oswestry at least 10 pts  3. incr RLE MMT at least 1/2 grade painfree throughout for incr mm support to Rt knee and lower chain to decr stress on back and improve quality of gait  4. Pt able to lay on Rt side s pain to indicate decr irritability  5. Pt able to transfer sit to stand c little to no pain and transition to ambulation s apprehension or difficulty for PLOF  6. Pt able to squat painfree for incr ease of transfers, stairs  7. Pt able to ascend/descend stairs c good form and control c little to no pain for PLOF  8. Pt tolerates sitting c proper low back support for at least 1 hour s LBP for PLOF  9. Pt able to go grocery shopping s reliance on leaning on cart c little to no pain for PLOF  10. indep HEP to promote cont progress toward functional goals    Frequency / Duration: Patient will be seen for 2 x/week or a total of 8 visits over a 90 day period. All Treatments performed at distinct and separate times during the therapy session.   Treatment Minutes  Units charged   Manual Therapy 8 minutes 1   Therapeutic Activity 0 minutes    Neuromuscular Re-education 0 minutes    Therapeutic Exercise 32 minutes 2   Total Direct Treatment Time 40 minutes

## 2023-04-06 ENCOUNTER — OFFICE VISIT (OUTPATIENT)
Dept: PHYSICAL THERAPY | Age: 68
End: 2023-04-06
Attending: FAMILY MEDICINE
Payer: MEDICARE

## 2023-04-06 DIAGNOSIS — R26.2 DIFFICULTY WALKING: ICD-10-CM

## 2023-04-06 DIAGNOSIS — G89.29 CHRONIC PAIN OF RIGHT KNEE: ICD-10-CM

## 2023-04-06 DIAGNOSIS — G89.29 CHRONIC RIGHT-SIDED LOW BACK PAIN, UNSPECIFIED WHETHER SCIATICA PRESENT: Primary | ICD-10-CM

## 2023-04-06 DIAGNOSIS — M54.50 CHRONIC RIGHT-SIDED LOW BACK PAIN, UNSPECIFIED WHETHER SCIATICA PRESENT: Primary | ICD-10-CM

## 2023-04-06 DIAGNOSIS — M25.561 CHRONIC PAIN OF RIGHT KNEE: ICD-10-CM

## 2023-04-06 PROCEDURE — 97140 MANUAL THERAPY 1/> REGIONS: CPT

## 2023-04-06 PROCEDURE — 97110 THERAPEUTIC EXERCISES: CPT

## 2023-04-07 NOTE — PROGRESS NOTES
PT DAILY NOTE  Diagnosis:   Chronic right-sided low back pain unspecified sciatica (M54.50, G89.29), chronic right knee pain (M25.561), difficulty walking (R26.2) Referring Provider: Niurka Brown  Date of Evaluation:    3/21/2023    Precautions:  Cancer Next MD visit:   none scheduled  Date of Surgery: n/a  Symptom onset: chronic, worst past 3 months     Insurance Primary/Secondary: MEDICARE     Visit 7 of 8   Date POC Expires: 6-19-23       Subjective: Pt states when she is just walking around the house, her knee doesn't bother her. She can really tell if she goes down stairs to do laundry a couple times, she knows she will be in pain the next day. The back hasn't bothered her lately. Pain: 0/10   @eval: Concetta Medina is a 77year old female who presents to therapy today with complaints of LBP, mostly on Rt side, to lateral hip and down leg since last year, but worse past 3 months. \"My knee is messed up too. \" She is taking Arthritis strength Tylenol at night, and Alleve for pain management. Her Rt knee swelled up in November-December, after twisting a certain way, felt a pop. Her pain increases c laying on Rt side. After transferring sit to stand, she has to take her time to feel the Rt knee is stable enough to start walking. Has been avoiding stairs 2o painful and difficult. She states for years she has adapted how she does stairs, she doesn't feel confident enough to step down onto RLE. Back pain is worse c sitting, especially hard kitchen chair. Sitting tolerance up to a couple hours depending on surface. Uses a grocery cart to lean on when shopping, up to an hour. Walking tolerance without A.D. is probably limited to 15 min. Rt knee pain 2-4/10. Pt describes back pain level at best 2/10, at worst 8/10. Current functional limitations include decr walking tolerance, difficulty/avoiding stairs, decreased sitting tolerance, painful laying on Rt side.    Froy Ruiz describes prior level of function better mobility c less pain. Pt goals include would like to go on walks/do more things c grandkids, garden. Past medical history was reviewed with Shasha Wang. Significant findings include Allergic rhinitis, Arthritis, Blood disorder, Cancer ([facility] [address] Depression (2/27/2018), Exposure to medical diagnostic radiation, High cholesterol, Hypertension, Hypothyroidism, Iritis, Leaking of urine (2010), Nausea (2002), Obesity, Pain in joints,  Thrombocytopenia ([facility] [address] (2/27/2018), Uterine cancer ([facility] [address] osteopenia. Pt denies diplopia, dysarthria, dysphasia, dizziness, drop attacks, bowel/bladder changes, saddle anesthesia, and CRYS LE N/T. Objective:   Reviewed avoiding Rt genu valgum, especially c up/down stairs, to decrease medial knee strain.   She does have painful endrange Rt knee flexion noted passively but s joint stiffness  Pt reports the initial 1-2 reps of sit to stand she gets some discomfort at anteromedial Rt knee but then improves as reps continue  No knee pain c stepups today  No knee pain c new leg press    Treatment:  (\"NP\" indicates Not Performed this date)  Manual Therapy:  FR to Rt ITB  STM c ball to Rt glutes/pirirformis in S/L  Rt hip inferolateral distraction c belt 4min-NP    Neuromuscular Re-education:    Therapeutic Exercise:  Nustep for CKC strengthening and knee/hip ROM 5min  standing Rt adductor stretch/lunge at table 10'x3  Seated Rt HS stretch 20\"x3  hooklying Rt figure 4 stretch 15\"x3  Rt SAQ 1# 5\"x20 (increased)  Rt clamshell 5\"x20 (increased)  S/L Rt hip abd 2x10 (increased)  Supine BKTC c SB 3\"x2min-NP  Rt LAQ 1#  5\"x15-NP  Sit to stand 2x10 c cueing to shift weight to heels to decr anterior knee stress  sideglides Rt side to wall 3\"x20 (increased)  sidestepping 2min  Rt hipflexor stretch on 6+4\" step 15\"x3  standing hip abd 2x10 ea Rt, Lf   standing hip ext 2x10 ea Rt, Lf   fwd stepup 4\" 1x10 ea Rt  lat stepup 4\" 1x10 ea Rt  added Rt leg press on shuttle 4-cords 2x10    HEP:   Initial: Rt quad set, eliza, S/L hip abd, seated HS stretch  3-23: add LAQ  3-29: add hooklying figure 4 stretch    Assessment/Plan: Pt consistently s LBP. Her Rt knee pain is intermittent, especially on stairs. C cueing to avoid genu valgum, she had no pain c 4\" stepups today, and she also had no pain c new exercise of Rt leg press on shuttle. Reassess next visit, review HEP c emphasis on hip abd and quad strengthening. Expect to transition to indep mgmt if no flareups. PLAN OF CARE:    Goals: (to be met in 8 visits)   1. Consistently decr pain < or = 2/10 intermittent for incr QOL and activity tolerance  2. Overall incr in function as indicated by decr Oswestry at least 10 pts  3. incr RLE MMT at least 1/2 grade painfree throughout for incr mm support to Rt knee and lower chain to decr stress on back and improve quality of gait  4. Pt able to lay on Rt side s pain to indicate decr irritability  5. Pt able to transfer sit to stand c little to no pain and transition to ambulation s apprehension or difficulty for PLOF  6. Pt able to squat painfree for incr ease of transfers, stairs  7. Pt able to ascend/descend stairs c good form and control c little to no pain for PLOF  8. Pt tolerates sitting c proper low back support for at least 1 hour s LBP for PLOF  9. Pt able to go grocery shopping s reliance on leaning on cart c little to no pain for PLOF  10. indep HEP to promote cont progress toward functional goals    Frequency / Duration: Patient will be seen for 2 x/week or a total of 8 visits over a 90 day period. All Treatments performed at distinct and separate times during the therapy session.   Treatment Minutes  Units charged   Manual Therapy 8 minutes 1   Therapeutic Activity 0 minutes    Neuromuscular Re-education 0 minutes    Therapeutic Exercise 37 minutes 2   Total Direct Treatment Time 45 minutes

## 2023-04-10 ENCOUNTER — OFFICE VISIT (OUTPATIENT)
Dept: PHYSICAL THERAPY | Age: 68
End: 2023-04-10
Attending: FAMILY MEDICINE
Payer: MEDICARE

## 2023-04-10 DIAGNOSIS — R26.2 DIFFICULTY WALKING: ICD-10-CM

## 2023-04-10 DIAGNOSIS — G89.29 CHRONIC RIGHT-SIDED LOW BACK PAIN, UNSPECIFIED WHETHER SCIATICA PRESENT: Primary | ICD-10-CM

## 2023-04-10 DIAGNOSIS — M25.561 CHRONIC PAIN OF RIGHT KNEE: ICD-10-CM

## 2023-04-10 DIAGNOSIS — M54.50 CHRONIC RIGHT-SIDED LOW BACK PAIN, UNSPECIFIED WHETHER SCIATICA PRESENT: Primary | ICD-10-CM

## 2023-04-10 DIAGNOSIS — G89.29 CHRONIC PAIN OF RIGHT KNEE: ICD-10-CM

## 2023-04-10 PROCEDURE — 97110 THERAPEUTIC EXERCISES: CPT

## 2023-04-10 PROCEDURE — 97140 MANUAL THERAPY 1/> REGIONS: CPT

## 2023-04-17 ENCOUNTER — OFFICE VISIT (OUTPATIENT)
Dept: PHYSICAL THERAPY | Age: 68
End: 2023-04-17
Attending: FAMILY MEDICINE
Payer: MEDICARE

## 2023-04-17 DIAGNOSIS — G89.29 CHRONIC RIGHT-SIDED LOW BACK PAIN, UNSPECIFIED WHETHER SCIATICA PRESENT: Primary | ICD-10-CM

## 2023-04-17 DIAGNOSIS — R26.2 DIFFICULTY WALKING: ICD-10-CM

## 2023-04-17 DIAGNOSIS — M54.50 CHRONIC RIGHT-SIDED LOW BACK PAIN, UNSPECIFIED WHETHER SCIATICA PRESENT: Primary | ICD-10-CM

## 2023-04-17 DIAGNOSIS — G89.29 CHRONIC PAIN OF RIGHT KNEE: ICD-10-CM

## 2023-04-17 DIAGNOSIS — M25.561 CHRONIC PAIN OF RIGHT KNEE: ICD-10-CM

## 2023-04-17 PROCEDURE — 97110 THERAPEUTIC EXERCISES: CPT

## 2023-04-17 PROCEDURE — 97140 MANUAL THERAPY 1/> REGIONS: CPT

## 2023-05-10 ENCOUNTER — HOSPITAL ENCOUNTER (OUTPATIENT)
Dept: MAMMOGRAPHY | Age: 68
Discharge: HOME | End: 2023-05-10
Attending: FAMILY MEDICINE
Payer: MEDICARE

## 2023-05-10 DIAGNOSIS — Z12.31 SCREENING MAMMOGRAM FOR BREAST CANCER: ICD-10-CM

## 2023-05-10 PROCEDURE — 77063 BREAST TOMOSYNTHESIS BI: CPT | Performed by: FAMILY MEDICINE

## 2023-05-10 PROCEDURE — 77067 SCR MAMMO BI INCL CAD: CPT | Performed by: FAMILY MEDICINE

## 2023-06-03 ENCOUNTER — PATIENT MESSAGE (OUTPATIENT)
Dept: FAMILY MEDICINE CLINIC | Facility: CLINIC | Age: 68
End: 2023-06-03

## 2023-06-03 DIAGNOSIS — E78.5 HYPERLIPIDEMIA, UNSPECIFIED HYPERLIPIDEMIA TYPE: ICD-10-CM

## 2023-06-03 DIAGNOSIS — I10 ESSENTIAL HYPERTENSION: ICD-10-CM

## 2023-06-05 RX ORDER — LISINOPRIL 10 MG/1
10 TABLET ORAL DAILY
Qty: 90 TABLET | Refills: 3 | Status: SHIPPED | OUTPATIENT
Start: 2023-06-05

## 2023-06-05 RX ORDER — SIMVASTATIN 20 MG
20 TABLET ORAL DAILY
Qty: 90 TABLET | Refills: 3 | Status: SHIPPED | OUTPATIENT
Start: 2023-06-05

## 2023-06-05 RX ORDER — HYDROCHLOROTHIAZIDE 12.5 MG/1
12.5 TABLET ORAL DAILY
Qty: 90 TABLET | Refills: 3 | Status: SHIPPED | OUTPATIENT
Start: 2023-06-05

## 2023-08-30 ENCOUNTER — PATIENT MESSAGE (OUTPATIENT)
Dept: FAMILY MEDICINE CLINIC | Facility: CLINIC | Age: 68
End: 2023-08-30

## 2023-08-30 DIAGNOSIS — E03.9 HYPOTHYROIDISM, UNSPECIFIED TYPE: ICD-10-CM

## 2023-08-30 DIAGNOSIS — R73.03 PREDIABETES: ICD-10-CM

## 2023-08-30 DIAGNOSIS — Z00.00 ENCOUNTER FOR ANNUAL HEALTH EXAMINATION: Primary | ICD-10-CM

## 2023-09-15 ENCOUNTER — LAB ENCOUNTER (OUTPATIENT)
Dept: LAB | Age: 68
End: 2023-09-15
Attending: FAMILY MEDICINE
Payer: MEDICARE

## 2023-09-15 DIAGNOSIS — E03.9 HYPOTHYROIDISM, UNSPECIFIED TYPE: ICD-10-CM

## 2023-09-15 DIAGNOSIS — R73.03 PREDIABETES: ICD-10-CM

## 2023-09-15 DIAGNOSIS — Z00.00 ENCOUNTER FOR ANNUAL HEALTH EXAMINATION: ICD-10-CM

## 2023-09-15 LAB
ALBUMIN SERPL-MCNC: 3.2 G/DL (ref 3.4–5)
ALBUMIN/GLOB SERPL: 0.8 {RATIO} (ref 1–2)
ALP LIVER SERPL-CCNC: 63 U/L
ALT SERPL-CCNC: 26 U/L
ANION GAP SERPL CALC-SCNC: 7 MMOL/L (ref 0–18)
AST SERPL-CCNC: 20 U/L (ref 15–37)
BASOPHILS # BLD AUTO: 0.04 X10(3) UL (ref 0–0.2)
BASOPHILS NFR BLD AUTO: 0.8 %
BILIRUB SERPL-MCNC: 0.4 MG/DL (ref 0.1–2)
BUN BLD-MCNC: 17 MG/DL (ref 7–18)
CALCIUM BLD-MCNC: 9.4 MG/DL (ref 8.5–10.1)
CHLORIDE SERPL-SCNC: 109 MMOL/L (ref 98–112)
CHOLEST SERPL-MCNC: 138 MG/DL (ref ?–200)
CO2 SERPL-SCNC: 26 MMOL/L (ref 21–32)
CREAT BLD-MCNC: 0.83 MG/DL
CREAT UR-SCNC: 203 MG/DL
EGFRCR SERPLBLD CKD-EPI 2021: 76 ML/MIN/1.73M2 (ref 60–?)
EOSINOPHIL # BLD AUTO: 0.23 X10(3) UL (ref 0–0.7)
EOSINOPHIL NFR BLD AUTO: 4.8 %
ERYTHROCYTE [DISTWIDTH] IN BLOOD BY AUTOMATED COUNT: 14.7 %
EST. AVERAGE GLUCOSE BLD GHB EST-MCNC: 120 MG/DL (ref 68–126)
FASTING PATIENT LIPID ANSWER: YES
FASTING STATUS PATIENT QL REPORTED: YES
GLOBULIN PLAS-MCNC: 4.2 G/DL (ref 2.8–4.4)
GLUCOSE BLD-MCNC: 104 MG/DL (ref 70–99)
HBA1C MFR BLD: 5.8 % (ref ?–5.7)
HCT VFR BLD AUTO: 42.9 %
HDLC SERPL-MCNC: 46 MG/DL (ref 40–59)
HGB BLD-MCNC: 13.3 G/DL
IMM GRANULOCYTES # BLD AUTO: 0.01 X10(3) UL (ref 0–1)
IMM GRANULOCYTES NFR BLD: 0.2 %
LDLC SERPL CALC-MCNC: 61 MG/DL (ref ?–100)
LYMPHOCYTES # BLD AUTO: 1.01 X10(3) UL (ref 1–4)
LYMPHOCYTES NFR BLD AUTO: 21 %
MCH RBC QN AUTO: 28.4 PG (ref 26–34)
MCHC RBC AUTO-ENTMCNC: 31 G/DL (ref 31–37)
MCV RBC AUTO: 91.5 FL
MICROALBUMIN UR-MCNC: 10.3 MG/DL
MICROALBUMIN/CREAT 24H UR-RTO: 50.7 UG/MG (ref ?–30)
MONOCYTES # BLD AUTO: 0.43 X10(3) UL (ref 0.1–1)
MONOCYTES NFR BLD AUTO: 9 %
NEUTROPHILS # BLD AUTO: 3.08 X10 (3) UL (ref 1.5–7.7)
NEUTROPHILS # BLD AUTO: 3.08 X10(3) UL (ref 1.5–7.7)
NEUTROPHILS NFR BLD AUTO: 64.2 %
NONHDLC SERPL-MCNC: 92 MG/DL (ref ?–130)
OSMOLALITY SERPL CALC.SUM OF ELEC: 296 MOSM/KG (ref 275–295)
POTASSIUM SERPL-SCNC: 3.9 MMOL/L (ref 3.5–5.1)
PROT SERPL-MCNC: 7.4 G/DL (ref 6.4–8.2)
RBC # BLD AUTO: 4.69 X10(6)UL
SODIUM SERPL-SCNC: 142 MMOL/L (ref 136–145)
TRIGL SERPL-MCNC: 186 MG/DL (ref 30–149)
TSI SER-ACNC: 0.56 MIU/ML (ref 0.36–3.74)
VLDLC SERPL CALC-MCNC: 28 MG/DL (ref 0–30)
WBC # BLD AUTO: 4.8 X10(3) UL (ref 4–11)

## 2023-09-15 PROCEDURE — 85025 COMPLETE CBC W/AUTO DIFF WBC: CPT

## 2023-09-15 PROCEDURE — 82043 UR ALBUMIN QUANTITATIVE: CPT

## 2023-09-15 PROCEDURE — 80053 COMPREHEN METABOLIC PANEL: CPT

## 2023-09-15 PROCEDURE — 82570 ASSAY OF URINE CREATININE: CPT

## 2023-09-15 PROCEDURE — 84443 ASSAY THYROID STIM HORMONE: CPT

## 2023-09-15 PROCEDURE — 83036 HEMOGLOBIN GLYCOSYLATED A1C: CPT

## 2023-09-15 PROCEDURE — 80061 LIPID PANEL: CPT

## 2023-09-15 PROCEDURE — 36415 COLL VENOUS BLD VENIPUNCTURE: CPT

## 2023-09-18 ENCOUNTER — OFFICE VISIT (OUTPATIENT)
Dept: FAMILY MEDICINE CLINIC | Facility: CLINIC | Age: 68
End: 2023-09-18
Payer: MEDICARE

## 2023-09-18 VITALS
BODY MASS INDEX: 45.67 KG/M2 | SYSTOLIC BLOOD PRESSURE: 116 MMHG | OXYGEN SATURATION: 95 % | RESPIRATION RATE: 16 BRPM | WEIGHT: 261 LBS | HEART RATE: 75 BPM | DIASTOLIC BLOOD PRESSURE: 72 MMHG | HEIGHT: 63.5 IN | TEMPERATURE: 98 F

## 2023-09-18 DIAGNOSIS — M19.90 ARTHRITIS: ICD-10-CM

## 2023-09-18 DIAGNOSIS — J30.2 SEASONAL ALLERGIES: ICD-10-CM

## 2023-09-18 DIAGNOSIS — H25.9 SENILE CATARACT, UNSPECIFIED AGE-RELATED CATARACT TYPE, UNSPECIFIED LATERALITY: ICD-10-CM

## 2023-09-18 DIAGNOSIS — E03.9 ACQUIRED HYPOTHYROIDISM: ICD-10-CM

## 2023-09-18 DIAGNOSIS — E66.01 MORBID OBESITY WITH BMI OF 45.0-49.9, ADULT (HCC): ICD-10-CM

## 2023-09-18 DIAGNOSIS — Z00.00 ENCOUNTER FOR ANNUAL HEALTH EXAMINATION: Primary | ICD-10-CM

## 2023-09-18 DIAGNOSIS — Z23 FLU VACCINE NEED: ICD-10-CM

## 2023-09-18 DIAGNOSIS — E78.5 HYPERLIPIDEMIA, UNSPECIFIED HYPERLIPIDEMIA TYPE: ICD-10-CM

## 2023-09-18 DIAGNOSIS — R73.03 PREDIABETES: ICD-10-CM

## 2023-09-18 DIAGNOSIS — K43.9 HERNIA OF ANTERIOR ABDOMINAL WALL: ICD-10-CM

## 2023-09-18 DIAGNOSIS — E03.9 HYPOTHYROIDISM, UNSPECIFIED TYPE: ICD-10-CM

## 2023-09-18 DIAGNOSIS — I10 ESSENTIAL HYPERTENSION: ICD-10-CM

## 2023-09-18 DIAGNOSIS — F33.41 RECURRENT MAJOR DEPRESSIVE DISORDER, IN PARTIAL REMISSION (HCC): ICD-10-CM

## 2023-09-18 DIAGNOSIS — Z85.42 HISTORY OF ENDOMETRIAL CANCER: ICD-10-CM

## 2023-09-18 DIAGNOSIS — L80 VITILIGO: ICD-10-CM

## 2023-09-18 DIAGNOSIS — M54.12 CERVICAL RADICULOPATHY: ICD-10-CM

## 2023-09-18 DIAGNOSIS — M85.852 OSTEOPENIA OF NECK OF LEFT FEMUR: ICD-10-CM

## 2023-09-18 PROBLEM — R89.8 PSEUDOTHROMBOCYTOPENIA: Status: RESOLVED | Noted: 2020-09-27 | Resolved: 2023-09-18

## 2023-09-18 PROBLEM — D69.6 THROMBOCYTOPENIA (HCC): Status: RESOLVED | Noted: 2018-02-27 | Resolved: 2023-09-18

## 2023-09-18 PROBLEM — M65.9 FLEXOR TENOSYNOVITIS OF FINGER: Status: RESOLVED | Noted: 2022-03-14 | Resolved: 2023-09-18

## 2023-09-18 PROBLEM — L60.9 NAIL ABNORMALITY: Status: RESOLVED | Noted: 2018-08-27 | Resolved: 2023-09-18

## 2023-09-18 PROBLEM — D69.6 THROMBOCYTOPENIA: Status: RESOLVED | Noted: 2018-02-27 | Resolved: 2023-09-18

## 2023-09-18 PROBLEM — M65.949 FLEXOR TENOSYNOVITIS OF FINGER: Status: RESOLVED | Noted: 2022-03-14 | Resolved: 2023-09-18

## 2023-09-18 PROCEDURE — 1125F AMNT PAIN NOTED PAIN PRSNT: CPT | Performed by: FAMILY MEDICINE

## 2023-09-18 PROCEDURE — G0447 BEHAVIOR COUNSEL OBESITY 15M: HCPCS | Performed by: FAMILY MEDICINE

## 2023-09-18 PROCEDURE — G0439 PPPS, SUBSEQ VISIT: HCPCS | Performed by: FAMILY MEDICINE

## 2023-09-18 PROCEDURE — G0008 ADMIN INFLUENZA VIRUS VAC: HCPCS | Performed by: FAMILY MEDICINE

## 2023-09-18 PROCEDURE — 90662 IIV NO PRSV INCREASED AG IM: CPT | Performed by: FAMILY MEDICINE

## 2023-09-18 RX ORDER — VENLAFAXINE HYDROCHLORIDE 37.5 MG/1
37.5 CAPSULE, EXTENDED RELEASE ORAL DAILY
Qty: 90 CAPSULE | Refills: 0 | Status: SHIPPED | OUTPATIENT
Start: 2023-09-18 | End: 2023-12-17

## 2023-11-02 ENCOUNTER — PATIENT MESSAGE (OUTPATIENT)
Dept: FAMILY MEDICINE CLINIC | Facility: CLINIC | Age: 68
End: 2023-11-02

## 2023-11-02 DIAGNOSIS — F33.41 RECURRENT MAJOR DEPRESSIVE DISORDER, IN PARTIAL REMISSION (HCC): ICD-10-CM

## 2023-11-02 DIAGNOSIS — E03.9 ACQUIRED HYPOTHYROIDISM: ICD-10-CM

## 2023-11-03 RX ORDER — VENLAFAXINE HYDROCHLORIDE 75 MG/1
75 CAPSULE, EXTENDED RELEASE ORAL DAILY
Qty: 90 CAPSULE | Refills: 0 | Status: SHIPPED | OUTPATIENT
Start: 2023-11-03

## 2023-11-03 RX ORDER — LEVOTHYROXINE SODIUM 13 UG/1
1 CAPSULE ORAL EVERY MORNING
Qty: 90 CAPSULE | Refills: 0 | Status: SHIPPED | OUTPATIENT
Start: 2023-11-03

## 2023-11-03 NOTE — TELEPHONE ENCOUNTER
Medication Quantity Refills Start End   levothyroxine 150 MCG Oral Tab   10/9/2022    Route:   (none)     Class:   Historical     Order #:   [id number]         Medication Quantity Refills Start End   venlafaxine ER 75 MG Oral Capsule SR 24 Hr 90 capsule 3 9/15/2022    Sig:   Take 1 capsule (75 mg total) by mouth daily.      Route:   Oral     Order #:   [id number]       Last OV 9/18/23  Future Appointments   Date Time Provider \[facility]\""   3/18/2024  9:00 AM Mello Gant, DO [facility] [address] [facility] [address]   9/16/2024  9:30 AM Radha Horan, DO [facility] [address] [facility] [address]         Component  Ref Range & Units 9/15/23  7:19 AM   TSH  0.358 - 3.740 mIU/mL 0.564        RX sent

## 2023-12-04 ENCOUNTER — PATIENT MESSAGE (OUTPATIENT)
Dept: FAMILY MEDICINE CLINIC | Facility: CLINIC | Age: 68
End: 2023-12-04

## 2023-12-04 RX ORDER — VENLAFAXINE HYDROCHLORIDE 37.5 MG/1
37.5 CAPSULE, EXTENDED RELEASE ORAL DAILY
Qty: 90 CAPSULE | Refills: 3 | Status: SHIPPED | OUTPATIENT
Start: 2023-12-04

## 2023-12-04 NOTE — TELEPHONE ENCOUNTER
From: Shaquille Herman  To: Bebeto Glover  Sent: 12/4/2023 7:39 AM CST  Subject: Covid vaccination     I received a Covid vaccination on 9/21/23 at [place] along with the RSV vaccination at the same time.

## 2023-12-04 NOTE — TELEPHONE ENCOUNTER
Requesting Venlafaxine 37.5mg  LOV: 9/18/23 Physical  RTC: 1 year  Last Relevant Labs: 9/15/23  Filled: 9/18/23 #90 with 0 refills    Future Appointments   Date Time Provider Ethan Slaughter   3/18/2024  9:00 AM Beny Gant, DO [facility] [address] [facility] [address]   9/16/2024  9:30 AM Royer Gant, DO [facility] [address] [facility] [address]     Non-protocol med:  Rx pended and routed for approval/denial

## 2023-12-05 RX ORDER — VENLAFAXINE HYDROCHLORIDE 37.5 MG/1
37.5 CAPSULE, EXTENDED RELEASE ORAL DAILY
Qty: 90 CAPSULE | Refills: 0 | OUTPATIENT
Start: 2023-12-05 | End: 2024-03-04

## 2023-12-05 NOTE — TELEPHONE ENCOUNTER
Duplicate request - Rx sent 12/4/23    venlafaxine ER 37.5 MG Oral Capsule SR 24 Hr 90 capsule 3 12/4/2023     Sig - Route:  Take 1 capsule (37.5 mg total) by mouth daily. - Oral    Sent to pharmacy as: Venlafaxine HCl ER 37.5 MG Oral Capsule Extended Release 24 Hour (Effexor-XR)    E-Prescribing Status: Receipt confirmed by pharmacy (12/4/2023  3:55 PM CST)      Pharmacy    [address], [facility] [phone], [phone]

## 2024-02-05 DIAGNOSIS — F33.41 RECURRENT MAJOR DEPRESSIVE DISORDER, IN PARTIAL REMISSION (HCC): ICD-10-CM

## 2024-02-05 DIAGNOSIS — E03.9 ACQUIRED HYPOTHYROIDISM: ICD-10-CM

## 2024-02-08 ENCOUNTER — PATIENT MESSAGE (OUTPATIENT)
Dept: FAMILY MEDICINE CLINIC | Facility: CLINIC | Age: 69
End: 2024-02-08

## 2024-02-08 DIAGNOSIS — F33.41 RECURRENT MAJOR DEPRESSIVE DISORDER, IN PARTIAL REMISSION (HCC): ICD-10-CM

## 2024-02-08 DIAGNOSIS — E03.9 ACQUIRED HYPOTHYROIDISM: ICD-10-CM

## 2024-02-08 RX ORDER — LEVOTHYROXINE SODIUM 13 UG/1
1 CAPSULE ORAL EVERY MORNING
Qty: 90 CAPSULE | Refills: 0 | OUTPATIENT
Start: 2024-02-08

## 2024-02-08 RX ORDER — VENLAFAXINE HYDROCHLORIDE 75 MG/1
75 CAPSULE, EXTENDED RELEASE ORAL DAILY
Qty: 90 CAPSULE | Refills: 0 | OUTPATIENT
Start: 2024-02-08

## 2024-02-08 NOTE — TELEPHONE ENCOUNTER
Requested Prescriptions     Pending Prescriptions Disp Refills    Levothyroxine Sodium 150 MCG Oral Cap 90 capsule 0     Sig: Take 1 tablet by mouth every morning.    venlafaxine ER 75 MG Oral Capsule SR 24 Hr 90 capsule 0     Sig: Take 1 capsule (75 mg total) by mouth daily. Take daily with 37.5 mg to equal 112.5 mg daily       LOV: 9/18/23  RTC:   Last Relevant Labs: 9/15/23  Filled: 12/4/23 #90 with  3refills  Venlafaxine 11/3/23  #90 3 refill    Future Appointments   Date Time Provider Department Center   3/18/2024  9:00 AM Royer Gant DO [facility] [address]   9/16/2024  9:30 AM Royer Gant DO [facility] [address]

## 2024-02-09 RX ORDER — VENLAFAXINE HYDROCHLORIDE 75 MG/1
75 CAPSULE, EXTENDED RELEASE ORAL DAILY
Qty: 90 CAPSULE | Refills: 3 | Status: SHIPPED | OUTPATIENT
Start: 2024-02-09

## 2024-02-09 RX ORDER — LEVOTHYROXINE SODIUM 0.15 MG/1
150 TABLET ORAL EVERY MORNING
Qty: 90 TABLET | Refills: 3 | Status: SHIPPED | OUTPATIENT
Start: 2024-02-09

## 2024-02-09 NOTE — TELEPHONE ENCOUNTER
From: Susan Castle  To: Royer Gant  Sent: 2/8/2024 4:42 PM CST  Subject: Refill Rxs    I requested refills on Rxs and received a message that they were denied. Why??? The Rxs are for venlafaxine ER 75 MG and Levothyroxine Sodium 150 mcg. Is there a problem with me receiving these meds?

## 2024-02-09 NOTE — TELEPHONE ENCOUNTER
Requesting Venlafaxine 75mg  Last Rx'd 11/3/23 #90 with 0 refills    Requesting Levothyroxine 150mg  Last Rx'd 11/3/23 #90 with 0 refills    Last OV: 9/18/23 Physical  RTC: 1 year    Future Appointments   Date Time Provider Department Center   3/18/2024  9:00 AM Royer Gant DO [facility] [address]   9/16/2024  9:30 AM Royer Gant DO [facility] [address]       Non-protocol med:  Rx pended and routed for approval/denial

## 2024-03-18 ENCOUNTER — OFFICE VISIT (OUTPATIENT)
Dept: FAMILY MEDICINE CLINIC | Facility: CLINIC | Age: 69
End: 2024-03-18
Payer: MEDICARE

## 2024-03-18 VITALS — WEIGHT: 245 LBS | HEIGHT: 63.5 IN | BODY MASS INDEX: 42.87 KG/M2

## 2024-03-18 DIAGNOSIS — E78.5 HYPERLIPIDEMIA, UNSPECIFIED HYPERLIPIDEMIA TYPE: ICD-10-CM

## 2024-03-18 DIAGNOSIS — Z12.31 BREAST CANCER SCREENING BY MAMMOGRAM: ICD-10-CM

## 2024-03-18 DIAGNOSIS — E03.9 ACQUIRED HYPOTHYROIDISM: ICD-10-CM

## 2024-03-18 DIAGNOSIS — I10 ESSENTIAL HYPERTENSION: Primary | ICD-10-CM

## 2024-03-18 DIAGNOSIS — R73.03 PREDIABETES: ICD-10-CM

## 2024-03-18 PROCEDURE — 99213 OFFICE O/P EST LOW 20 MIN: CPT | Performed by: FAMILY MEDICINE

## 2024-03-18 NOTE — PROGRESS NOTES
Subjective:   Susan Castle is a 69 year old female who presents for Follow - Up (BP follow up )     She does not normally check her BP at home. She is pretty sure she does have a cuff at home. She is due for lab work to follow up on hyperlipidemia, prediabetes, and hypothyroidism.     Started on effexor at last visit for depression. She is taking effexor 75mg + 37.5mg daily.  She reports it has really helped her. She feels without this medication she would not be in a great place after losing her brother. Denies thoughts of hurting herself or others.     Followed up with derm about vitiligo, she was given a cream. One of the areaa of application is near her eyes and she often reports discomfort and irritation in her eyes with the medication. She has noticed a difference on her arms.     She has completed living will and POA paperwork.     History/Other:    Chief Complaint Reviewed and Verified  Nursing Notes Reviewed and   Verified  Tobacco Reviewed  Allergies Reviewed  Medications Reviewed    Problem List Reviewed  Medical History Reviewed  Surgical History   Reviewed  Family History Reviewed         Tobacco:  She smoked tobacco in the past but quit greater than 12 months ago.  Social History    Tobacco Use      Smoking status: Former        Packs/day: 0.00        Years: 0.00        Additional pack years: 0.00        Total pack years: 0.00        Types: Cigarettes        Quit date: 2000        Years since quittin.2      Smokeless tobacco: Never      Tobacco comments: Smoked very rarely a pack would last more than a month.       Current Outpatient Medications   Medication Sig Dispense Refill    NON FORMULARY GOLO      venlafaxine ER 75 MG Oral Capsule SR 24 Hr Take 1 capsule (75 mg total) by mouth daily. 90 capsule 3    levothyroxine 150 MCG Oral Tab Take 1 tablet (150 mcg total) by mouth every morning. 90 tablet 3    venlafaxine ER 37.5 MG Oral Capsule SR 24 Hr Take 1 capsule (37.5 mg total)  by mouth daily. 90 capsule 3    lisinopril 10 MG Oral Tab Take 1 tablet (10 mg total) by mouth daily. 90 tablet 3    simvastatin 20 MG Oral Tab Take 1 tablet (20 mg total) by mouth daily. 90 tablet 3    hydroCHLOROthiazide 12.5 MG Oral Tab Take 1 tablet (12.5 mg total) by mouth daily. 90 tablet 3    letrozole 2.5 MG Oral Tab       Calcium Carbonate Antacid 1000 MG Oral Chew Tab       Acetaminophen  MG Oral Tab CR Take 2 tablets (1,300 mg total) by mouth every other day.      Vitamin B-12 1000 MCG Oral Tab Take 1 tablet (1,000 mcg total) by mouth daily.      Fluticasone Propionate 50 MCG/ACT Nasal Suspension 2 sprays by Nasal route as needed.  2    Cholecalciferol (VITAMIN D3) 77724 units Oral Cap Take 5,000 Units by mouth daily.        aspirin 81 MG Oral Tab Take 1 tablet (81 mg total) by mouth daily.      Desloratadine (CLARINEX OR) Take by mouth.           Review of Systems:  Pertinent items are noted in HPI.  A comprehensive review of systems was negative.  Respiratory: negative except for cough    Wt Readings from Last 6 Encounters:   03/18/24 245 lb (111.1 kg)   09/18/23 261 lb (118.4 kg)   03/13/23 269 lb (122 kg)   10/17/22 263 lb (119.3 kg)   10/11/22 260 lb (117.9 kg)   10/06/22 264 lb (119.7 kg)         Objective:   BP (P) 112/78   Pulse (P) 72   Temp (P) 97.4 °F (36.3 °C) (Temporal)   Resp (P) 16   Ht 5' 3.5\" (1.613 m)   Wt 245 lb (111.1 kg)   LMP  (LMP Unknown)   SpO2 (P) 98%   BMI 42.72 kg/m²  Estimated body mass index is 42.72 kg/m² as calculated from the following:    Height as of this encounter: 5' 3.5\" (1.613 m).    Weight as of this encounter: 245 lb (111.1 kg).  General appearance: alert, cooperative, and no distress  Head: Normocephalic, without obvious abnormality, atraumatic  Throat: lips, mucosa, and tongue normal; teeth and gums normal  Neck: supple, symmetrical, trachea midline and thyroid not enlarged, symmetric, no tenderness/mass/nodules  Lungs: clear to auscultation  bilaterally  Heart: S1, S2 normal, no murmur, click, rub or gallop, regular rate and rhythm  Abdomen: soft, non-tender; bowel sounds normal; no masses,  no organomegaly  Extremities: extremities normal, atraumatic, no cyanosis or edema  Skin:  Skin texture and turgor normal. Small areas of vitiligo present.      Assessment & Plan:   1. Essential hypertension (Primary)  2. Breast cancer screening by mammogram  -     [facility] YADI 2D+3D SCREENING BILAT (CPT=77067/03832); Future; Expected date: 05/19/2024  3. Hyperlipidemia, unspecified hyperlipidemia type  4. Prediabetes  5. Acquired hypothyroidism    Lab work has been ordered, discussed holding off on Hgb A1c, microalbumin, lipid panel, and TSH. She is going to complete the CMP now. . Mammogram order placed - due in May 2024.     Patient blood pressure stable - will continue lisinopril and hydrochlorothiazide. Continue statin for hyperlipidemia. Patient has lost around 20 lbs since last visit after starting the GOLO diet. Encouraged continued weight loss at around 1-2 lbs per week.     Discussed having patient bring in living will and POA paperwork at her next visit. All questions answered during this visit. Will see in September for Medicare Annual visit.         No follow-ups on file.    Jorden WELLS, 3/18/2024, 9:06 AM      I have personally seen and examined the patient, I have reviewed the PA student's examination, notes, and agree with the assessment and plan.        Royer Gant,         This note was prepared using Dragon Medical voice recognition dictation software. As a result errors may occur. When identified these errors have been corrected. While every attempt is made to correct errors during dictation discrepancies may still exist.      Note to patient: The 21st Century Cures Act makes medical notes like these available to patients in the interest of transparency. However, be advised this is a medical document. It is intended as peer to peer  communication. It is written in medical language and may contain abbreviations or verbiage that are unfamiliar. It may appear blunt or direct. Medical documents are intended to carry relevant information, facts as evident, and the clinical opinion of the practitioner.

## 2024-04-18 DIAGNOSIS — I10 ESSENTIAL HYPERTENSION: ICD-10-CM

## 2024-04-18 RX ORDER — VENLAFAXINE HYDROCHLORIDE 37.5 MG/1
37.5 CAPSULE, EXTENDED RELEASE ORAL DAILY
Qty: 90 CAPSULE | Refills: 3 | Status: SHIPPED | OUTPATIENT
Start: 2024-04-18

## 2024-04-18 RX ORDER — LISINOPRIL 10 MG/1
10 TABLET ORAL DAILY
Qty: 90 TABLET | Refills: 3 | Status: SHIPPED | OUTPATIENT
Start: 2024-04-18

## 2024-04-18 RX ORDER — HYDROCHLOROTHIAZIDE 12.5 MG/1
12.5 TABLET ORAL DAILY
Qty: 90 TABLET | Refills: 3 | Status: SHIPPED | OUTPATIENT
Start: 2024-04-18

## 2024-04-18 NOTE — TELEPHONE ENCOUNTER
Requested Prescriptions     Pending Prescriptions Disp Refills    lisinopril 10 MG Oral Tab 90 tablet 3     Sig: Take 1 tablet (10 mg total) by mouth daily.    hydroCHLOROthiazide 12.5 MG Oral Tab 90 tablet 3     Sig: Take 1 tablet (12.5 mg total) by mouth daily.    venlafaxine ER 37.5 MG Oral Capsule SR 24 Hr 90 capsule 3     Sig: Take 1 capsule (37.5 mg total) by mouth daily.       LOV: 3/18/214  RTC:   Filled: 6/12/23 #90 with 3 refills  6/12/23 #90 with 3 refills  12/4/23 #90 with 3 refills     Future Appointments   Date Time Provider Department Center   5/21/2024 10:00 AM [facility]   9/16/2024  9:30 AM Royer Gant DO [facility] [address]

## 2024-05-21 ENCOUNTER — HOSPITAL ENCOUNTER (OUTPATIENT)
Dept: MAMMOGRAPHY | Age: 69
Discharge: HOME | End: 2024-05-21
Attending: FAMILY MEDICINE

## 2024-05-21 DIAGNOSIS — Z12.31 BREAST CANCER SCREENING BY MAMMOGRAM: ICD-10-CM

## 2024-05-21 PROCEDURE — 77067 SCR MAMMO BI INCL CAD: CPT | Performed by: FAMILY MEDICINE

## 2024-05-21 PROCEDURE — 77063 BREAST TOMOSYNTHESIS BI: CPT | Performed by: FAMILY MEDICINE

## 2024-06-10 ENCOUNTER — PATIENT MESSAGE (OUTPATIENT)
Dept: FAMILY MEDICINE CLINIC | Facility: CLINIC | Age: 69
End: 2024-06-10

## 2024-06-10 DIAGNOSIS — E78.5 HYPERLIPIDEMIA, UNSPECIFIED HYPERLIPIDEMIA TYPE: ICD-10-CM

## 2024-06-10 RX ORDER — SIMVASTATIN 20 MG
20 TABLET ORAL DAILY
Qty: 90 TABLET | Refills: 0 | Status: SHIPPED | OUTPATIENT
Start: 2024-06-10

## 2024-06-10 RX ORDER — SIMVASTATIN 20 MG
20 TABLET ORAL DAILY
Qty: 90 TABLET | Refills: 3 | OUTPATIENT
Start: 2024-06-10

## 2024-06-10 NOTE — TELEPHONE ENCOUNTER
Requesting Simvastatin 20mg  LOV: 3/18/24  RTC: 6 months  Last Relevant Labs: 9/15/23  Filled: 6/12/23 #90 with 3 refills    Future Appointments   Date Time Provider Department Center   9/16/2024  9:30 AM Royer Gant DO [facility] [address]     Cholesterol Medication Protocol [id number]/10/2024 08:26 AM   Protocol Details ALT < 80    ALT resulted within past year    Lipid panel within past 12 months    In person appointment or virtual visit in the past 12 mos or appointment in next 3 mos     Rx sent per protocol

## 2024-06-10 NOTE — TELEPHONE ENCOUNTER
From: Susan Castle  To: Royer Gant  Sent: 6/10/2024 4:19 PM CDT  Subject: Simvistatin Rx    I requested a new Rx for Simvastatin 10mg and I received a message back that the request was denied. May I ask why?

## 2024-09-18 ENCOUNTER — LAB ENCOUNTER (OUTPATIENT)
Dept: LAB | Age: 69
End: 2024-09-18
Attending: FAMILY MEDICINE
Payer: MEDICARE

## 2024-09-18 DIAGNOSIS — E78.5 HYPERLIPIDEMIA, UNSPECIFIED HYPERLIPIDEMIA TYPE: ICD-10-CM

## 2024-09-18 DIAGNOSIS — R73.03 PREDIABETES: ICD-10-CM

## 2024-09-18 DIAGNOSIS — E03.9 ACQUIRED HYPOTHYROIDISM: ICD-10-CM

## 2024-09-18 LAB
CHOLEST SERPL-MCNC: 127 MG/DL (ref ?–200)
CREAT UR-SCNC: 125.6 MG/DL
EST. AVERAGE GLUCOSE BLD GHB EST-MCNC: 111 MG/DL (ref 68–126)
FASTING PATIENT LIPID ANSWER: YES
HBA1C MFR BLD: 5.5 % (ref ?–5.7)
HDLC SERPL-MCNC: 50 MG/DL (ref 40–59)
LDLC SERPL CALC-MCNC: 59 MG/DL (ref ?–100)
MICROALBUMIN UR-MCNC: 4.6 MG/DL
MICROALBUMIN/CREAT 24H UR-RTO: 36.6 UG/MG (ref ?–30)
NONHDLC SERPL-MCNC: 77 MG/DL (ref ?–130)
T3FREE SERPL-MCNC: 2.41 PG/ML (ref 2.4–4.2)
T4 FREE SERPL-MCNC: 1.5 NG/DL (ref 0.8–1.7)
TRIGL SERPL-MCNC: 98 MG/DL (ref 30–149)
TSI SER-ACNC: 0.36 MIU/ML (ref 0.55–4.78)
VLDLC SERPL CALC-MCNC: 14 MG/DL (ref 0–30)

## 2024-09-18 PROCEDURE — 82570 ASSAY OF URINE CREATININE: CPT

## 2024-09-18 PROCEDURE — 83036 HEMOGLOBIN GLYCOSYLATED A1C: CPT

## 2024-09-18 PROCEDURE — 80061 LIPID PANEL: CPT

## 2024-09-18 PROCEDURE — 84443 ASSAY THYROID STIM HORMONE: CPT

## 2024-09-18 PROCEDURE — 84439 ASSAY OF FREE THYROXINE: CPT

## 2024-09-18 PROCEDURE — 84481 FREE ASSAY (FT-3): CPT

## 2024-09-18 PROCEDURE — 82043 UR ALBUMIN QUANTITATIVE: CPT

## 2024-09-18 PROCEDURE — 36415 COLL VENOUS BLD VENIPUNCTURE: CPT

## 2024-09-19 ENCOUNTER — TELEPHONE (OUTPATIENT)
Dept: FAMILY MEDICINE CLINIC | Facility: CLINIC | Age: 69
End: 2024-09-19

## 2024-09-19 ENCOUNTER — PATIENT MESSAGE (OUTPATIENT)
Dept: FAMILY MEDICINE CLINIC | Facility: CLINIC | Age: 69
End: 2024-09-19

## 2024-09-19 ENCOUNTER — OFFICE VISIT (OUTPATIENT)
Dept: FAMILY MEDICINE CLINIC | Facility: CLINIC | Age: 69
End: 2024-09-19
Payer: MEDICARE

## 2024-09-19 VITALS
DIASTOLIC BLOOD PRESSURE: 72 MMHG | OXYGEN SATURATION: 97 % | HEIGHT: 63.5 IN | HEART RATE: 62 BPM | SYSTOLIC BLOOD PRESSURE: 110 MMHG | RESPIRATION RATE: 16 BRPM | TEMPERATURE: 98 F | BODY MASS INDEX: 39.55 KG/M2 | WEIGHT: 226 LBS

## 2024-09-19 DIAGNOSIS — M54.12 CERVICAL RADICULOPATHY: ICD-10-CM

## 2024-09-19 DIAGNOSIS — E78.2 MIXED HYPERLIPIDEMIA: ICD-10-CM

## 2024-09-19 DIAGNOSIS — R73.03 PREDIABETES: ICD-10-CM

## 2024-09-19 DIAGNOSIS — I10 ESSENTIAL HYPERTENSION: ICD-10-CM

## 2024-09-19 DIAGNOSIS — E78.5 HYPERLIPIDEMIA, UNSPECIFIED HYPERLIPIDEMIA TYPE: ICD-10-CM

## 2024-09-19 DIAGNOSIS — J30.2 SEASONAL ALLERGIES: ICD-10-CM

## 2024-09-19 DIAGNOSIS — E03.8 OTHER SPECIFIED HYPOTHYROIDISM: ICD-10-CM

## 2024-09-19 DIAGNOSIS — M85.852 OSTEOPENIA OF NECK OF LEFT FEMUR: ICD-10-CM

## 2024-09-19 DIAGNOSIS — M19.90 ARTHRITIS: ICD-10-CM

## 2024-09-19 DIAGNOSIS — H25.9 SENILE CATARACT, UNSPECIFIED AGE-RELATED CATARACT TYPE, UNSPECIFIED LATERALITY: ICD-10-CM

## 2024-09-19 DIAGNOSIS — Z85.42 HISTORY OF ENDOMETRIAL CANCER: ICD-10-CM

## 2024-09-19 DIAGNOSIS — F33.42 RECURRENT MAJOR DEPRESSIVE DISORDER, IN FULL REMISSION (HCC): ICD-10-CM

## 2024-09-19 DIAGNOSIS — Z00.00 ENCOUNTER FOR ANNUAL HEALTH EXAMINATION: Primary | ICD-10-CM

## 2024-09-19 PROBLEM — Q01.9 ENCEPHALOCELE (HCC): Status: RESOLVED | Noted: 2020-09-27 | Resolved: 2024-09-19

## 2024-09-19 RX ORDER — LEVOTHYROXINE SODIUM 137 UG/1
137 TABLET ORAL
Qty: 90 TABLET | Refills: 0 | Status: SHIPPED | OUTPATIENT
Start: 2024-09-19

## 2024-09-19 RX ORDER — LISINOPRIL 10 MG/1
10 TABLET ORAL DAILY
Qty: 90 TABLET | Refills: 3 | Status: SHIPPED | OUTPATIENT
Start: 2024-09-19

## 2024-09-19 RX ORDER — HYDROCHLOROTHIAZIDE 12.5 MG/1
12.5 TABLET ORAL DAILY
Qty: 90 TABLET | Refills: 3 | Status: SHIPPED | OUTPATIENT
Start: 2024-09-19

## 2024-09-19 RX ORDER — VENLAFAXINE HYDROCHLORIDE 75 MG/1
75 CAPSULE, EXTENDED RELEASE ORAL DAILY
Qty: 90 CAPSULE | Refills: 3 | Status: SHIPPED | OUTPATIENT
Start: 2024-09-19

## 2024-09-19 RX ORDER — SIMVASTATIN 20 MG
20 TABLET ORAL DAILY
Qty: 90 TABLET | Refills: 2 | Status: SHIPPED | OUTPATIENT
Start: 2024-09-19

## 2024-09-19 RX ORDER — VENLAFAXINE HYDROCHLORIDE 37.5 MG/1
37.5 CAPSULE, EXTENDED RELEASE ORAL DAILY
Qty: 90 CAPSULE | Refills: 3 | Status: SHIPPED | OUTPATIENT
Start: 2024-09-19

## 2024-09-19 NOTE — TELEPHONE ENCOUNTER
From: Susan Castle  To: Royer Gant  Sent: 9/19/2024 10:29 AM CDT  Subject: Regarding medications    I am fine on my meds this time so I do not need them sent in. When I do they should be sent to [facility] not [facility].   The new dosage for levothyroxine only should be sent to [facility] at this time.   Thank you

## 2024-09-19 NOTE — PROGRESS NOTES
Subjective:   Susan Castle is a 69 year old female who presents for a Medicare Subsequent Annual Wellness visit (Pt already had Initial Annual Wellness) and stable chronic illnesses (not addressed in visit).     Patient is here for her Medicare annual wellness visit.  Thyroid levels   Show that her TSH is low at 0.362.  T3 and T4 are normal.  She is currently taking levothyroxine 150 mcg daily.  Will reduce the levothyroxine to 137 mcg daily and recheck her TSH in 6 weeks.    A1c 5.5, microalbuminuria noted mild degree.  Cholesterol labs are normal.  CBC and CMP unremarkable.    Colonoscopy done in 2021- repeat due in 2026.     Patient Active Problem List:     Hyperlipidemia- continue on statin.      Essential hypertension- continue on hydrochlorothiazide and lisinopril      Hypothyroidism-plan as noted above     Seasonal allergies- continue on clarinex and flonase.      History of endometrial cancer- stable on letrozole. Hx of  stage 2 adenocarcinoma. S/p robotic assistal total laparoscopic hysterectomy, BSO, omentectomy in 2017. No metastatic disease. On letrozaole since 2020. She had recurrence of vaginal cuff cancerous cells noted- no mets. On letrozole.     Follows with gyn-onc every 6 months.      Arthritis- tylenol prn      Osteopenia- taking calcium and vit d, last DEXA scan in 2022 was normal.     Cataract- s/p left cataract removal. Right eye stable.      Cervical radiculopathy- taking tylenol prn.         Prediabetes- Last A1c value was 5.5% done 9/18/2024.  MDD- she is currently on effexor 75mg daily and  effexor 37.5mg ER. No SI or HI.     Mammogram utd BI-RADS 1, completed in May 2024    She has done well with weight loss as noted below    Wt Readings from Last 6 Encounters:   09/19/24 226 lb (102.5 kg)   03/18/24 245 lb (111.1 kg)   09/18/23 261 lb (118.4 kg)   03/13/23 269 lb (122 kg)   10/17/22 263 lb (119.3 kg)   10/11/22 260 lb (117.9 kg)         History/Other:   Fall Risk Assessment:   She  has been screened for Falls and is low risk.      Cognitive Assessment:   She had a completely normal cognitive assessment - see flowsheet entries       Functional Ability/Status:   Susan Castle has some abnormal functions as listed below:  She has Hearing problems based on screening of functional status.She has Vision problems based on screening of functional status.       Depression Screening (PHQ-2/PHQ-9): PHQ-2 SCORE: 1  , done 9/12/2024   Feeling down, depressed, or hopeless: 1    Last [facility] Suicide Screening on 9/19/2024 was No Risk.       Advanced Directives:   She does have a Living Will but we do NOT have it on file in Epic.    She does have a POA but we do NOT have it on file in Epic.    Discussed Advance Care Planning with patient (and family/surrogate if present). Standard forms made available to patient in After Visit Summary.      Patient Active Problem List   Diagnosis    Depression    Hyperlipidemia    Essential hypertension    Hypothyroidism    Seasonal allergies    Arthritis    Osteopenia    Cataract    Cervical radiculopathy    Prediabetes    Hernia of anterior abdominal wall    History of endometrial cancer     Allergies:  She is allergic to seasonal.    Current Medications:  Outpatient Medications Marked as Taking for the 9/19/24 encounter (Office Visit) with Royer Gant DO   Medication Sig    simvastatin 20 MG Oral Tab TAKE 1 TABLET DAILY    lisinopril 10 MG Oral Tab Take 1 tablet (10 mg total) by mouth daily.    hydroCHLOROthiazide 12.5 MG Oral Tab Take 1 tablet (12.5 mg total) by mouth daily.    venlafaxine ER 37.5 MG Oral Capsule SR 24 Hr Take 1 capsule (37.5 mg total) by mouth daily.    NON FORMULARY GOLO    venlafaxine ER 75 MG Oral Capsule SR 24 Hr Take 1 capsule (75 mg total) by mouth daily.    levothyroxine 150 MCG Oral Tab Take 1 tablet (150 mcg total) by mouth every morning.    letrozole 2.5 MG Oral Tab     Calcium Carbonate Antacid 1000 MG Oral Chew Tab      Acetaminophen  MG Oral Tab CR Take 2 tablets (1,300 mg total) by mouth every other day.    Vitamin B-12 1000 MCG Oral Tab Take 1 tablet (1,000 mcg total) by mouth daily.    Fluticasone Propionate 50 MCG/ACT Nasal Suspension 2 sprays by Nasal route as needed.    Cholecalciferol (VITAMIN D3) 77584 units Oral Cap Take 5,000 Units by mouth daily.      aspirin 81 MG Oral Tab Take 1 tablet (81 mg total) by mouth daily.    Desloratadine (CLARINEX OR) Take by mouth.       Medical History:  She  has a past medical history of Allergic rhinitis, Arthritis, Bloating (2002), Blood disorder, Body piercing (), Cancer (), Depression (2018), Essential hypertension (prior to ), Exposure to medical diagnostic radiation, Flatulence/gas pain/belching (2020), High cholesterol, Hyperlipidemia (prior to ), Hypertension, Hypothyroid, Hypothyroidism, Iritis, Leaking of urine (), Nausea (), Obesity, Pain in joints, PONV (postoperative nausea and vomiting), Seasonal allergies (2018), Thrombocytopenia (HCC) (2018), Uterine cancer (), Vomiting (), and Wears glasses.  Surgical History:  She  has a past surgical history that includes tonsillectomy (); hernia surgery (); Abdominoplasty (); gastric bypass,obese<100cm kenneth-en-y (); hysterectomy (2017); bowel resection (); adenoidectomy; other surgical history (2020); cholecystectomy (); colonoscopy (); gastric bypass,obesity,sb reconstruc (2002); total abdom hysterectomy (2017);  (); create eardrum opening,gen anesth; colonoscopy (N/A, 2021); oophorectomy (2017); cataract ();  (, , ); and tubal ligation ().   Family History:  Her family history includes Cancer in her brother; Diabetes in her mother; Heart Disease in her father and mother; Other in her mother and sister; Stroke in her brother; brain tumor in her brother; ms in her brother.  Social  History:  She  reports that she has never smoked. She has been exposed to tobacco smoke. She has never used smokeless tobacco. She reports current alcohol use. She reports that she does not use drugs.    Tobacco:  She has never smoked tobacco.      CAGE Alcohol Screen:   CAGE screening score of 0 on 9/12/2024, showing low risk of alcohol abuse.      Patient Care Team:  Royer Gant DO as PCP - General (Family Medicine)  Sue Barroso, RN as Registered Nurse (Registered Nurse)  Ada Stevenson MD (Radiation Oncology)  Sonia Garrison, RERE as Registered Nurse  Bartolome Reyes, RN as Registered Nurse (Registered Nurse)  Reg Gordon MD (SURGERY, ORTHOPEDIC)  Quita Avila, SAMY as Physical Therapist    Review of Systems  GENERAL: feels well otherwise  EYES: denies blurred vision or double vision  HEENT: denies nasal congestion, sinus pain or ST  LUNGS: denies shortness of breath with exertion  CARDIOVASCULAR: denies chest pain on exertion  GI: denies abdominal pain, denies heartburn  : denies dysuria, vaginal discharge or itching, no complaint of urinary incontinence   MUSCULOSKELETAL: denies back pain    Objective:   Physical Exam  General Appearance:  Alert, cooperative, no distress, appears stated age   Head:  Normocephalic, without obvious abnormality, atraumatic   Eyes:  PERRL   Ears:  Normal TM's and external ear canals, both ears   Nose: Nares normal   Throat: Lips, mucosa, and tongue normal   Neck: Supple       Lungs:   Clear to auscultation bilaterally, respirations unlabored   Heart:  Regular rate and rhythm, S1 and S2 normal, no murmur, rub, or gallop   Abdomen:   Soft, non-tender, bowel sounds active all four quadrants,  no masses, no organomegaly   Skin: Hypopigmented areas on eyelids, corners of month, wrists ventral aspect, abdomen.    Extremities: Extremities normal   Neurologic: Normal       /72   Pulse 62   Temp 98 °F (36.7 °C) (Temporal)   Resp 16   Ht 5' 3.5\" (1.613 m)    Wt 226 lb (102.5 kg)   LMP  (LMP Unknown)   SpO2 97%   BMI 39.41 kg/m²  Estimated body mass index is 39.41 kg/m² as calculated from the following:    Height as of this encounter: 5' 3.5\" (1.613 m).    Weight as of this encounter: 226 lb (102.5 kg).    Medicare Hearing Assessment:   Hearing Screening    Time taken: 9/19/2024  9:38 AM  Entry User: Neyda Luu MA  Screening Method: Whisper Test  Whisper Test Result: Pass         Visual Acuity:   Right Eye Visual Acuity: Corrected Right Eye Chart Acuity: 20/20   Left Eye Visual Acuity: Corrected Left Eye Chart Acuity: 20/20   Both Eyes Visual Acuity: Corrected Both Eyes Chart Acuity: 20/20   Able To Tolerate Visual Acuity: Yes      She has cataracts s/p surgery in the left eye    Assessment & Plan:   Susan Castle is a 69 year old female who presents for a Medicare Assessment.     1. Mixed hyperlipidemia  Continue on statin. Repeat cmp in 6 months. Stable on simvastatin 20mg per day.   Lipid panel normal.     2. Essential hypertension  BP shows good control with last BP of 110/72. Continue lifestyle changes, diet, exercise and weight loss.   9/18/2024: Potassium 4.1; Creatinine 0.70; eGFR-Cr 92  BP Meds: hydroCHLOROthiazide Tabs - 12.5 MG; lisinopril Tabs - 10 MG   ACE/ARB Adherence Excellent at 100%     - lisinopril 10 MG Oral Tab; Take 1 tablet (10 mg total) by mouth daily.  Dispense: 90 tablet; Refill: 3  - hydroCHLOROthiazide 12.5 MG Oral Tab; Take 1 tablet (12.5 mg total) by mouth daily.  Dispense: 90 tablet; Refill: 3    3. Prediabetes  Resolved  Last A1c value was 5.5% done 9/18/2024.  Continue with diet and exercise.     4. Cervical radiculopathy  Well controlled.     5. Recurrent major depressive disorder, in full remission (HCC)  Clinical Course: stable   Good control Plan : Instructed patient to contact office or on-call physician promptly should condition worsen or any new symptoms appear and provided on-call telephone numbers. IF THE PATIENT HAS  ANY SUICIDAL OR HOMICIDAL IDEATIONS, CALL THE OFFICE, DISCUSS WITH A SUPPORT MEMBER, OR GO TO THE ER IMMEDIATELY. Patient was agreeable with this plan.   Antidepressant Meds: venlafaxine ER Cp24 - 37.5 MG, 75 MG      6. Other specified hypothyroidism  Reduced thyroid dosing, likely her tsh was high due to her recent weight loss efforts.   Reduced leovthyroxine 150->137.5mcg   Repeat tsh in 6 weeks.   - levothyroxine 137 MCG Oral Tab; Take 137 mcg by mouth before breakfast.  Dispense: 90 tablet; Refill: 0  - TSH W Reflex To Free T4 [E]; Future    7. Osteopenia of neck of left femur  Stable, noted on dexa, last dexa in 2022. Will hold off on repeating since BMD was normal   Cont otc vit d and ca.     8. History of endometrial cancer  Hx of  stage 2 adenocarcinoma. S/p robotic assistal total laparoscopic hysterectomy, BSO, omentectomy in 2017. No metastatic disease. On letrozaole since 2020. She had recurrence of vaginal cuff cancerous cells noted- no mets. On letrozole.    9. Arthritis  In her hands mostly due to crocheting.   Takes tylenol as needed.     10. Encounter for annual health examination    11. Recurrent major depressive disorder, in full remission (HCC)  Continue effexor 75mg + 37.5mg daily.   - venlafaxine ER 75 MG Oral Capsule SR 24 Hr; Take 1 capsule (75 mg total) by mouth daily.  Dispense: 90 capsule; Refill: 3    12. Senile cataract, unspecified age-related cataract type, unspecified laterality  S/p left cataract surgery.     13. Seasonal allergies  Pt on otc claritin and flonase.     14. Hyperlipidemia, unspecified hyperlipidemia type  - simvastatin 20 MG Oral Tab; Take 1 tablet (20 mg total) by mouth daily.  Dispense: 90 tablet; Refill: 2     The patient indicates understanding of these issues and agrees to the plan.  Reinforced healthy diet, lifestyle, and exercise.      Return in 6 months (on 3/19/2025).     Royer Gant DO    Supplementary Documentation:   General Health:  In the past six  months, have you lost more than 10 pounds without trying?: 1 - Yes  Has your appetite been poor?: No  Type of Diet: Low Carb  How does the patient maintain a good energy level?: Other  How would you describe your daily physical activity?: Light  How would you describe your current health state?: Good  How do you maintain positive mental well-being?: Puzzles;Games;Visiting Family  On a scale of 0 to 10, with 0 being no pain and 10 being severe pain, what is your pain level?: 1 - (Mild)  In the past six months, have you experienced urine leakage?: 1-Yes  At any time do you feel concerned for the safety/well-being of yourself and/or your children, in your home or elsewhere?: No  Have you had any immunizations at another office such as Influenza, Hepatitis B, Tetanus, or Pneumococcal?: Yes         Susan Castle's SCREENING SCHEDULE   Tests on this list are recommended by your physician but may not be covered, or covered at this frequency, by your insurer.   Please check with your insurance carrier before scheduling to verify coverage.   PREVENTATIVE SERVICES FREQUENCY &  COVERAGE DETAILS LAST COMPLETION DATE   Diabetes Screening    Fasting Blood Sugar /  Glucose    One screening every 12 months if never tested or if previously tested but not diagnosed with pre-diabetes   One screening every 6 months if diagnosed with pre-diabetes Lab Results   Component Value Date     (H) 09/18/2024        Cardiovascular Disease Screening    Lipid Panel  Cholesterol  Lipoprotein (HDL)  Triglycerides Covered every 5 years for all Medicare beneficiaries without apparent signs or symptoms of cardiovascular disease Lab Results   Component Value Date    CHOLEST 127 09/18/2024    HDL 50 09/18/2024    LDL 59 09/18/2024    TRIG 98 09/18/2024         Electrocardiogram (EKG)   Covered if needed at Welcome to Medicare, and non-screening if indicated for medical reasons 08/26/2020      Ultrasound Screening for Abdominal Aortic Aneurysm  (AAA) Covered once in a lifetime for one of the following risk factors    Men who are 65-75 years old and have ever smoked    Anyone with a family history -     Colorectal Cancer Screening  Covered for ages 50-85; only need ONE of the following:    Colonoscopy   Covered every 10 years    Covered every 2 years if patient is at high risk or previous colonoscopy was abnormal 03/05/2021    Health Maintenance   Topic Date Due    Colorectal Cancer Screening  03/05/2026       Flexible Sigmoidoscopy   Covered every 4 years -    Fecal Occult Blood Test Covered annually -   Bone Density Screening    Bone density screening    Covered every 2 years after age 65 if diagnosed with risk of osteoporosis or estrogen deficiency.    Covered yearly for long-term glucocorticoid medication use (Steroids) Last Dexa Scan:    XR DEXA BONE DENSITOMETRY (CPT=77080) 10/04/2022      No recommendations at this time   Pap and Pelvic    Pap   Covered every 2 years for women at normal risk; Annually if at high risk -  No recommendations at this time    Chlamydia Annually if high risk -  No recommendations at this time   Screening Mammogram    Mammogram     Recommend annually for all female patients aged 40 and older    One baseline mammogram covered for patients aged 35-39 05/21/2024    Health Maintenance   Topic Date Due    Mammogram  05/21/2025       Immunizations    Influenza Covered once per flu season  Please get every year -  No recommendations at this time    Pneumococcal Each vaccine ([id number] & [id number]) covered once after 65 Prevnar 13: 10/03/2018    [id number]: 10/03/2018     No recommendations at this time    Hepatitis B One screening covered for patients with certain risk factors   -  No recommendations at this time    Tetanus Toxoid Not covered by Medicare Part B unless medically necessary (cut with metal); may be covered with your pharmacy prescription benefits -    Tetanus, Diptheria and Pertusis TD and TDaP Not covered by  Medicare Part B -  No recommendations at this time    Zoster Not covered by Medicare Part B; may be covered with your pharmacy  prescription benefits 04/10/2015  No recommendations at this time     Annual Monitoring of Persistent Medications (ACE/ARB, digoxin diuretics, anticonvulsants)    Potassium Annually Lab Results   Component Value Date    K 4.1 09/18/2024         Creatinine   Annually Lab Results   Component Value Date    CREATSERUM 0.70 09/18/2024         BUN Annually Lab Results   Component Value Date    BUN 23 09/18/2024       Drug Serum Conc Annually No results found for: \"DIGOXIN\", \"DIG\", \"VALP\"

## 2024-09-20 ENCOUNTER — PATIENT MESSAGE (OUTPATIENT)
Dept: FAMILY MEDICINE CLINIC | Facility: CLINIC | Age: 69
End: 2024-09-20

## 2024-09-20 NOTE — TELEPHONE ENCOUNTER
From: Susan Castle  To: Royer Gant  Sent: 9/20/2024 10:03 AM CDT  Subject: Updated vaccinations    Just wanted to let you know that I got my flu shot and updated Covid shot today at [facility].

## 2024-10-09 DIAGNOSIS — E78.5 HYPERLIPIDEMIA, UNSPECIFIED HYPERLIPIDEMIA TYPE: ICD-10-CM

## 2024-10-10 RX ORDER — SIMVASTATIN 20 MG
20 TABLET ORAL DAILY
Qty: 90 TABLET | Refills: 3 | OUTPATIENT
Start: 2024-10-10

## 2024-10-10 NOTE — TELEPHONE ENCOUNTER
Requested Prescriptions     Pending Prescriptions Disp Refills    SIMVASTATIN 20 MG Oral Tab [Pharmacy Med Name: SIMVASTATIN TABS 20MG] 90 tablet 3     Sig: TAKE 1 TABLET DAILY       LOV: 9/19/24  RTC: 6 m  Last Relevant Labs: 9/18/24  Filled: 9/19/24    Future Appointments   Date Time Provider Department Center   10/31/2024  9:00 AM REF SO [facility] REF EMG17 Ref [facility] 17

## 2024-10-14 ENCOUNTER — PATIENT MESSAGE (OUTPATIENT)
Dept: FAMILY MEDICINE CLINIC | Facility: CLINIC | Age: 69
End: 2024-10-14

## 2024-10-14 DIAGNOSIS — E78.5 HYPERLIPIDEMIA, UNSPECIFIED HYPERLIPIDEMIA TYPE: ICD-10-CM

## 2024-10-14 RX ORDER — SIMVASTATIN 20 MG
20 TABLET ORAL DAILY
Qty: 90 TABLET | Refills: 2 | OUTPATIENT
Start: 2024-10-14

## 2024-10-14 NOTE — TELEPHONE ENCOUNTER
Requested Prescriptions     Pending Prescriptions Disp Refills    simvastatin 20 MG Oral Tab 90 tablet 2     Sig: Take 1 tablet (20 mg total) by mouth daily.     LOV: 9/14/24  RTC: 6m  Last Relevant Labs: 9/18/24  Filled: 9/19/24 #90 with 2 refills    Future Appointments   Date Time Provider Department Center   10/23/2024  1:40 PM PFS DEXA  1 PFS DEXA [facility]   10/31/2024  9:00 AM REF SO [facility] REF EMG17 Ref [facility] 17

## 2024-10-15 RX ORDER — SIMVASTATIN 20 MG
20 TABLET ORAL DAILY
Qty: 90 TABLET | Refills: 1 | Status: SHIPPED | OUTPATIENT
Start: 2024-10-15

## 2024-10-23 ENCOUNTER — HOSPITAL ENCOUNTER (OUTPATIENT)
Dept: BONE DENSITY | Age: 69
Discharge: HOME | End: 2024-10-23
Attending: OBSTETRICS & GYNECOLOGY
Payer: MEDICARE

## 2024-10-23 DIAGNOSIS — Z78.0 OSTEOPENIA AFTER MENOPAUSE: ICD-10-CM

## 2024-10-23 DIAGNOSIS — M85.80 OSTEOPENIA AFTER MENOPAUSE: ICD-10-CM

## 2024-10-23 DIAGNOSIS — C54.1 ENDOMETRIAL CANCER (HCC): ICD-10-CM

## 2024-10-23 PROCEDURE — 77080 DXA BONE DENSITY AXIAL: CPT | Performed by: OBSTETRICS & GYNECOLOGY

## 2024-10-31 ENCOUNTER — LAB ENCOUNTER (OUTPATIENT)
Dept: LAB | Age: 69
End: 2024-10-31
Attending: FAMILY MEDICINE
Payer: MEDICARE

## 2024-10-31 DIAGNOSIS — E03.8 OTHER SPECIFIED HYPOTHYROIDISM: ICD-10-CM

## 2024-10-31 LAB — TSI SER-ACNC: 2.66 MIU/ML (ref 0.55–4.78)

## 2024-10-31 PROCEDURE — 84443 ASSAY THYROID STIM HORMONE: CPT

## 2024-10-31 PROCEDURE — 36415 COLL VENOUS BLD VENIPUNCTURE: CPT

## 2024-11-05 ENCOUNTER — PATIENT MESSAGE (OUTPATIENT)
Dept: FAMILY MEDICINE CLINIC | Facility: CLINIC | Age: 69
End: 2024-11-05

## 2024-11-05 DIAGNOSIS — E03.8 OTHER SPECIFIED HYPOTHYROIDISM: ICD-10-CM

## 2024-11-05 RX ORDER — LEVOTHYROXINE SODIUM 137 UG/1
137 TABLET ORAL
Qty: 90 TABLET | Refills: 0 | Status: SHIPPED | OUTPATIENT
Start: 2024-11-05 | End: 2024-11-05

## 2024-11-05 RX ORDER — LEVOTHYROXINE SODIUM 137 UG/1
137 TABLET ORAL
Qty: 90 TABLET | Refills: 0 | Status: SHIPPED | OUTPATIENT
Start: 2024-11-05

## 2024-11-05 NOTE — TELEPHONE ENCOUNTER
Disp Refills Start End     levothyroxine 137 MCG Oral Tab 90 tablet 0 11/5/2024 --    Sig - Route: Take 137 mcg by mouth before breakfast. - Oral    Sent to pharmacy as: Levothyroxine Sodium 137 MCG Oral Tablet (Synthroid)    E-Prescribing Status: Receipt confirmed by pharmacy (11/5/2024 11:40 AM CST)      RX resent to mail order

## 2024-12-24 RX ORDER — VENLAFAXINE HYDROCHLORIDE 75 MG/1
75 CAPSULE, EXTENDED RELEASE ORAL DAILY
Qty: 90 CAPSULE | Refills: 3 | OUTPATIENT
Start: 2024-12-24

## 2024-12-24 NOTE — TELEPHONE ENCOUNTER
Requested Prescriptions     Pending Prescriptions Disp Refills    VENLAFAXINE ER 75 MG Oral Capsule SR 24 Hr [Pharmacy Med Name: VENLAFAXINE HCL ER CAPS 75MG] 90 capsule 3     Sig: TAKE 1 CAPSULE DAILY       LOV: 9/19/24  RTC: 6 m  Last Relevant Labs: [id number]  Filled: 9/19/24 #90 with 3 refills    No future appointments.

## 2025-01-14 RX ORDER — VENLAFAXINE HYDROCHLORIDE 75 MG/1
75 CAPSULE, EXTENDED RELEASE ORAL DAILY
Qty: 90 CAPSULE | Refills: 2 | Status: SHIPPED | OUTPATIENT
Start: 2025-01-14

## 2025-02-05 ENCOUNTER — OFFICE VISIT (OUTPATIENT)
Dept: FAMILY MEDICINE CLINIC | Facility: CLINIC | Age: 70
End: 2025-02-05
Payer: MEDICARE

## 2025-02-05 VITALS
DIASTOLIC BLOOD PRESSURE: 70 MMHG | OXYGEN SATURATION: 97 % | HEART RATE: 66 BPM | WEIGHT: 220 LBS | HEIGHT: 63.5 IN | BODY MASS INDEX: 38.5 KG/M2 | RESPIRATION RATE: 16 BRPM | SYSTOLIC BLOOD PRESSURE: 112 MMHG | TEMPERATURE: 98 F

## 2025-02-05 DIAGNOSIS — F45.8 TEETH GRINDING: ICD-10-CM

## 2025-02-05 DIAGNOSIS — M26.609 TMJ DYSFUNCTION: Primary | ICD-10-CM

## 2025-02-05 PROCEDURE — 3074F SYST BP LT 130 MM HG: CPT | Performed by: FAMILY MEDICINE

## 2025-02-05 PROCEDURE — 99213 OFFICE O/P EST LOW 20 MIN: CPT | Performed by: FAMILY MEDICINE

## 2025-02-05 PROCEDURE — 3008F BODY MASS INDEX DOCD: CPT | Performed by: FAMILY MEDICINE

## 2025-02-05 PROCEDURE — 1160F RVW MEDS BY RX/DR IN RCRD: CPT | Performed by: FAMILY MEDICINE

## 2025-02-05 PROCEDURE — 1159F MED LIST DOCD IN RCRD: CPT | Performed by: FAMILY MEDICINE

## 2025-02-05 PROCEDURE — 1125F AMNT PAIN NOTED PAIN PRSNT: CPT | Performed by: FAMILY MEDICINE

## 2025-02-05 PROCEDURE — 3078F DIAST BP <80 MM HG: CPT | Performed by: FAMILY MEDICINE

## 2025-02-05 RX ORDER — MELOXICAM 15 MG/1
15 TABLET ORAL DAILY PRN
Qty: 14 TABLET | Refills: 0 | Status: SHIPPED | OUTPATIENT
Start: 2025-02-05 | End: 2025-02-19

## 2025-02-05 NOTE — PROGRESS NOTES
[facility] Visit Note  2/5/2025      Subjective:      Patient ID: Susan Castle is a 69 year old female.    Chief Complaint:  Chief Complaint   Patient presents with    Ear Pain     States she's having left ear and jaw pain x 4 days.       HPI:  Susan Castle is a 69 year old female who is being seen today for the above.      L ear and jaw pain-  x 4 days. 2wks ago had a head cold with cough, runny nose and seems to be better. Now the jaw hurts to move and the ear feels weird. Has some radiation in to the neck. Hard to open her mouth at one point. Rates pain as a 6-7/10.   Takes Aleve qPM for her knee/hand OA  and now 2# recently and Tylenol arthritis strength in the AM.  Adding ibuprofen 2#/d as well.   ASA 81mg daily  No sore throat, cough,   Minimal runny nose.   Cr 0.7 with GFR 92 on 9/18/24.  Has a bite guard and wears it regularly. Jaw is tight in the AM.    Denies- fever, chills, shortness of breath, chest pain, cough, runny/stuffy nose, sore throat, loss of taste/smell, headaches, myalgia, nausea, vomiting, diarrhea        Review of Systems - as stated above in the HPI      Objective:     Vitals:    02/05/25 1151   BP: 112/70   Pulse: 66   Resp: 16   Temp: 97.9 °F (36.6 °C)   TempSrc: Temporal   SpO2: 97%   Weight: 220 lb (99.8 kg)   Height: 5' 3.5\" (1.613 m)       Physical Examination   General:  Alert, in no acute distress  HEENT: NCAT, EOMI, normal TMs, oropharynx, and nasal turbinates. + pain with palpation of the L TMJ and crepitus appreciated, neg on the R side for both  Neck:  No cervical lymphadenopathy  CV: Regular rate and rhythm. No murmurs, gallops, or rubs.  Lungs:  Clear to auscultation B, no wheezes, rales, or rhonchi, normal respiratory effort  Abd:  +bowel sounds, soft  Ext:  No pedal edema,  Pedal pulses 2+ B        Assessment:     1. TMJ dysfunction  - Meloxicam 15 MG Oral Tab; Take 1 tablet (15 mg total) by mouth daily as needed for Pain. Take with food (tylenol ok, no  other otc pain meds)  Dispense: 14 tablet; Refill: 0    2. Teeth grinding  -cont to wear bite guard  -if not improving in 2 wks consider m relaxer        Return for if symptoms worsen/don't improve.

## 2025-02-24 DIAGNOSIS — E03.8 OTHER SPECIFIED HYPOTHYROIDISM: ICD-10-CM

## 2025-02-24 RX ORDER — LEVOTHYROXINE SODIUM 137 UG/1
137 TABLET ORAL
Qty: 90 TABLET | Refills: 1 | Status: SHIPPED | OUTPATIENT
Start: 2025-02-24

## 2025-02-24 NOTE — TELEPHONE ENCOUNTER
Requested Prescriptions     Pending Prescriptions Disp Refills    levothyroxine 137 MCG Oral Tab 90 tablet 0     Sig: Take 137 mcg by mouth before breakfast.       LOV: [id number]  RTC: 6m  Last Relevant Labs: [id number]  Filled: [id number] #90 with 0 refills    No future appointments.

## 2025-03-03 ENCOUNTER — PATIENT MESSAGE (OUTPATIENT)
Dept: FAMILY MEDICINE CLINIC | Facility: CLINIC | Age: 70
End: 2025-03-03

## 2025-03-03 DIAGNOSIS — Z12.31 BREAST CANCER SCREENING BY MAMMOGRAM: Primary | ICD-10-CM

## 2025-03-03 DIAGNOSIS — E78.5 HYPERLIPIDEMIA, UNSPECIFIED HYPERLIPIDEMIA TYPE: ICD-10-CM

## 2025-03-04 RX ORDER — VENLAFAXINE HYDROCHLORIDE 37.5 MG/1
37.5 CAPSULE, EXTENDED RELEASE ORAL DAILY
Qty: 90 CAPSULE | Refills: 3 | OUTPATIENT
Start: 2025-03-04

## 2025-03-04 RX ORDER — SIMVASTATIN 20 MG
20 TABLET ORAL DAILY
Qty: 90 TABLET | Refills: 2 | Status: SHIPPED | OUTPATIENT
Start: 2025-03-04

## 2025-03-04 NOTE — TELEPHONE ENCOUNTER
Requested Prescriptions     Pending Prescriptions Disp Refills    simvastatin 20 MG Oral Tab 90 tablet 1     Sig: Take 1 tablet (20 mg total) by mouth daily.     Refused Prescriptions Disp Refills    venlafaxine ER 37.5 MG Oral Capsule SR 24 Hr 90 capsule 3     Sig: Take 1 capsule (37.5 mg total) by mouth daily.     LOV 9/19/24    Return in 6 months (on 3/19/2025)     Filled: 10/15/2024 #90 with 1 refills    Filled: 1/14/25 #90 with 2 refills    Future Appointments   Date Time Provider Department Center   5/2/2025  8:00 AM REF SO [facility] REF EMG17 Ref P[facility] 17

## 2025-03-11 ENCOUNTER — TELEPHONE (OUTPATIENT)
Dept: FAMILY MEDICINE CLINIC | Facility: CLINIC | Age: 70
End: 2025-03-11

## 2025-03-11 RX ORDER — VENLAFAXINE HYDROCHLORIDE 37.5 MG/1
37.5 CAPSULE, EXTENDED RELEASE ORAL DAILY
Qty: 90 CAPSULE | Refills: 1 | Status: SHIPPED | OUTPATIENT
Start: 2025-03-11

## 2025-03-11 NOTE — TELEPHONE ENCOUNTER
Annual on 9/19/24  MDD- she is currently on effexor 75mg daily and  effexor 37.5mg ER      Disp Refills Start End    venlafaxine ER 37.5 MG Oral Capsule SR 24 Hr 90 capsule 3 9/19/2024 --    Sig - Route: Take 1 capsule (37.5 mg total) by mouth daily. - Oral    Sent to pharmacy as: Venlafaxine HCl ER 37.5 MG Oral Capsule Extended Release 24 Hour (Effexor-XR)    E-Prescribing Status: Receipt confirmed by pharmacy (9/19/2024  9:57 AM CDT)      RX resent to [facility] pharmacy

## 2025-03-25 DIAGNOSIS — I10 ESSENTIAL HYPERTENSION: ICD-10-CM

## 2025-03-26 RX ORDER — LISINOPRIL 10 MG/1
10 TABLET ORAL DAILY
Qty: 90 TABLET | Refills: 0 | Status: SHIPPED | OUTPATIENT
Start: 2025-03-26

## 2025-03-26 RX ORDER — HYDROCHLOROTHIAZIDE 12.5 MG/1
12.5 TABLET ORAL DAILY
Qty: 90 TABLET | Refills: 0 | Status: SHIPPED | OUTPATIENT
Start: 2025-03-26

## 2025-03-26 NOTE — TELEPHONE ENCOUNTER
Requesting Lisinopril 10mg  Filled: 9/19/24 #90 with 3 refills    Requesting Hydrochlorothiazide 12.5mg  Filled: 9/19/24 #90 with 3 refills    LOV: 2/5/25  RTC: prn  Last Relevant Labs: 9/18/24    Future Appointments   Date Time Provider Department Center   5/2/2025  8:00 AM REF SO [facility] REF EMG17 Ref PFLD 17   5/22/2025  1:00 PM PFS CHLOÉ RM1 PFS MAMMO [facility] [place]     Hypertension Medications Protocol [id number]/25/2025 06:21 PM   Protocol Details   CMP or BMP in past 12 months    Last BP reading less than 140/90    In person appointment or virtual visit in the past 12 mos or appointment in next 3 mos    EGFRCR or GFRNAA > 50    Medication is active on med list     Rx sent to pharmacy per protocol

## 2025-04-17 ENCOUNTER — TELEPHONE (OUTPATIENT)
Dept: FAMILY MEDICINE CLINIC | Facility: CLINIC | Age: 70
End: 2025-04-17

## 2025-04-17 DIAGNOSIS — E03.9 ACQUIRED HYPOTHYROIDISM: Primary | ICD-10-CM

## 2025-04-17 DIAGNOSIS — Z00.00 WELL ADULT EXAM: ICD-10-CM

## 2025-04-25 ENCOUNTER — LAB ENCOUNTER (OUTPATIENT)
Dept: LAB | Age: 70
End: 2025-04-25
Attending: FAMILY MEDICINE
Payer: MEDICARE

## 2025-04-25 DIAGNOSIS — E03.9 ACQUIRED HYPOTHYROIDISM: ICD-10-CM

## 2025-04-25 DIAGNOSIS — E03.8 OTHER SPECIFIED HYPOTHYROIDISM: ICD-10-CM

## 2025-04-25 DIAGNOSIS — Z00.00 WELL ADULT EXAM: ICD-10-CM

## 2025-04-25 LAB
ALBUMIN SERPL-MCNC: 4.4 G/DL (ref 3.2–4.8)
ALBUMIN/GLOB SERPL: 1.6 {RATIO} (ref 1–2)
ALP LIVER SERPL-CCNC: 67 U/L (ref 55–142)
ALT SERPL-CCNC: 13 U/L (ref 10–49)
ANION GAP SERPL CALC-SCNC: 9 MMOL/L (ref 0–18)
AST SERPL-CCNC: 18 U/L (ref ?–34)
BASOPHILS # BLD AUTO: 0.05 X10(3) UL (ref 0–0.2)
BASOPHILS NFR BLD AUTO: 1.1 %
BILIRUB SERPL-MCNC: 0.3 MG/DL (ref 0.2–1.1)
BUN BLD-MCNC: 27 MG/DL (ref 9–23)
CALCIUM BLD-MCNC: 9.9 MG/DL (ref 8.7–10.6)
CHLORIDE SERPL-SCNC: 105 MMOL/L (ref 98–112)
CHOLEST SERPL-MCNC: 126 MG/DL (ref ?–200)
CO2 SERPL-SCNC: 28 MMOL/L (ref 21–32)
CREAT BLD-MCNC: 0.86 MG/DL (ref 0.55–1.02)
EGFRCR SERPLBLD CKD-EPI 2021: 72 ML/MIN/1.73M2 (ref 60–?)
EOSINOPHIL # BLD AUTO: 0.34 X10(3) UL (ref 0–0.7)
EOSINOPHIL NFR BLD AUTO: 7.4 %
ERYTHROCYTE [DISTWIDTH] IN BLOOD BY AUTOMATED COUNT: 14.3 %
FASTING PATIENT LIPID ANSWER: YES
FASTING STATUS PATIENT QL REPORTED: YES
GLOBULIN PLAS-MCNC: 2.8 G/DL (ref 2–3.5)
GLUCOSE BLD-MCNC: 104 MG/DL (ref 70–99)
HCT VFR BLD AUTO: 43.5 % (ref 35–48)
HDLC SERPL-MCNC: 51 MG/DL (ref 40–59)
HGB BLD-MCNC: 13.7 G/DL (ref 12–16)
IMM GRANULOCYTES # BLD AUTO: 0.01 X10(3) UL (ref 0–1)
IMM GRANULOCYTES NFR BLD: 0.2 %
LDLC SERPL CALC-MCNC: 55 MG/DL (ref ?–100)
LYMPHOCYTES # BLD AUTO: 1.17 X10(3) UL (ref 1–4)
LYMPHOCYTES NFR BLD AUTO: 25.3 %
MCH RBC QN AUTO: 28.5 PG (ref 26–34)
MCHC RBC AUTO-ENTMCNC: 31.5 G/DL (ref 31–37)
MCV RBC AUTO: 90.6 FL (ref 80–100)
MONOCYTES # BLD AUTO: 0.45 X10(3) UL (ref 0.1–1)
MONOCYTES NFR BLD AUTO: 9.7 %
NEUTROPHILS # BLD AUTO: 2.6 X10 (3) UL (ref 1.5–7.7)
NEUTROPHILS # BLD AUTO: 2.6 X10(3) UL (ref 1.5–7.7)
NEUTROPHILS NFR BLD AUTO: 56.3 %
NONHDLC SERPL-MCNC: 75 MG/DL (ref ?–130)
OSMOLALITY SERPL CALC.SUM OF ELEC: 299 MOSM/KG (ref 275–295)
PLATELETS.RETICULATED NFR BLD AUTO: 30.9 % (ref 0–7)
POTASSIUM SERPL-SCNC: 4.1 MMOL/L (ref 3.5–5.1)
PROT SERPL-MCNC: 7.2 G/DL (ref 5.7–8.2)
RBC # BLD AUTO: 4.8 X10(6)UL (ref 3.8–5.3)
SODIUM SERPL-SCNC: 142 MMOL/L (ref 136–145)
TRIGL SERPL-MCNC: 111 MG/DL (ref 30–149)
TSI SER-ACNC: 2.96 UIU/ML (ref 0.55–4.78)
VLDLC SERPL CALC-MCNC: 16 MG/DL (ref 0–30)
WBC # BLD AUTO: 4.6 X10(3) UL (ref 4–11)

## 2025-04-25 PROCEDURE — 80061 LIPID PANEL: CPT

## 2025-04-25 PROCEDURE — 36415 COLL VENOUS BLD VENIPUNCTURE: CPT

## 2025-04-25 PROCEDURE — 80053 COMPREHEN METABOLIC PANEL: CPT

## 2025-04-25 PROCEDURE — 84443 ASSAY THYROID STIM HORMONE: CPT

## 2025-04-25 PROCEDURE — 85025 COMPLETE CBC W/AUTO DIFF WBC: CPT

## 2025-04-30 ENCOUNTER — OFFICE VISIT (OUTPATIENT)
Dept: FAMILY MEDICINE CLINIC | Facility: CLINIC | Age: 70
End: 2025-04-30
Payer: MEDICARE

## 2025-04-30 VITALS
SYSTOLIC BLOOD PRESSURE: 92 MMHG | RESPIRATION RATE: 16 BRPM | TEMPERATURE: 98 F | HEART RATE: 63 BPM | OXYGEN SATURATION: 97 % | WEIGHT: 211 LBS | BODY MASS INDEX: 36.92 KG/M2 | HEIGHT: 63.5 IN | DIASTOLIC BLOOD PRESSURE: 62 MMHG

## 2025-04-30 DIAGNOSIS — M19.90 ARTHRITIS: ICD-10-CM

## 2025-04-30 DIAGNOSIS — M85.852 OSTEOPENIA OF NECK OF LEFT FEMUR: ICD-10-CM

## 2025-04-30 DIAGNOSIS — F33.42 RECURRENT MAJOR DEPRESSIVE DISORDER, IN FULL REMISSION: ICD-10-CM

## 2025-04-30 DIAGNOSIS — E03.9 HYPOTHYROIDISM, UNSPECIFIED TYPE: ICD-10-CM

## 2025-04-30 DIAGNOSIS — E78.5 HYPERLIPIDEMIA, UNSPECIFIED HYPERLIPIDEMIA TYPE: ICD-10-CM

## 2025-04-30 DIAGNOSIS — I10 ESSENTIAL HYPERTENSION: Primary | ICD-10-CM

## 2025-04-30 DIAGNOSIS — R73.03 PREDIABETES: ICD-10-CM

## 2025-04-30 DIAGNOSIS — E03.9 ACQUIRED HYPOTHYROIDISM: ICD-10-CM

## 2025-04-30 DIAGNOSIS — M54.12 CERVICAL RADICULOPATHY: ICD-10-CM

## 2025-04-30 PROCEDURE — 1160F RVW MEDS BY RX/DR IN RCRD: CPT | Performed by: FAMILY MEDICINE

## 2025-04-30 PROCEDURE — 3078F DIAST BP <80 MM HG: CPT | Performed by: FAMILY MEDICINE

## 2025-04-30 PROCEDURE — G2211 COMPLEX E/M VISIT ADD ON: HCPCS | Performed by: FAMILY MEDICINE

## 2025-04-30 PROCEDURE — 3008F BODY MASS INDEX DOCD: CPT | Performed by: FAMILY MEDICINE

## 2025-04-30 PROCEDURE — 99214 OFFICE O/P EST MOD 30 MIN: CPT | Performed by: FAMILY MEDICINE

## 2025-04-30 PROCEDURE — 3074F SYST BP LT 130 MM HG: CPT | Performed by: FAMILY MEDICINE

## 2025-04-30 PROCEDURE — 1159F MED LIST DOCD IN RCRD: CPT | Performed by: FAMILY MEDICINE

## 2025-04-30 RX ORDER — KETOCONAZOLE 20 MG/G
1 CREAM TOPICAL 2 TIMES DAILY
COMMUNITY
Start: 2025-04-22

## 2025-04-30 RX ORDER — HYDROCHLOROTHIAZIDE 12.5 MG/1
12.5 TABLET ORAL DAILY
Qty: 90 TABLET | Refills: 0 | Status: CANCELLED | OUTPATIENT
Start: 2025-04-30

## 2025-04-30 RX ORDER — HYDROCORTISONE 25 MG/G
CREAM TOPICAL
COMMUNITY
Start: 2025-04-22

## 2025-04-30 RX ORDER — LISINOPRIL 10 MG/1
10 TABLET ORAL DAILY
Qty: 90 TABLET | Refills: 0 | Status: SHIPPED | OUTPATIENT
Start: 2025-04-30

## 2025-04-30 RX ORDER — VENLAFAXINE HYDROCHLORIDE 37.5 MG/1
37.5 CAPSULE, EXTENDED RELEASE ORAL DAILY
Qty: 90 CAPSULE | Refills: 1 | Status: SHIPPED | OUTPATIENT
Start: 2025-04-30

## 2025-04-30 RX ORDER — VENLAFAXINE HYDROCHLORIDE 75 MG/1
75 CAPSULE, EXTENDED RELEASE ORAL DAILY
Qty: 90 CAPSULE | Refills: 2 | Status: SHIPPED | OUTPATIENT
Start: 2025-04-30

## 2025-04-30 NOTE — PATIENT INSTRUCTIONS
STOP THE hydrochlorothiazide  MONITOR YOUR BP  DAILY AND NOTIFY ME THE READINGS VIA [email or web address]  SEE ME IN THE OFFICE IN 2 WEEKS.

## 2025-04-30 NOTE — PROGRESS NOTES
Subjective:   Susan Castle is a 70 year old female who presents for Follow - Up     FLP normal, Cmp unremarkable, CBC normal, TSH normal     Colonoscopy done in 2021- repeat due in 2026.     Mammogram scheduled.     She gets cramps in her legs. Drinks a lot of iced tea but now trying to drink more water at night.   Cramp can occur in either leg.     She has lost 34 lbs in a year. Following low carb keto type of diet.     \  Patient Active Problem List:     Hyperlipidemia- continue on statin. FLP normal, CMP normal.      Essential hypertension- continue on hydrochlorothiazide 12.5, and lisinopril 10 (took meds at 8am this morning fasted)     Hypothyroidism-norml tsh, continue levothyroxine at 137     Seasonal allergies- continue on clarinex and flonase.      History of endometrial cancer- stable on letrozole. Hx of  stage 2 adenocarcinoma. S/p robotic assistal total laparoscopic hysterectomy, BSO, omentectomy in 2017. No metastatic disease. On letrozaole since 2020. She had recurrence of vaginal cuff cancerous cells noted- no mets.     Follows with gyn-onc every 6 months- uOc Last appointment 04/17/2025.     Arthritis- tylenol prn - Says her hands have been hurting a lot recently but says she has been out in the garden. Also has tried topical NSAIDS and has found some relief      Osteopenia- taking calcium and vit d, last DEXA scan in 2024 - osteopenia. Worsening lumbar, total hip,stable femoral neck. Has lower back and hip pain but it is tolerable.      Cataract- s/p left cataract removal. Right eye stable. Appointment next month with eye doctor.     Cervical radiculopathy- taking tylenol prn.         Prediabetes- Last A1c value was 5.5% done 9/18/2024.    MDD- she is currently on effexor 75mg daily and effexor 37.5mg ER. No SI or HI.     Mammogram - scheduled. 05/22/2025    Wt Readings from Last 6 Encounters:   04/30/25 211 lb (95.7 kg)   02/05/25 220 lb (99.8 kg)   09/19/24 226 lb (102.5 kg)   03/18/24 245 lb  (111.1 kg)   09/18/23 261 lb (118.4 kg)   03/13/23 269 lb (122 kg)       History/Other:    Chief Complaint Reviewed and Verified  No Further Nursing Notes to   Review  Tobacco Reviewed  Allergies Reviewed  Medications Reviewed    Problem List Reviewed  Medical History Reviewed  Surgical History   Reviewed  Family History Reviewed  Social History Reviewed         Tobacco:  She smoked tobacco in the past but quit greater than 12 months ago.  Tobacco Use[1]     Current Outpatient Medications   Medication Sig Dispense Refill    hydrocortisone 2.5 % External Cream APPLY CREAM TO FACE TWICE DAILY FOR UP TO 1 WEEK, THEN SWITCH TO KETOCONAZOLE CREAM, APPLY CREAM TO ABDOMEN FOR UP TO 2 WEEKS, THEN SWITCH TO KETOCONAZOLE CREAM      ketoconazole 2 % External Cream Apply 1 Application topically 2 (two) times daily. APPLY TO AFFECTED AREA      venlafaxine ER 75 MG Oral Capsule SR 24 Hr Take 1 capsule (75 mg total) by mouth daily. 90 capsule 2    lisinopril 10 MG Oral Tab Take 1 tablet (10 mg total) by mouth daily. 90 tablet 0    venlafaxine ER 37.5 MG Oral Capsule SR 24 Hr Take 1 capsule (37.5 mg total) by mouth daily. 90 capsule 1    simvastatin 20 MG Oral Tab Take 1 tablet (20 mg total) by mouth daily. 90 tablet 2    levothyroxine 137 MCG Oral Tab Take 137 mcg by mouth before breakfast. 90 tablet 1    NON FORMULARY GOLO      letrozole 2.5 MG Oral Tab       Calcium Carbonate Antacid 1000 MG Oral Chew Tab       Acetaminophen  MG Oral Tab CR Take 2 tablets (1,300 mg total) by mouth every other day.      Vitamin B-12 1000 MCG Oral Tab Take 1 tablet (1,000 mcg total) by mouth daily.      Fluticasone Propionate 50 MCG/ACT Nasal Suspension 2 sprays by Nasal route as needed.  2    Cholecalciferol (VITAMIN D3) 01071 units Oral Cap Take 5,000 Units by mouth daily.        aspirin 81 MG Oral Tab Take 1 tablet (81 mg total) by mouth daily.      Desloratadine (CLARINEX OR) Take by mouth.           Review of  Systems:  Pertinent items are noted in HPI.  A comprehensive review of systems was negative.  Respiratory: negative except for cough    Wt Readings from Last 6 Encounters:   04/30/25 211 lb (95.7 kg)   02/05/25 220 lb (99.8 kg)   09/19/24 226 lb (102.5 kg)   03/18/24 245 lb (111.1 kg)   09/18/23 261 lb (118.4 kg)   03/13/23 269 lb (122 kg)         Objective:   BP 92/62   Pulse 63   Temp 98.1 °F (36.7 °C) (Temporal)   Resp 16   Ht 5' 3.5\" (1.613 m)   Wt 211 lb (95.7 kg)   LMP  (LMP Unknown)   SpO2 97%   BMI 36.79 kg/m²  Estimated body mass index is 36.79 kg/m² as calculated from the following:    Height as of this encounter: 5' 3.5\" (1.613 m).    Weight as of this encounter: 211 lb (95.7 kg).  General appearance: alert, cooperative, and no distress  Head: Normocephalic, without obvious abnormality, atraumatic  Throat: lips, mucosa, and tongue normal; teeth and gums normal  Neck: supple, symmetrical, trachea midline and thyroid not enlarged, symmetric  Lungs: clear to auscultation bilaterally  Heart: S1, S2 normal, no murmur, click, rub or gallop, regular rate and rhythm  Abdomen: soft, non-tender; bowel sounds normal; no masses,  no organomegaly  Extremities: extremities normal, atraumatic, no cyanosis or edema  Skin:  Skin texture and turgor normal. Small areas of vitiligo present.      Assessment & Plan:   1. Essential hypertension (Primary)  BP shows good control with last BP of 92/62. Continue lifestyle changes, diet, exercise and weight loss.   4/25/2025: Potassium 4.1; Creatinine 0.86; eGFR-Cr 72  BP Meds: lisinopril Tabs - 10 MG   BP low today, stopped hydrochlorothiazide. Will have her take lisinopril only  Increase hydration, electrolyte intake, may try magnesium for the leg cramp which could be from dehydration.   F/u 2-3 weeks bp check.   Monitor bp daily at home ad bring log  Losing weight with diet/exercise.    -     Lisinopril; Take 1 tablet (10 mg total) by mouth daily.  Dispense: 90 tablet;  Refill: 0  2. Acquired hypothyroidism  Normal tsh- continue same dose of levo   3. Hyperlipidemia, unspecified hyperlipidemia type  Cholesterol shows Good control. Long term heart-healthy diet and lifestyle discussed and encouraged to reduce risk of cardiovascular disease.  4/25/2025: Cholesterol, Total 126; HDL Cholesterol 51; Triglycerides 111; LDL Cholesterol 55  Cholesterol Meds: simvastatin Tabs - 20 MG      4. Prediabetes  - Last A1c value was 5.5% done 9/18/2024.    5. Hypothyroidism, unspecified type  6. Arthritis  - stable     7. Recurrent major depressive disorder, in full remission  -     Venlafaxine HCl ER; Take 1 capsule (75 mg total) by mouth daily.  Dispense: 90 capsule; Refill: 2  -     Venlafaxine HCl ER; Take 1 capsule (37.5 mg total) by mouth daily.  Dispense: 90 capsule; Refill: 1  8. Cervical radiculopathy  Overview:  MRI 4/2017: C5-6 disc bulge, mild stenosis  9. Osteopenia of neck of left femur   Cont vit d and Ca        Return in about 2 weeks (around 5/14/2025) for blood pressure can double book. .        Royer Gant,         This note was prepared using Dragon Medical voice recognition dictation software. As a result errors may occur. When identified these errors have been corrected. While every attempt is made to correct errors during dictation discrepancies may still exist.      Note to patient: The 21st Century Cures Act makes medical notes like these available to patients in the interest of transparency. However, be advised this is a medical document. It is intended as peer to peer communication. It is written in medical language and may contain abbreviations or verbiage that are unfamiliar. It may appear blunt or direct. Medical documents are intended to carry relevant information, facts as evident, and the clinical opinion of the practitioner.               [1]   Social History  Tobacco Use   Smoking Status Former    Current packs/day: 0.00    Types: Cigarettes    Quit date:  2000    Years since quittin.3    Passive exposure: Past   Smokeless Tobacco Never   Tobacco Comments    Smoked very rarely a pack would last more than a month.

## 2025-05-13 ENCOUNTER — OFFICE VISIT (OUTPATIENT)
Dept: FAMILY MEDICINE CLINIC | Facility: CLINIC | Age: 70
End: 2025-05-13
Payer: MEDICARE

## 2025-05-13 VITALS
HEART RATE: 66 BPM | OXYGEN SATURATION: 97 % | DIASTOLIC BLOOD PRESSURE: 70 MMHG | SYSTOLIC BLOOD PRESSURE: 100 MMHG | BODY MASS INDEX: 38.15 KG/M2 | TEMPERATURE: 97 F | HEIGHT: 63.5 IN | WEIGHT: 218 LBS | RESPIRATION RATE: 16 BRPM

## 2025-05-13 DIAGNOSIS — I10 ESSENTIAL HYPERTENSION: Primary | ICD-10-CM

## 2025-05-13 PROCEDURE — 1159F MED LIST DOCD IN RCRD: CPT | Performed by: FAMILY MEDICINE

## 2025-05-13 PROCEDURE — 3078F DIAST BP <80 MM HG: CPT | Performed by: FAMILY MEDICINE

## 2025-05-13 PROCEDURE — 1160F RVW MEDS BY RX/DR IN RCRD: CPT | Performed by: FAMILY MEDICINE

## 2025-05-13 PROCEDURE — 3074F SYST BP LT 130 MM HG: CPT | Performed by: FAMILY MEDICINE

## 2025-05-13 PROCEDURE — G2211 COMPLEX E/M VISIT ADD ON: HCPCS | Performed by: FAMILY MEDICINE

## 2025-05-13 PROCEDURE — 99213 OFFICE O/P EST LOW 20 MIN: CPT | Performed by: FAMILY MEDICINE

## 2025-05-13 PROCEDURE — 3008F BODY MASS INDEX DOCD: CPT | Performed by: FAMILY MEDICINE

## 2025-05-13 RX ORDER — HYDROCHLOROTHIAZIDE 12.5 MG/1
12.5 CAPSULE ORAL DAILY
COMMUNITY

## 2025-05-13 NOTE — PROGRESS NOTES
Subjective:   Susan Castle is a 70 year old female who presents for Blood Pressure (Follow up -- patient brought in log of BP )       History/Other:   History of Present Illness       Susan Castle is a 70 year old female with hypertension who presents for blood pressure management.  Mammogram is scheduled.   HTN- at her last visit two weeks ago  BP was 92/62.  Stopped hydrochlorothiazide. She was told to take lisinopril only. Here for bp check     She has been monitoring her blood pressure at home, noting that her readings are not as low as the office reading of 100/70 mmHg. Some home readings have been elevated, around 151 mmHg, particularly in the morning when she is either drinking coffee or just before. Evening readings are usually taken right before bed.    She was previously on hydrochlorothiazide 12.5 mg, which resulted in low blood pressure readings, with the lowest being during her last visit. Her usual readings are around 110-115 mmHg. Since a medication change, she feels better overall but has noticed weight gain, which she attributes to water retention, as her rings feel tighter. She does not feel particularly swollen but can tell there is some fluid retention.    She has a good supply of both hydrochlorothiazide and lisinopril at home and does not require a prescription refill at this time.   Chief Complaint Reviewed and Verified  Nursing Notes Reviewed and   Verified  Tobacco Reviewed  Allergies Reviewed  Medications Reviewed    Problem List Reviewed  Medical History Reviewed  Surgical History   Reviewed  Family History Reviewed  Social History Reviewed         Tobacco:  She smoked tobacco in the past but quit greater than 12 months ago.  Tobacco Use[1]     Current Medications[2]           Review of Systems:  Pertinent items are noted in HPI.  A comprehensive review of systems was negative.      Objective:   /70   Pulse 66   Temp 97 °F (36.1 °C) (Temporal)   Resp 16   Ht  5' 3.5\" (1.613 m)   Wt 218 lb (98.9 kg)   LMP  (LMP Unknown)   SpO2 97%   BMI 38.01 kg/m²  Estimated body mass index is 38.01 kg/m² as calculated from the following:    Height as of this encounter: 5' 3.5\" (1.613 m).    Weight as of this encounter: 218 lb (98.9 kg).  Results       Physical Exam  VITALS: BP- 114/72  GENERAL: Alert, cooperative, well developed, well nourished, no acute distress.  HEENT: Normocephalic, normal oropharynx, moist mucous membranes.  CHEST: Clear to auscultation bilaterally, no wheezes, rhonchi, or crackles.  CARDIOVASCULAR: Normal heart rate and rhythm, S1 and S2 normal without murmurs.  ABDOMEN: Soft, non-tender, non-distended, without organomegaly, normal bowel sounds.  EXTREMITIES: No cyanosis or edema.  NEUROLOGICAL: Cranial nerves grossly intact, moves all extremities without gross motor or sensory deficit.      Assessment & Plan:   1. Essential hypertension (Primary)    Assessment & Plan  Essential hypertension  Home readings higher than office. Lisinopril may cause fluid retention and weight gain. Switched to hydrochlorothiazide for better control.  - Discontinue lisinopril.  - Start hydrochlorothiazide 12.5 mg.  - Monitor home blood pressure, report via [employer] in two weeks.  - Follow up in six months unless readings are concerning.  - Once stable, reduce monitoring to once daily, two hours post-medication.        No follow-ups on file.      Royer Gant DO, 5/12/2025, 9:27 PM           The following individual(s) verbally consented to be recorded using ambient AI listening technology and understand that they can each withdraw their consent to this listening technology at any point by asking the clinician to turn off or pause the recording:    Patient name: Susan Castle    Royer Gant DO        This note was prepared using Dragon Medical voice recognition dictation software. As a result errors may occur. When identified these errors have been corrected. While  every attempt is made to correct errors during dictation discrepancies may still exist.      Note to patient: The  Century Cures Act makes medical notes like these available to patients in the interest of transparency. However, be advised this is a medical document. It is intended as peer to peer communication. It is written in medical language and may contain abbreviations or verbiage that are unfamiliar. It may appear blunt or direct. Medical documents are intended to carry relevant information, facts as evident, and the clinical opinion of the practitioner.               [1]   Social History  Tobacco Use   Smoking Status Former    Current packs/day: 0.00    Types: Cigarettes    Quit date: 2000    Years since quittin.3    Passive exposure: Past   Smokeless Tobacco Never   Tobacco Comments    Smoked very rarely a pack would last more than a month.   [2]   Current Outpatient Medications   Medication Sig Dispense Refill    hydroCHLOROthiazide 12.5 MG Oral Cap Take 1 capsule (12.5 mg total) by mouth daily.      hydrocortisone 2.5 % External Cream APPLY CREAM TO FACE TWICE DAILY FOR UP TO 1 WEEK, THEN SWITCH TO KETOCONAZOLE CREAM, APPLY CREAM TO ABDOMEN FOR UP TO 2 WEEKS, THEN SWITCH TO KETOCONAZOLE CREAM      ketoconazole 2 % External Cream Apply 1 Application topically 2 (two) times daily. APPLY TO AFFECTED AREA      venlafaxine ER 75 MG Oral Capsule SR 24 Hr Take 1 capsule (75 mg total) by mouth daily. 90 capsule 2    venlafaxine ER 37.5 MG Oral Capsule SR 24 Hr Take 1 capsule (37.5 mg total) by mouth daily. 90 capsule 1    simvastatin 20 MG Oral Tab Take 1 tablet (20 mg total) by mouth daily. 90 tablet 2    levothyroxine 137 MCG Oral Tab Take 137 mcg by mouth before breakfast. 90 tablet 1    NON FORMULARY GOLO      letrozole 2.5 MG Oral Tab       Calcium Carbonate Antacid 1000 MG Oral Chew Tab       Acetaminophen  MG Oral Tab CR Take 2 tablets (1,300 mg total) by mouth every other day.       Vitamin B-12 1000 MCG Oral Tab Take 1 tablet (1,000 mcg total) by mouth daily.      Fluticasone Propionate 50 MCG/ACT Nasal Suspension 2 sprays by Nasal route as needed.  2    Cholecalciferol (VITAMIN D3) 82822 units Oral Cap Take 5,000 Units by mouth daily.        aspirin 81 MG Oral Tab Take 1 tablet (81 mg total) by mouth daily.      Desloratadine (CLARINEX OR) Take by mouth.

## 2025-05-22 ENCOUNTER — HOSPITAL ENCOUNTER (OUTPATIENT)
Dept: MAMMOGRAPHY | Age: 70
Discharge: HOME | End: 2025-05-22
Attending: FAMILY MEDICINE
Payer: MEDICARE

## 2025-05-22 DIAGNOSIS — Z12.31 BREAST CANCER SCREENING BY MAMMOGRAM: ICD-10-CM

## 2025-05-22 PROCEDURE — 77067 SCR MAMMO BI INCL CAD: CPT | Performed by: FAMILY MEDICINE

## 2025-05-22 PROCEDURE — 77063 BREAST TOMOSYNTHESIS BI: CPT | Performed by: FAMILY MEDICINE

## 2025-06-08 DIAGNOSIS — I10 ESSENTIAL HYPERTENSION: ICD-10-CM

## 2025-06-11 RX ORDER — HYDROCHLOROTHIAZIDE 12.5 MG/1
12.5 TABLET ORAL DAILY
Qty: 90 TABLET | Refills: 0 | Status: SHIPPED | OUTPATIENT
Start: 2025-06-11

## 2025-06-11 NOTE — TELEPHONE ENCOUNTER
Requesting Hydrochlorothiazide 12.5mg  LOV: 5/13/25  RTC: not noted  Last Relevant Labs: 4/25/25  Filled: 3/26/25 #90 with 0 refills    Future Appointments   Date Time Provider Department Center   9/22/2025 10:00 AM Royer Gant DO [facility] [address] [facility] [address]     Hypertension Medications Protocol Tqamub1206/08/2025 12:56 PM   Protocol Details   Medication is active on med list    CMP or BMP in past 12 months    Last BP reading less than 140/90    In person appointment or virtual visit in the past 12 mos or appointment in next 3 mos    EGFRCR or GFRNAA > 50     Last visit notes states that patient discontinued Hydrochlorothiazide but was told to restart it at last visit.    Rx sent to pharmacy per protocol

## 2025-07-11 ENCOUNTER — TELEPHONE (OUTPATIENT)
Dept: FAMILY MEDICINE CLINIC | Facility: CLINIC | Age: 70
End: 2025-07-11

## 2025-07-11 ENCOUNTER — OFFICE VISIT (OUTPATIENT)
Dept: FAMILY MEDICINE CLINIC | Facility: CLINIC | Age: 70
End: 2025-07-11
Payer: MEDICARE

## 2025-07-11 VITALS
DIASTOLIC BLOOD PRESSURE: 82 MMHG | HEIGHT: 63.5 IN | HEART RATE: 52 BPM | WEIGHT: 214 LBS | BODY MASS INDEX: 37.45 KG/M2 | OXYGEN SATURATION: 97 % | SYSTOLIC BLOOD PRESSURE: 132 MMHG | RESPIRATION RATE: 16 BRPM | TEMPERATURE: 97 F

## 2025-07-11 DIAGNOSIS — M54.50 ACUTE RIGHT-SIDED LOW BACK PAIN, UNSPECIFIED WHETHER SCIATICA PRESENT: Primary | ICD-10-CM

## 2025-07-11 PROCEDURE — G2211 COMPLEX E/M VISIT ADD ON: HCPCS | Performed by: FAMILY MEDICINE

## 2025-07-11 PROCEDURE — 1159F MED LIST DOCD IN RCRD: CPT | Performed by: FAMILY MEDICINE

## 2025-07-11 PROCEDURE — 99213 OFFICE O/P EST LOW 20 MIN: CPT | Performed by: FAMILY MEDICINE

## 2025-07-11 PROCEDURE — 3079F DIAST BP 80-89 MM HG: CPT | Performed by: FAMILY MEDICINE

## 2025-07-11 PROCEDURE — 3008F BODY MASS INDEX DOCD: CPT | Performed by: FAMILY MEDICINE

## 2025-07-11 PROCEDURE — 3075F SYST BP GE 130 - 139MM HG: CPT | Performed by: FAMILY MEDICINE

## 2025-07-11 RX ORDER — CYCLOBENZAPRINE HCL 10 MG
10 TABLET ORAL NIGHTLY PRN
Qty: 10 TABLET | Refills: 0 | Status: SHIPPED | OUTPATIENT
Start: 2025-07-11

## 2025-07-11 RX ORDER — METHYLPREDNISOLONE 4 MG/1
TABLET ORAL
Qty: 1 EACH | Refills: 0 | Status: SHIPPED | OUTPATIENT
Start: 2025-07-11

## 2025-07-11 NOTE — TELEPHONE ENCOUNTER
Patient requesting an appointment with Dr. Gant for back pain that radiates to her hip and down her leg. Says this happened in May also. Lidocaine patches helped, icy hot does not, took some old norco that also helped.

## 2025-07-11 NOTE — TELEPHONE ENCOUNTER
Spoke to patient.   States she has had lower back pain since this past Tuesday.   Pain travels into right hip and down right leg to above knee.   Has similar episode a month ago that lasted 4-5 days.   States pain is worse this time.   Scheduled appointment for today.   Patient verbalized understanding and thankful for call.  Future Appointments   Date Time Provider Department Center   7/11/2025 12:00 PM Rich Don MD [facility] [address]   9/22/2025 10:00 AM Royer Gant DO [facility] [address]

## 2025-07-11 NOTE — PROGRESS NOTES
Subjective:   Patient ID: Susan Castle is a 70 year old female.    HPI  Ms. Castle is a 70-year-old female with history of hypertension, hyperlipidemia, hypothyroidism, prediabetes, depression, cervical radiculopathy, history of endometrial cancer here today for low back pain which started a week ago.  Pain is mostly located on the right lumbar region radiating down to her right buttock area in the right posterior thigh.  Pain is described as achy and constant, moderate in intensity.  No inciting events that she can think of.  A few weeks ago she has had similar pain which resolved after she had used lidocaine patch.  She took some leftover Norco that she has had in the past with minimal relief.      I had reviewed past medical and family histories together with allergy and medication lists documented.    History/Other:   Review of Systems   Constitutional:  Negative for fever.   Gastrointestinal:  Negative for abdominal pain, diarrhea, nausea and vomiting.   Neurological:  Negative for weakness and numbness.     Current Medications[1]  Allergies:Allergies[2]    Objective:   Physical Exam  Vitals reviewed.   Constitutional:       General: She is not in acute distress.  Musculoskeletal:      Lumbar back: Tenderness present. No swelling, deformity, spasms or bony tenderness.        Back:       Comments: Mild tightness and tenderness to palpation on the right lumbar paraspinal region and right gluteal area.   Neurological:      Mental Status: She is alert.         Assessment & Plan:   1. Acute right-sided low back pain, unspecified whether sciatica present    -Likely musculoskeletal in nature  - Unclear if this is sciatica  - However we will start on Medrol Dosepak and Flexeril at nighttime  - May take Tylenol and/or NSAIDs such as ibuprofen as needed  - Rest at this point  - May continue with lidocaine patch  - If with no improvement we may subject her to imaging of her lumbar spine and/or physical  therapy    This note was prepared using Dragon Medical voice recognition dictation software. As a result errors may occur. When identified these errors have been corrected. While every attempt is made to correct errors during dictation discrepancies may still exist            No orders of the defined types were placed in this encounter.      Meds This Visit:  Requested Prescriptions     Signed Prescriptions Disp Refills    methylPREDNISolone (MEDROL) 4 MG Oral Tablet Therapy Pack 1 each 0     Sig: As directed.    cyclobenzaprine 10 MG Oral Tab 10 tablet 0     Sig: Take 1 tablet (10 mg total) by mouth nightly as needed for Muscle spasms.       Imaging & Referrals:  None         [1]   Current Outpatient Medications   Medication Sig Dispense Refill    methylPREDNISolone (MEDROL) 4 MG Oral Tablet Therapy Pack As directed. 1 each 0    cyclobenzaprine 10 MG Oral Tab Take 1 tablet (10 mg total) by mouth nightly as needed for Muscle spasms. 10 tablet 0    hydroCHLOROthiazide 12.5 MG Oral Tab TAKE 1 TABLET EVERY DAY 90 tablet 0    hydrocortisone 2.5 % External Cream APPLY CREAM TO FACE TWICE DAILY FOR UP TO 1 WEEK, THEN SWITCH TO KETOCONAZOLE CREAM, APPLY CREAM TO ABDOMEN FOR UP TO 2 WEEKS, THEN SWITCH TO KETOCONAZOLE CREAM      ketoconazole 2 % External Cream Apply 1 Application topically 2 (two) times daily. APPLY TO AFFECTED AREA      venlafaxine ER 75 MG Oral Capsule SR 24 Hr Take 1 capsule (75 mg total) by mouth daily. 90 capsule 2    venlafaxine ER 37.5 MG Oral Capsule SR 24 Hr Take 1 capsule (37.5 mg total) by mouth daily. 90 capsule 1    simvastatin 20 MG Oral Tab Take 1 tablet (20 mg total) by mouth daily. 90 tablet 2    levothyroxine 137 MCG Oral Tab Take 137 mcg by mouth before breakfast. 90 tablet 1    NON FORMULARY GOLO      letrozole 2.5 MG Oral Tab       Calcium Carbonate Antacid 1000 MG Oral Chew Tab       Acetaminophen  MG Oral Tab CR Take 2 tablets (1,300 mg total) by mouth every other day.       Vitamin B-12 1000 MCG Oral Tab Take 1 tablet (1,000 mcg total) by mouth daily.      Fluticasone Propionate 50 MCG/ACT Nasal Suspension 2 sprays by Nasal route as needed.  2    Cholecalciferol (VITAMIN D3) 24928 units Oral Cap Take 5,000 Units by mouth daily.        aspirin 81 MG Oral Tab Take 1 tablet (81 mg total) by mouth daily.      Desloratadine (CLARINEX OR) Take by mouth.     [2]   Allergies  Allergen Reactions    Seasonal

## 2025-07-11 NOTE — PATIENT INSTRUCTIONS
Thank you for choosing Rich Don MD at [facility]  To Do: Susan KURT Tin  1. Please see below   Call [phone] to schedule the appointment.   Please signup for [employer], which is electronic access to your record if you have not done so.  All your results will post on there.  [email or web address]   You can NOW use [employer] to book your appointments with us, or consider using open access scheduling which is through the [place] website [email or web address] and type in Rich Don MD and follow the links for \"Schedule Online Now\"    To schedule Imaging or tests at [facility] call Central Scheduling [phone], Go to [facility] ER Building (For example: CT scans, X rays, Ultrasound, MRI)  Cardiac Testing in ER building Building A second floor Cardiac Testing [phone] (For example: Holter Monitor, Cardiac Stress tests,Event Monitor, or 2D Echocardiograms)  [facility] call [phone] usually in [facility]  Walk in Clinic in [place] at [address] Mon-Fri at 8am-7:30 p.m., and Sat/Sun 9:00a.m.-4:30 p.m.  Also at [address] [place] [address]  Call [phone] for info     Please call our office about any questions regarding your treatment/medicines/tests as a result of today's visit.  For your safety, read the entire package insert of all medicines prescribed to you and be aware of all of the risks of treatment even beyond those discussed today.  All therapies have potential risk of harm or side effects or medication interactions.  It is your duty and for your safety to discuss with the pharmacist and our office with questions, and to notify us and stop treatment if problems arise, but know that our intention is that the benefits outweigh those potential risks and we strive to make you healthier and to improve your quality of life.    Referrals, and Radiology Information:    If your insurance requires a referral to a specialist, please allow 5 business days to process  your referral request.    If Rich Don MD orders a CT or MRI, it may take up to 10 business days to receive approval from your insurance company. Once our office has called informing you that the insurance company approved your testing, please call Central Scheduling at [phone]  Please allow our office 5 business days to contact you regarding any testing results.    Refill policies:   Allow 3 business days for refills; controlled substances may take longer and must be picked up from the office in person.  Narcotic medications can only be filled in 30 day increments and must be refilled at an office visit only.  If your prescription is due for a refill, you may be due for a follow-up appointment.  We cannot refill your maintenance medications at a preventative wellness visit.  To best provide you care, patients receiving maintenance medications need to be seen at least twice a year.

## 2025-07-15 ENCOUNTER — PATIENT MESSAGE (OUTPATIENT)
Dept: FAMILY MEDICINE CLINIC | Facility: CLINIC | Age: 70
End: 2025-07-15

## 2025-07-15 DIAGNOSIS — M54.50 ACUTE RIGHT-SIDED LOW BACK PAIN, UNSPECIFIED WHETHER SCIATICA PRESENT: Primary | ICD-10-CM

## 2025-07-16 NOTE — TELEPHONE ENCOUNTER
LOV 7/11/2025 with Dr. Don for leg pain.   1. Acute right-sided low back pain, unspecified whether sciatica present    If with no improvement we may subject her to imaging of her lumbar spine and/or physical therapy     Okay to order xray lumbar spine and PT?

## 2025-07-21 DIAGNOSIS — E03.8 OTHER SPECIFIED HYPOTHYROIDISM: ICD-10-CM

## 2025-07-22 RX ORDER — LEVOTHYROXINE SODIUM 137 UG/1
137 TABLET ORAL
Qty: 90 TABLET | Refills: 1 | Status: SHIPPED | OUTPATIENT
Start: 2025-07-22

## 2025-07-22 NOTE — TELEPHONE ENCOUNTER
Requested Prescriptions     Pending Prescriptions Disp Refills    LEVOTHYROXINE 137 MCG Oral Tab [Pharmacy Med Name: LEVOTHYROXINE SODIUM 137 MCG Oral Tablet] 90 tablet 3     Sig: TAKE 1 TABLET BEFORE BREAKFAST     LOV: 5/13/25  RTC:   Last Relevant Labs: 4/25/25  Filled: 2/24/25 #90 with 1 refills    Future Appointments   Date Time Provider Department Center   9/22/2025 10:00 AM Royer Gant,  [facility] [address]

## 2025-08-12 ENCOUNTER — PATIENT MESSAGE (OUTPATIENT)
Dept: FAMILY MEDICINE CLINIC | Facility: CLINIC | Age: 70
End: 2025-08-12

## 2025-08-23 DIAGNOSIS — I10 ESSENTIAL HYPERTENSION: ICD-10-CM

## 2025-08-26 RX ORDER — HYDROCHLOROTHIAZIDE 12.5 MG/1
12.5 TABLET ORAL DAILY
Qty: 90 TABLET | Refills: 3 | Status: SHIPPED | OUTPATIENT
Start: 2025-08-26

## (undated) DIAGNOSIS — Z02.9 ENCOUNTERS FOR ADMINISTRATIVE PURPOSE: ICD-10-CM

## (undated) DEVICE — Device: Brand: DEFENDO AIR/WATER/SUCTION AND BIOPSY VALVE

## (undated) DEVICE — BANDAGE COBAN 5YDX1 TAN LTX

## (undated) DEVICE — FILTERLINE NASAL ADULT O2/CO2

## (undated) DEVICE — STERILE POLYISOPRENE POWDER-FREE SURGICAL GLOVES: Brand: PROTEXIS

## (undated) DEVICE — SOLUTION  .9 1000ML BTL

## (undated) DEVICE — ENDOSCOPY PACK - LOWER: Brand: MEDLINE INDUSTRIES, INC.

## (undated) DEVICE — 3M™ RED DOT™ MONITORING ELECTRODE WITH FOAM TAPE AND STICKY GEL, 50/BAG, 20/CASE, 72/PLT 2570: Brand: RED DOT™

## (undated) DEVICE — NON-ADHERENT STRIPS,OIL EMULSION: Brand: CURITY

## (undated) DEVICE — UPPER EXTREMITY CDS-LF: Brand: MEDLINE INDUSTRIES, INC.

## (undated) DEVICE — STERILE POLYISOPRENE POWDER-FREE SURGICAL GLOVES WITH EMOLLIENT COATING: Brand: PROTEXIS

## (undated) DEVICE — GAUZE SPONGES,12 PLY: Brand: CURITY

## (undated) DEVICE — SUT CHROMIC GUT 5-0 RB-1 U202H

## (undated) DEVICE — MINI-BLADE®: Brand: BEAVER®

## (undated) DEVICE — 1200CC GUARDIAN II: Brand: GUARDIAN

## (undated) DEVICE — DISPOSABLE BIPOLAR FORCEPS 4" (10.2CM) JEWELERS, STRAIGHT 0.4MM TIP AND 12 FT. (3.6M) CABLE: Brand: KIRWAN

## (undated) DEVICE — ISOPROPYL ALCOHOL 70% 4OZ BTL

## (undated) DEVICE — STANDARD HYPODERMIC NEEDLE,POLYPROPYLENE HUB: Brand: MONOJECT

## (undated) NOTE — IP AVS SNAPSHOT
After Visit Summary   5/10/2017    Zhen Perry    MRN: [id number]           Allergies     Seasonal       Your Vital Signs Were     BP Pulse Temp(Src) Resp Weight SpO2    138/43 mmHg 76 98.4 °F (36.9 °C) (Tympanic) 20 122.199 kg (269 lb 6.4 oz) your Zip Code and Date of Birth to complete the sign-up process. If you do not sign up before the expiration date, you must request a new code.     Your unique [employer] Access Code: [email or web address]  Expires: 7/9/2017  9:12 AM    If you have questions, you can ca

## (undated) NOTE — IP AVS SNAPSHOT
After Visit Summary   4/12/2017    Alberto Zain    MRN: [id number]           Allergies     Seasonal       Your Vital Signs Were     Smoking Status                   Former Smoker                     Patient Instructions    POST-RADIATION INSTRU Now link in the [employer] Status Overload. Enter your [email or web address] Activation Code exactly as it appears below along with your Zip Code and Date of Birth to complete the sign-up process. If you do not sign up before the expiration date, you must request a new code.     Checo Pleitez